# Patient Record
Sex: MALE | URBAN - METROPOLITAN AREA
[De-identification: names, ages, dates, MRNs, and addresses within clinical notes are randomized per-mention and may not be internally consistent; named-entity substitution may affect disease eponyms.]

---

## 2022-09-21 ENCOUNTER — TRANSCRIBE ORDER (OUTPATIENT)
Dept: SCHEDULING | Age: 67
End: 2022-09-21

## 2022-09-21 DIAGNOSIS — M47.819 SPONDYLOSIS WITHOUT MYELOPATHY: Primary | ICD-10-CM

## 2022-09-21 DIAGNOSIS — M51.36 DEGENERATION, INTERVERTEBRAL DISC, LUMBAR: ICD-10-CM

## 2023-05-28 ENCOUNTER — HOSPITAL ENCOUNTER (EMERGENCY)
Facility: HOSPITAL | Age: 68
Discharge: HOME | End: 2023-05-28
Attending: FAMILY MEDICINE
Payer: MEDICAID

## 2023-05-28 ENCOUNTER — APPOINTMENT (OUTPATIENT)
Facility: HOSPITAL | Age: 68
End: 2023-05-28
Payer: MEDICAID

## 2023-05-28 VITALS
DIASTOLIC BLOOD PRESSURE: 79 MMHG | SYSTOLIC BLOOD PRESSURE: 121 MMHG | RESPIRATION RATE: 22 BRPM | BODY MASS INDEX: 33.77 KG/M2 | WEIGHT: 228 LBS | OXYGEN SATURATION: 100 % | TEMPERATURE: 98.6 F | HEIGHT: 69 IN | HEART RATE: 70 BPM

## 2023-05-28 DIAGNOSIS — J45.21 MILD INTERMITTENT ASTHMA WITH EXACERBATION: Primary | ICD-10-CM

## 2023-05-28 PROCEDURE — 99283 EMERGENCY DEPT VISIT LOW MDM: CPT

## 2023-05-28 PROCEDURE — 6370000000 HC RX 637 (ALT 250 FOR IP): Performed by: FAMILY MEDICINE

## 2023-05-28 PROCEDURE — 71046 X-RAY EXAM CHEST 2 VIEWS: CPT

## 2023-05-28 RX ORDER — IPRATROPIUM BROMIDE AND ALBUTEROL SULFATE 2.5; .5 MG/3ML; MG/3ML
1 SOLUTION RESPIRATORY (INHALATION)
Status: COMPLETED | OUTPATIENT
Start: 2023-05-28 | End: 2023-05-28

## 2023-05-28 RX ORDER — ALBUTEROL SULFATE 90 UG/1
2 AEROSOL, METERED RESPIRATORY (INHALATION) EVERY 4 HOURS PRN
Qty: 18 G | Refills: 0 | Status: SHIPPED | OUTPATIENT
Start: 2023-05-28 | End: 2023-06-04

## 2023-05-28 RX ADMIN — IPRATROPIUM BROMIDE AND ALBUTEROL SULFATE 1 DOSE: 2.5; .5 SOLUTION RESPIRATORY (INHALATION) at 08:21

## 2024-12-13 ENCOUNTER — HOSPITAL ENCOUNTER (EMERGENCY)
Facility: HOSPITAL | Age: 69
Discharge: HOME | End: 2024-12-13
Payer: MEDICARE

## 2024-12-13 VITALS
RESPIRATION RATE: 19 BRPM | BODY MASS INDEX: 38.62 KG/M2 | OXYGEN SATURATION: 98 % | SYSTOLIC BLOOD PRESSURE: 171 MMHG | HEART RATE: 65 BPM | WEIGHT: 218 LBS | HEIGHT: 63 IN | DIASTOLIC BLOOD PRESSURE: 83 MMHG | TEMPERATURE: 98.5 F

## 2024-12-13 DIAGNOSIS — I10 HYPERTENSION, UNSPECIFIED TYPE: Primary | ICD-10-CM

## 2024-12-13 PROCEDURE — 99282 EMERGENCY DEPT VISIT SF MDM: CPT

## 2024-12-13 ASSESSMENT — PAIN SCALES - GENERAL: PAINLEVEL_OUTOF10: 0

## 2024-12-13 ASSESSMENT — PAIN - FUNCTIONAL ASSESSMENT: PAIN_FUNCTIONAL_ASSESSMENT: 0-10

## 2024-12-13 NOTE — DISCHARGE INSTRUCTIONS
During yourvisit today, it was found that your home blood pressure monitor was approximately 20 points on systolic and diastolic high compared to our readings in our emergency department today.  This may be causing confusion between your primary care and the monitoring service as they are seeing a much higher blood pressure than what is actually your blood pressure.  My recommendation is that you contact your primary care office and inquire about getting a new blood pressure monitor so that we can remedy the situation.

## 2024-12-13 NOTE — ED PROVIDER NOTES
[facility] EMERGENCY DEPT  EMERGENCY DEPARTMENT HISTORY AND PHYSICAL EXAM      Date: 12/13/2024  Patient Name: Santos Laughlin  MRN: [id number]  YOB: 1955  Date of evaluation: 12/13/2024  Provider: Helio Haider PA-C   Note Started: 1:26 PM EST 12/13/24    HISTORY OF PRESENT ILLNESS     Chief Complaint   Patient presents with    Hypertension       History Provided By: Patient    HPI: Santos Laughlin is a 69 y.o. male past medical history of type II DM, hypertension, anemia, chronic back pain presents to the ED for elevated blood pressure.  Patient states that he has a monitoring service that monitors his vital signs daily.  He has been called several times in the last couple days and told to go to the emergency room because he has had elevated blood pressure.  He states he recently saw his primary care 1 week ago for his blood pressure, and no blood pressure medication adjustments were made at that point.  Patient is a little bit frustrated with this current development.  He states he takes all medications as prescribed, his main complaint about his medications is that he does have some erectile dysfunction.  Denies any chest pain, shortness of breath, SARABIA, nausea, vomiting, abdominal pain.    PAST MEDICAL HISTORY   Past Medical History:  No past medical history on file.    Past Surgical History:  No past surgical history on file.    Family History:  No family history on file.    Social History:       Allergies:  Allergies   Allergen Reactions    Codeine Anxiety    Pcn [Penicillins] Anxiety       PCP: Nitish Dela Cruz DO    Current Meds:   No current facility-administered medications for this encounter.     Current Outpatient Medications   Medication Sig Dispense Refill    albuterol sulfate HFA (PROVENTIL;VENTOLIN;PROAIR) 108 (90 Base) MCG/ACT inhaler Inhale 2 puffs into the lungs every 4 hours as needed for Wheezing 18 g 0       Social Determinants of Health:   Social Determinants of Health     Tobacco Use:  proofreading.)     Helio Haider PA-C  12/13/24 9975

## 2024-12-13 NOTE — ED TRIAGE NOTES
Pt reports elevated BP per company that monitors his vitals. Pt states BP has been elevated for the past year but that insisted that he come. Pt endorses headache intermittently x 2 days

## 2025-03-04 ENCOUNTER — APPOINTMENT (OUTPATIENT)
Facility: HOSPITAL | Age: 70
End: 2025-03-04
Payer: MEDICARE

## 2025-03-04 ENCOUNTER — HOSPITAL ENCOUNTER (EMERGENCY)
Facility: HOSPITAL | Age: 70
Discharge: HOME | End: 2025-03-04
Attending: EMERGENCY MEDICINE
Payer: MEDICARE

## 2025-03-04 VITALS
DIASTOLIC BLOOD PRESSURE: 108 MMHG | BODY MASS INDEX: 30.51 KG/M2 | HEART RATE: 99 BPM | WEIGHT: 206 LBS | RESPIRATION RATE: 16 BRPM | HEIGHT: 69 IN | OXYGEN SATURATION: 100 % | SYSTOLIC BLOOD PRESSURE: 177 MMHG | TEMPERATURE: 98.3 F

## 2025-03-04 DIAGNOSIS — S20.211A CONTUSION OF RIB ON RIGHT SIDE, INITIAL ENCOUNTER: ICD-10-CM

## 2025-03-04 DIAGNOSIS — I10 UNCONTROLLED HYPERTENSION: Primary | ICD-10-CM

## 2025-03-04 LAB
EKG ATRIAL RATE: 106 BPM
EKG DIAGNOSIS: NORMAL
EKG P AXIS: 31 DEGREES
EKG P-R INTERVAL: 172 MS
EKG Q-T INTERVAL: 354 MS
EKG QRS DURATION: 80 MS
EKG QTC CALCULATION (BAZETT): 470 MS
EKG R AXIS: -41 DEGREES
EKG T AXIS: 55 DEGREES
EKG VENTRICULAR RATE: 106 BPM

## 2025-03-04 PROCEDURE — 6370000000 HC RX 637 (ALT 250 FOR IP): Performed by: EMERGENCY MEDICINE

## 2025-03-04 PROCEDURE — 99284 EMERGENCY DEPT VISIT MOD MDM: CPT

## 2025-03-04 PROCEDURE — 93005 ELECTROCARDIOGRAM TRACING: CPT | Performed by: EMERGENCY MEDICINE

## 2025-03-04 PROCEDURE — 71101 X-RAY EXAM UNILAT RIBS/CHEST: CPT

## 2025-03-04 RX ORDER — AMLODIPINE BESYLATE 5 MG/1
5 TABLET ORAL DAILY
COMMUNITY
Start: 2025-02-18 | End: 2025-03-04

## 2025-03-04 RX ORDER — INSULIN LISPRO 100 [IU]/ML
70 INJECTION, SOLUTION INTRAVENOUS; SUBCUTANEOUS
COMMUNITY
Start: 2025-02-21

## 2025-03-04 RX ORDER — NAPROXEN 500 MG/1
500 TABLET ORAL
Status: COMPLETED | OUTPATIENT
Start: 2025-03-04 | End: 2025-03-04

## 2025-03-04 RX ORDER — LIDOCAINE 4 G/G
1 PATCH TOPICAL DAILY
Qty: 20 EACH | Refills: 0 | Status: SHIPPED | OUTPATIENT
Start: 2025-03-04 | End: 2025-03-24

## 2025-03-04 RX ORDER — CLONIDINE HYDROCHLORIDE 0.1 MG/1
0.1 TABLET ORAL
Status: COMPLETED | OUTPATIENT
Start: 2025-03-04 | End: 2025-03-04

## 2025-03-04 RX ORDER — NAPROXEN SODIUM 550 MG/1
550 TABLET ORAL 2 TIMES DAILY WITH MEALS
Qty: 30 TABLET | Refills: 0 | Status: SHIPPED | OUTPATIENT
Start: 2025-03-04

## 2025-03-04 RX ORDER — OLMESARTAN MEDOXOMIL 20 MG/1
20 TABLET ORAL DAILY
COMMUNITY
Start: 2025-02-03

## 2025-03-04 RX ORDER — AMLODIPINE BESYLATE 5 MG/1
10 TABLET ORAL
Status: COMPLETED | OUTPATIENT
Start: 2025-03-04 | End: 2025-03-04

## 2025-03-04 RX ORDER — METHOCARBAMOL 500 MG/1
1000 TABLET, FILM COATED ORAL
Status: COMPLETED | OUTPATIENT
Start: 2025-03-04 | End: 2025-03-04

## 2025-03-04 RX ORDER — LIDOCAINE 4 G/G
1 PATCH TOPICAL
Status: DISCONTINUED | OUTPATIENT
Start: 2025-03-04 | End: 2025-03-04 | Stop reason: HOSPADM

## 2025-03-04 RX ORDER — INSULIN GLARGINE 100 [IU]/ML
38 INJECTION, SOLUTION SUBCUTANEOUS
COMMUNITY
Start: 2025-02-21

## 2025-03-04 RX ORDER — METHOCARBAMOL 750 MG/1
750 TABLET, FILM COATED ORAL 4 TIMES DAILY
Qty: 28 TABLET | Refills: 0 | Status: SHIPPED | OUTPATIENT
Start: 2025-03-04 | End: 2025-03-11

## 2025-03-04 RX ORDER — PREGABALIN 75 MG/1
75 CAPSULE ORAL 2 TIMES DAILY
COMMUNITY

## 2025-03-04 RX ORDER — PANCRELIPASE 24000; 76000; 120000 [USP'U]/1; [USP'U]/1; [USP'U]/1
1 CAPSULE, DELAYED RELEASE PELLETS ORAL 3 TIMES DAILY
COMMUNITY

## 2025-03-04 RX ORDER — CLONIDINE HYDROCHLORIDE 0.1 MG/1
0.2 TABLET ORAL
Status: DISCONTINUED | OUTPATIENT
Start: 2025-03-04 | End: 2025-03-04

## 2025-03-04 RX ORDER — AMLODIPINE BESYLATE 5 MG/1
10 TABLET ORAL DAILY
Qty: 30 TABLET | Refills: 0 | Status: SHIPPED | OUTPATIENT
Start: 2025-03-04

## 2025-03-04 RX ADMIN — METHOCARBAMOL 1000 MG: 500 TABLET ORAL at 15:00

## 2025-03-04 RX ADMIN — CLONIDINE HYDROCHLORIDE 0.1 MG: 0.1 TABLET ORAL at 16:47

## 2025-03-04 RX ADMIN — NAPROXEN 500 MG: 500 TABLET ORAL at 15:00

## 2025-03-04 RX ADMIN — AMLODIPINE BESYLATE 10 MG: 5 TABLET ORAL at 15:00

## 2025-03-04 ASSESSMENT — LIFESTYLE VARIABLES
HOW MANY STANDARD DRINKS CONTAINING ALCOHOL DO YOU HAVE ON A TYPICAL DAY: 1 OR 2
HOW OFTEN DO YOU HAVE A DRINK CONTAINING ALCOHOL: 2-3 TIMES A WEEK

## 2025-03-04 ASSESSMENT — PAIN - FUNCTIONAL ASSESSMENT
PAIN_FUNCTIONAL_ASSESSMENT: 0-10
PAIN_FUNCTIONAL_ASSESSMENT: 0-10

## 2025-03-04 ASSESSMENT — PAIN DESCRIPTION - ORIENTATION: ORIENTATION: RIGHT

## 2025-03-04 ASSESSMENT — PAIN DESCRIPTION - LOCATION: LOCATION: RIB CAGE

## 2025-03-04 ASSESSMENT — PAIN SCALES - GENERAL
PAINLEVEL_OUTOF10: 8
PAINLEVEL_OUTOF10: 4

## 2025-03-04 NOTE — ED PROVIDER NOTES
[facility] EMERGENCY DEPT  EMERGENCY DEPARTMENT HISTORY AND PHYSICAL EXAM      Date: 3/4/2025  Patient Name: Santos Laughlin  MRN: [id number]  Birthdate 1955  Date of evaluation: 3/4/2025  Provider: Wilmer Cerrato MD   Note Started: 2:54 PM EST 3/4/25    HISTORY OF PRESENT ILLNESS     Chief Complaint   Patient presents with    Hypertension    Rib Pain (injury)       History Provided By: Patient    HPI: Santos Laughlin is a 70 y.o. male with a history of diabetes and hypertension presenting for elevated blood pressure as well as right rib pain.  Patient states that on Friday, several days ago he had a ground-level fall into his bathtub and injured his right rib pain.  States that it hurts when he is coughing or sneezing.  Denies any increased shortness of breath or loss of consciousness.  States that it hurts every so often.  Then today he had a nurse monitoring his blood pressure at home and when checked his blood pressure as it was over 200s so sent here.  Denies any headache, chest pain.  He does state that besides pain he also did eat cherry tomatoes with salt and pepper on it yesterday.    PAST MEDICAL HISTORY   Past Medical History:  Past Medical History:   Diagnosis Date    Diabetes mellitus (HCC)     Hypertension        Past Surgical History:  History reviewed. No pertinent surgical history.    Family History:  History reviewed. No pertinent family history.    Social History:  Social History     Tobacco Use    Smoking status: Never    Smokeless tobacco: Never   Substance Use Topics    Alcohol use: Yes    Drug use: Never       Allergies:  Allergies   Allergen Reactions    Codeine Anxiety    Pcn [Penicillins] Anxiety       PCP: Nitish Dela Cruz DO    Current Meds:   Current Facility-Administered Medications   Medication Dose Route Frequency Provider Last Rate Last Admin    lidocaine 4 % external patch 1 patch  1 patch TransDERmal NOW Wilmer Cerrato MD   1 patch at 03/04/25 2051     Current Outpatient Medications

## 2025-03-04 NOTE — ED TRIAGE NOTES
Pt referred to ED for elevated BP; pt states he monitors his BP at home which then gets sent to his MD remotely    Also c/o right rib pain s/p GLF into a bathtub on Friday night

## 2025-03-04 NOTE — DISCHARGE INSTRUCTIONS
available 24 hours a day.     If a prescription has been provided, please fill it as soon as possible to prevent a delay in treatment. If you have any questions or reservations about taking the medication due to side effects or interactions with other medications, please call your primary care provider or contact us directly.  Again, THANK YOU for choosing us to care for YOU!

## 2025-03-28 ENCOUNTER — APPOINTMENT (OUTPATIENT)
Facility: HOSPITAL | Age: 70
DRG: 871 | End: 2025-03-28
Payer: MEDICARE

## 2025-03-28 ENCOUNTER — APPOINTMENT (OUTPATIENT)
Facility: HOSPITAL | Age: 70
DRG: 871 | End: 2025-03-28
Attending: STUDENT IN AN ORGANIZED HEALTH CARE EDUCATION/TRAINING PROGRAM
Payer: MEDICARE

## 2025-03-28 ENCOUNTER — HOSPITAL ENCOUNTER (INPATIENT)
Facility: HOSPITAL | Age: 70
LOS: 7 days | Discharge: NURSING, REHAB OR LONG-TERM CARE | DRG: 871 | End: 2025-04-04
Attending: STUDENT IN AN ORGANIZED HEALTH CARE EDUCATION/TRAINING PROGRAM
Payer: MEDICARE

## 2025-03-28 DIAGNOSIS — K86.1 ACUTE ON CHRONIC PANCREATITIS (HCC): ICD-10-CM

## 2025-03-28 DIAGNOSIS — K85.90 ACUTE ON CHRONIC PANCREATITIS (HCC): ICD-10-CM

## 2025-03-28 DIAGNOSIS — A41.9 SEPSIS, DUE TO UNSPECIFIED ORGANISM, UNSPECIFIED WHETHER ACUTE ORGAN DYSFUNCTION PRESENT (HCC): Primary | ICD-10-CM

## 2025-03-28 DIAGNOSIS — R16.0 LIVER MASS: ICD-10-CM

## 2025-03-28 DIAGNOSIS — R06.02 SHORTNESS OF BREATH: ICD-10-CM

## 2025-03-28 PROBLEM — R65.21 SEPTIC SHOCK (HCC): Status: ACTIVE | Noted: 2025-03-28

## 2025-03-28 PROBLEM — M54.9 CHRONIC BACK PAIN: Status: ACTIVE | Noted: 2025-03-28

## 2025-03-28 PROBLEM — D64.9 ANEMIA: Status: ACTIVE | Noted: 2025-03-28

## 2025-03-28 PROBLEM — Z79.891 CHRONIC PRESCRIPTION OPIATE USE: Status: ACTIVE | Noted: 2025-03-28

## 2025-03-28 PROBLEM — K76.9 LIVER LESION: Status: ACTIVE | Noted: 2025-03-28

## 2025-03-28 PROBLEM — R65.20 SEVERE SEPSIS: Status: ACTIVE | Noted: 2025-03-28

## 2025-03-28 PROBLEM — R18.8 ASCITES: Status: ACTIVE | Noted: 2025-03-28

## 2025-03-28 PROBLEM — G89.29 CHRONIC BACK PAIN: Status: ACTIVE | Noted: 2025-03-28

## 2025-03-28 LAB
ALBUMIN SERPL-MCNC: 1.4 G/DL (ref 3.5–5)
ALBUMIN/GLOB SERPL: 0.3 (ref 1.1–2.2)
ALP SERPL-CCNC: 354 U/L (ref 45–117)
ALT SERPL-CCNC: 10 U/L (ref 12–78)
ANION GAP SERPL CALC-SCNC: 8 MMOL/L (ref 2–12)
APPEARANCE UR: ABNORMAL
AST SERPL W P-5'-P-CCNC: 17 U/L (ref 15–37)
BACTERIA URNS QL MICRO: NEGATIVE /HPF
BASOPHILS # BLD: 0 K/UL (ref 0–0.1)
BASOPHILS NFR BLD: 0 % (ref 0–1)
BILIRUB SERPL-MCNC: 1.1 MG/DL (ref 0.2–1)
BILIRUB UR QL: NEGATIVE
BUN SERPL-MCNC: 33 MG/DL (ref 6–20)
BUN/CREAT SERPL: 17 (ref 12–20)
CA-I BLD-MCNC: 8.2 MG/DL (ref 8.5–10.1)
CHLORIDE SERPL-SCNC: 101 MMOL/L (ref 97–108)
CO2 SERPL-SCNC: 23 MMOL/L (ref 21–32)
COLLECT DATE STL: NORMAL
COLOR UR: ABNORMAL
CREAT SERPL-MCNC: 1.9 MG/DL (ref 0.7–1.3)
DIFFERENTIAL METHOD BLD: ABNORMAL
EKG ATRIAL RATE: 98 BPM
EKG DIAGNOSIS: NORMAL
EKG P AXIS: 37 DEGREES
EKG P-R INTERVAL: 158 MS
EKG Q-T INTERVAL: 374 MS
EKG QRS DURATION: 82 MS
EKG QTC CALCULATION (BAZETT): 477 MS
EKG R AXIS: -29 DEGREES
EKG T AXIS: 77 DEGREES
EKG VENTRICULAR RATE: 98 BPM
EOSINOPHIL # BLD: 0 K/UL (ref 0–0.4)
EOSINOPHIL NFR BLD: 0 % (ref 0–7)
EPITH CASTS URNS QL MICRO: ABNORMAL /LPF
ERYTHROCYTE [DISTWIDTH] IN BLOOD BY AUTOMATED COUNT: 16.6 % (ref 11.5–14.5)
ETHANOL SERPL-MCNC: <10 MG/DL (ref 0–0.08)
GLOBULIN SER CALC-MCNC: 5.2 G/DL (ref 2–4)
GLUCOSE BLD STRIP.AUTO-MCNC: 149 MG/DL (ref 65–100)
GLUCOSE BLD STRIP.AUTO-MCNC: 204 MG/DL (ref 65–100)
GLUCOSE SERPL-MCNC: 143 MG/DL (ref 65–100)
GLUCOSE UR STRIP.AUTO-MCNC: NEGATIVE MG/DL
HCT VFR BLD AUTO: 23 % (ref 36.6–50.3)
HEMOCCULT SP1 STL QL: NEGATIVE
HGB BLD-MCNC: 7.5 G/DL (ref 12.1–17)
HGB UR QL STRIP: NEGATIVE
HYALINE CASTS URNS QL MICRO: >20 /LPF (ref 0–5)
IMM GRANULOCYTES # BLD AUTO: 0 K/UL
IMM GRANULOCYTES NFR BLD AUTO: 0 %
KETONES UR QL STRIP.AUTO: NEGATIVE MG/DL
LACTATE BLD-SCNC: 1.33 MMOL/L (ref 0.4–2)
LACTATE BLD-SCNC: 2.07 MMOL/L (ref 0.4–2)
LEUKOCYTE ESTERASE UR QL STRIP.AUTO: ABNORMAL
LIPASE SERPL-CCNC: 6 U/L (ref 13–75)
LYMPHOCYTES # BLD: 0.26 K/UL (ref 0.8–3.5)
LYMPHOCYTES NFR BLD: 1 % (ref 12–49)
MCH RBC QN AUTO: 29.2 PG (ref 26–34)
MCHC RBC AUTO-ENTMCNC: 32.6 G/DL (ref 30–36.5)
MCV RBC AUTO: 89.5 FL (ref 80–99)
MONOCYTES # BLD: 1.04 K/UL (ref 0–1)
MONOCYTES NFR BLD: 4 % (ref 5–13)
MRSA DNA SPEC QL NAA+PROBE: NOT DETECTED
MUCOUS THREADS URNS QL MICRO: ABNORMAL /LPF
NEUTS BAND NFR BLD MANUAL: 7 % (ref 0–6)
NEUTS SEG # BLD: 24.7 K/UL (ref 1.8–8)
NEUTS SEG NFR BLD: 88 % (ref 32–75)
NITRITE UR QL STRIP.AUTO: NEGATIVE
NRBC # BLD: 0 K/UL (ref 0–0.01)
NRBC BLD-RTO: 0 PER 100 WBC
PERFORMED BY:: ABNORMAL
PERFORMED BY:: NORMAL
PH UR STRIP: 5 (ref 5–8)
PLATELET # BLD AUTO: 319 K/UL (ref 150–400)
PMV BLD AUTO: 10.7 FL (ref 8.9–12.9)
POTASSIUM SERPL-SCNC: 4.4 MMOL/L (ref 3.5–5.1)
PROCALCITONIN SERPL-MCNC: 176.95 NG/ML
PROT SERPL-MCNC: 6.6 G/DL (ref 6.4–8.2)
PROT UR STRIP-MCNC: 30 MG/DL
RBC # BLD AUTO: 2.57 M/UL (ref 4.1–5.7)
RBC #/AREA URNS HPF: ABNORMAL /HPF (ref 0–5)
RBC MORPH BLD: ABNORMAL
RETICS # AUTO: 0.06 M/UL (ref 0.03–0.1)
RETICS/RBC NFR AUTO: 2.5 % (ref 0.7–2.1)
SODIUM SERPL-SCNC: 132 MMOL/L (ref 136–145)
SP GR UR REFRACTOMETRY: 1.03 (ref 1–1.03)
TROPONIN I SERPL HS-MCNC: 16 NG/L (ref 0–76)
URINE CULTURE IF INDICATED: ABNORMAL
UROBILINOGEN UR QL STRIP.AUTO: 0.1 EU/DL (ref 0.1–1)
WBC # BLD AUTO: 26 K/UL (ref 4.1–11.1)
WBC URNS QL MICRO: ABNORMAL /HPF (ref 0–4)

## 2025-03-28 PROCEDURE — 81001 URINALYSIS AUTO W/SCOPE: CPT

## 2025-03-28 PROCEDURE — 80053 COMPREHEN METABOLIC PANEL: CPT

## 2025-03-28 PROCEDURE — 82962 GLUCOSE BLOOD TEST: CPT

## 2025-03-28 PROCEDURE — 6360000002 HC RX W HCPCS: Performed by: STUDENT IN AN ORGANIZED HEALTH CARE EDUCATION/TRAINING PROGRAM

## 2025-03-28 PROCEDURE — 84145 PROCALCITONIN (PCT): CPT

## 2025-03-28 PROCEDURE — P9045 ALBUMIN (HUMAN), 5%, 250 ML: HCPCS | Performed by: NURSE PRACTITIONER

## 2025-03-28 PROCEDURE — 84484 ASSAY OF TROPONIN QUANT: CPT

## 2025-03-28 PROCEDURE — 87086 URINE CULTURE/COLONY COUNT: CPT

## 2025-03-28 PROCEDURE — 2580000003 HC RX 258: Performed by: STUDENT IN AN ORGANIZED HEALTH CARE EDUCATION/TRAINING PROGRAM

## 2025-03-28 PROCEDURE — 83605 ASSAY OF LACTIC ACID: CPT

## 2025-03-28 PROCEDURE — 96374 THER/PROPH/DIAG INJ IV PUSH: CPT

## 2025-03-28 PROCEDURE — 2500000003 HC RX 250 WO HCPCS: Performed by: INTERNAL MEDICINE

## 2025-03-28 PROCEDURE — 6360000002 HC RX W HCPCS: Performed by: NURSE PRACTITIONER

## 2025-03-28 PROCEDURE — 76705 ECHO EXAM OF ABDOMEN: CPT

## 2025-03-28 PROCEDURE — 99285 EMERGENCY DEPT VISIT HI MDM: CPT

## 2025-03-28 PROCEDURE — 51798 US URINE CAPACITY MEASURE: CPT

## 2025-03-28 PROCEDURE — 82077 ASSAY SPEC XCP UR&BREATH IA: CPT

## 2025-03-28 PROCEDURE — 6360000002 HC RX W HCPCS: Performed by: INTERNAL MEDICINE

## 2025-03-28 PROCEDURE — 83540 ASSAY OF IRON: CPT

## 2025-03-28 PROCEDURE — 71045 X-RAY EXAM CHEST 1 VIEW: CPT

## 2025-03-28 PROCEDURE — 2580000003 HC RX 258: Performed by: INTERNAL MEDICINE

## 2025-03-28 PROCEDURE — 83690 ASSAY OF LIPASE: CPT

## 2025-03-28 PROCEDURE — 6360000004 HC RX CONTRAST MEDICATION: Performed by: STUDENT IN AN ORGANIZED HEALTH CARE EDUCATION/TRAINING PROGRAM

## 2025-03-28 PROCEDURE — 99223 1ST HOSP IP/OBS HIGH 75: CPT | Performed by: INTERNAL MEDICINE

## 2025-03-28 PROCEDURE — 87040 BLOOD CULTURE FOR BACTERIA: CPT

## 2025-03-28 PROCEDURE — 87641 MR-STAPH DNA AMP PROBE: CPT

## 2025-03-28 PROCEDURE — 6370000000 HC RX 637 (ALT 250 FOR IP): Performed by: INTERNAL MEDICINE

## 2025-03-28 PROCEDURE — 2000000000 HC ICU R&B

## 2025-03-28 PROCEDURE — 82272 OCCULT BLD FECES 1-3 TESTS: CPT

## 2025-03-28 PROCEDURE — 85025 COMPLETE CBC W/AUTO DIFF WBC: CPT

## 2025-03-28 PROCEDURE — 2500000003 HC RX 250 WO HCPCS

## 2025-03-28 PROCEDURE — 96365 THER/PROPH/DIAG IV INF INIT: CPT

## 2025-03-28 PROCEDURE — 82607 VITAMIN B-12: CPT

## 2025-03-28 PROCEDURE — 82746 ASSAY OF FOLIC ACID SERUM: CPT

## 2025-03-28 PROCEDURE — 2500000003 HC RX 250 WO HCPCS: Performed by: STUDENT IN AN ORGANIZED HEALTH CARE EDUCATION/TRAINING PROGRAM

## 2025-03-28 PROCEDURE — 96361 HYDRATE IV INFUSION ADD-ON: CPT

## 2025-03-28 PROCEDURE — 85045 AUTOMATED RETICULOCYTE COUNT: CPT

## 2025-03-28 PROCEDURE — 74177 CT ABD & PELVIS W/CONTRAST: CPT

## 2025-03-28 PROCEDURE — 93005 ELECTROCARDIOGRAM TRACING: CPT | Performed by: STUDENT IN AN ORGANIZED HEALTH CARE EDUCATION/TRAINING PROGRAM

## 2025-03-28 PROCEDURE — 96375 TX/PRO/DX INJ NEW DRUG ADDON: CPT

## 2025-03-28 RX ORDER — SODIUM CHLORIDE 0.9 % (FLUSH) 0.9 %
5-40 SYRINGE (ML) INJECTION EVERY 12 HOURS SCHEDULED
Status: DISCONTINUED | OUTPATIENT
Start: 2025-03-28 | End: 2025-03-28 | Stop reason: SDUPTHER

## 2025-03-28 RX ORDER — POTASSIUM CHLORIDE 7.45 MG/ML
10 INJECTION INTRAVENOUS PRN
Status: DISCONTINUED | OUTPATIENT
Start: 2025-03-28 | End: 2025-04-04 | Stop reason: HOSPADM

## 2025-03-28 RX ORDER — SODIUM CHLORIDE 9 MG/ML
INJECTION, SOLUTION INTRAVENOUS PRN
Status: DISCONTINUED | OUTPATIENT
Start: 2025-03-28 | End: 2025-04-04 | Stop reason: HOSPADM

## 2025-03-28 RX ORDER — POLYETHYLENE GLYCOL 3350 17 G/17G
17 POWDER, FOR SOLUTION ORAL DAILY PRN
Status: DISCONTINUED | OUTPATIENT
Start: 2025-03-28 | End: 2025-04-04 | Stop reason: HOSPADM

## 2025-03-28 RX ORDER — 0.9 % SODIUM CHLORIDE 0.9 %
1000 INTRAVENOUS SOLUTION INTRAVENOUS ONCE
Status: COMPLETED | OUTPATIENT
Start: 2025-03-28 | End: 2025-03-28

## 2025-03-28 RX ORDER — NOREPINEPHRINE BITARTRATE 0.06 MG/ML
INJECTION, SOLUTION INTRAVENOUS
Status: COMPLETED
Start: 2025-03-28 | End: 2025-03-28

## 2025-03-28 RX ORDER — METRONIDAZOLE 500 MG/100ML
500 INJECTION, SOLUTION INTRAVENOUS ONCE
Status: COMPLETED | OUTPATIENT
Start: 2025-03-28 | End: 2025-03-28

## 2025-03-28 RX ORDER — MAGNESIUM SULFATE IN WATER 40 MG/ML
2000 INJECTION, SOLUTION INTRAVENOUS PRN
Status: DISCONTINUED | OUTPATIENT
Start: 2025-03-28 | End: 2025-04-04 | Stop reason: HOSPADM

## 2025-03-28 RX ORDER — INSULIN LISPRO 100 [IU]/ML
0-4 INJECTION, SOLUTION INTRAVENOUS; SUBCUTANEOUS
Status: DISCONTINUED | OUTPATIENT
Start: 2025-03-28 | End: 2025-03-29

## 2025-03-28 RX ORDER — IOPAMIDOL 755 MG/ML
100 INJECTION, SOLUTION INTRAVASCULAR
Status: COMPLETED | OUTPATIENT
Start: 2025-03-28 | End: 2025-03-28

## 2025-03-28 RX ORDER — ONDANSETRON 4 MG/1
4 TABLET, ORALLY DISINTEGRATING ORAL EVERY 8 HOURS PRN
Status: DISCONTINUED | OUTPATIENT
Start: 2025-03-28 | End: 2025-04-04 | Stop reason: HOSPADM

## 2025-03-28 RX ORDER — HYDROMORPHONE HYDROCHLORIDE 1 MG/ML
0.5 INJECTION, SOLUTION INTRAMUSCULAR; INTRAVENOUS; SUBCUTANEOUS EVERY 4 HOURS PRN
Status: ACTIVE | OUTPATIENT
Start: 2025-03-28 | End: 2025-03-30

## 2025-03-28 RX ORDER — KETOROLAC TROMETHAMINE 15 MG/ML
15 INJECTION, SOLUTION INTRAMUSCULAR; INTRAVENOUS
Status: COMPLETED | OUTPATIENT
Start: 2025-03-28 | End: 2025-03-28

## 2025-03-28 RX ORDER — SODIUM CHLORIDE 0.9 % (FLUSH) 0.9 %
5-40 SYRINGE (ML) INJECTION EVERY 12 HOURS SCHEDULED
Status: DISCONTINUED | OUTPATIENT
Start: 2025-03-28 | End: 2025-04-04 | Stop reason: HOSPADM

## 2025-03-28 RX ORDER — SODIUM CHLORIDE, SODIUM LACTATE, POTASSIUM CHLORIDE, AND CALCIUM CHLORIDE .6; .31; .03; .02 G/100ML; G/100ML; G/100ML; G/100ML
500 INJECTION, SOLUTION INTRAVENOUS ONCE
Status: DISCONTINUED | OUTPATIENT
Start: 2025-03-28 | End: 2025-04-01

## 2025-03-28 RX ORDER — ACETAMINOPHEN 650 MG/1
650 SUPPOSITORY RECTAL EVERY 6 HOURS PRN
Status: DISCONTINUED | OUTPATIENT
Start: 2025-03-28 | End: 2025-03-28 | Stop reason: SDUPTHER

## 2025-03-28 RX ORDER — SODIUM CHLORIDE 9 MG/ML
INJECTION, SOLUTION INTRAVENOUS CONTINUOUS
Status: DISCONTINUED | OUTPATIENT
Start: 2025-03-28 | End: 2025-03-31

## 2025-03-28 RX ORDER — POTASSIUM CHLORIDE 1500 MG/1
40 TABLET, EXTENDED RELEASE ORAL PRN
Status: DISCONTINUED | OUTPATIENT
Start: 2025-03-28 | End: 2025-04-04 | Stop reason: HOSPADM

## 2025-03-28 RX ORDER — ACETAMINOPHEN 325 MG/1
650 TABLET ORAL EVERY 6 HOURS PRN
Status: DISCONTINUED | OUTPATIENT
Start: 2025-03-28 | End: 2025-04-04 | Stop reason: HOSPADM

## 2025-03-28 RX ORDER — MORPHINE SULFATE 2 MG/ML
2 INJECTION, SOLUTION INTRAMUSCULAR; INTRAVENOUS EVERY 4 HOURS PRN
Refills: 0 | Status: DISCONTINUED | OUTPATIENT
Start: 2025-03-28 | End: 2025-04-01

## 2025-03-28 RX ORDER — SODIUM CHLORIDE, SODIUM LACTATE, POTASSIUM CHLORIDE, CALCIUM CHLORIDE 600; 310; 30; 20 MG/100ML; MG/100ML; MG/100ML; MG/100ML
INJECTION, SOLUTION INTRAVENOUS CONTINUOUS
Status: DISCONTINUED | OUTPATIENT
Start: 2025-03-28 | End: 2025-03-28 | Stop reason: ALTCHOICE

## 2025-03-28 RX ORDER — SODIUM CHLORIDE 0.9 % (FLUSH) 0.9 %
5-40 SYRINGE (ML) INJECTION PRN
Status: DISCONTINUED | OUTPATIENT
Start: 2025-03-28 | End: 2025-03-28 | Stop reason: SDUPTHER

## 2025-03-28 RX ORDER — ALBUMIN HUMAN 50 G/1000ML
25 SOLUTION INTRAVENOUS ONCE
Status: COMPLETED | OUTPATIENT
Start: 2025-03-28 | End: 2025-03-28

## 2025-03-28 RX ORDER — ACETAMINOPHEN 325 MG/1
650 TABLET ORAL EVERY 6 HOURS PRN
Status: DISCONTINUED | OUTPATIENT
Start: 2025-03-28 | End: 2025-03-28 | Stop reason: SDUPTHER

## 2025-03-28 RX ORDER — SODIUM CHLORIDE 0.9 % (FLUSH) 0.9 %
5-40 SYRINGE (ML) INJECTION PRN
Status: DISCONTINUED | OUTPATIENT
Start: 2025-03-28 | End: 2025-04-04 | Stop reason: HOSPADM

## 2025-03-28 RX ORDER — ACETAMINOPHEN 650 MG/1
650 SUPPOSITORY RECTAL EVERY 6 HOURS PRN
Status: DISCONTINUED | OUTPATIENT
Start: 2025-03-28 | End: 2025-04-04 | Stop reason: HOSPADM

## 2025-03-28 RX ORDER — SENNOSIDES A AND B 8.6 MG/1
1 TABLET, FILM COATED ORAL DAILY PRN
Status: DISCONTINUED | OUTPATIENT
Start: 2025-03-28 | End: 2025-04-04 | Stop reason: HOSPADM

## 2025-03-28 RX ORDER — MORPHINE SULFATE 4 MG/ML
4 INJECTION, SOLUTION INTRAMUSCULAR; INTRAVENOUS
Status: COMPLETED | OUTPATIENT
Start: 2025-03-28 | End: 2025-03-28

## 2025-03-28 RX ORDER — ENOXAPARIN SODIUM 100 MG/ML
30 INJECTION SUBCUTANEOUS 2 TIMES DAILY
Status: DISCONTINUED | OUTPATIENT
Start: 2025-03-28 | End: 2025-04-04 | Stop reason: HOSPADM

## 2025-03-28 RX ORDER — SODIUM CHLORIDE, SODIUM LACTATE, POTASSIUM CHLORIDE, AND CALCIUM CHLORIDE .6; .31; .03; .02 G/100ML; G/100ML; G/100ML; G/100ML
250 INJECTION, SOLUTION INTRAVENOUS ONCE
Status: DISCONTINUED | OUTPATIENT
Start: 2025-03-28 | End: 2025-04-01

## 2025-03-28 RX ORDER — KETOROLAC TROMETHAMINE 30 MG/ML
30 INJECTION, SOLUTION INTRAMUSCULAR; INTRAVENOUS
Status: DISCONTINUED | OUTPATIENT
Start: 2025-03-28 | End: 2025-03-28

## 2025-03-28 RX ORDER — ONDANSETRON 2 MG/ML
4 INJECTION INTRAMUSCULAR; INTRAVENOUS EVERY 6 HOURS PRN
Status: DISCONTINUED | OUTPATIENT
Start: 2025-03-28 | End: 2025-04-04 | Stop reason: HOSPADM

## 2025-03-28 RX ORDER — HYDROMORPHONE HYDROCHLORIDE 1 MG/ML
1 INJECTION, SOLUTION INTRAMUSCULAR; INTRAVENOUS; SUBCUTANEOUS EVERY 4 HOURS PRN
Status: DISPENSED | OUTPATIENT
Start: 2025-03-28 | End: 2025-03-30

## 2025-03-28 RX ORDER — NOREPINEPHRINE BITARTRATE 0.06 MG/ML
1-100 INJECTION, SOLUTION INTRAVENOUS CONTINUOUS
Status: DISCONTINUED | OUTPATIENT
Start: 2025-03-28 | End: 2025-03-29

## 2025-03-28 RX ADMIN — MORPHINE SULFATE 2 MG: 2 INJECTION, SOLUTION INTRAMUSCULAR; INTRAVENOUS at 22:19

## 2025-03-28 RX ADMIN — SODIUM CHLORIDE: 0.9 INJECTION, SOLUTION INTRAVENOUS at 21:08

## 2025-03-28 RX ADMIN — SODIUM CHLORIDE, POTASSIUM CHLORIDE, SODIUM LACTATE AND CALCIUM CHLORIDE: 600; 310; 30; 20 INJECTION, SOLUTION INTRAVENOUS at 16:18

## 2025-03-28 RX ADMIN — PANCRELIPASE LIPASE, PANCRELIPASE PROTEASE, PANCRELIPASE AMYLASE 5000 UNITS: 5000; 17000; 24000 CAPSULE, DELAYED RELEASE ORAL at 20:05

## 2025-03-28 RX ADMIN — HYDROMORPHONE HYDROCHLORIDE 1 MG: 1 INJECTION, SOLUTION INTRAMUSCULAR; INTRAVENOUS; SUBCUTANEOUS at 16:18

## 2025-03-28 RX ADMIN — ACETAMINOPHEN 650 MG: 325 TABLET ORAL at 19:00

## 2025-03-28 RX ADMIN — MEROPENEM 1000 MG: 1 INJECTION INTRAVENOUS at 20:03

## 2025-03-28 RX ADMIN — NOREPINEPHRINE BITARTRATE 5 MCG/MIN: 0.06 INJECTION, SOLUTION INTRAVENOUS at 18:55

## 2025-03-28 RX ADMIN — SODIUM CHLORIDE 1000 ML: 0.9 INJECTION, SOLUTION INTRAVENOUS at 13:14

## 2025-03-28 RX ADMIN — IOPAMIDOL 100 ML: 755 INJECTION, SOLUTION INTRAVENOUS at 14:02

## 2025-03-28 RX ADMIN — Medication 5 MCG/MIN: at 18:55

## 2025-03-28 RX ADMIN — CEFEPIME 2000 MG: 2 INJECTION, POWDER, FOR SOLUTION INTRAVENOUS at 13:11

## 2025-03-28 RX ADMIN — ENOXAPARIN SODIUM 30 MG: 100 INJECTION SUBCUTANEOUS at 21:24

## 2025-03-28 RX ADMIN — ALBUMIN (HUMAN) 25 G: 12.5 INJECTION, SOLUTION INTRAVENOUS at 21:57

## 2025-03-28 RX ADMIN — HYDROMORPHONE HYDROCHLORIDE 1 MG: 1 INJECTION, SOLUTION INTRAMUSCULAR; INTRAVENOUS; SUBCUTANEOUS at 20:03

## 2025-03-28 RX ADMIN — SODIUM CHLORIDE, PRESERVATIVE FREE 10 ML: 5 INJECTION INTRAVENOUS at 20:09

## 2025-03-28 RX ADMIN — SODIUM CHLORIDE, PRESERVATIVE FREE 10 ML: 5 INJECTION INTRAVENOUS at 20:10

## 2025-03-28 RX ADMIN — INSULIN LISPRO 1 UNITS: 100 INJECTION, SOLUTION INTRAVENOUS; SUBCUTANEOUS at 21:24

## 2025-03-28 RX ADMIN — SODIUM CHLORIDE, POTASSIUM CHLORIDE, SODIUM LACTATE AND CALCIUM CHLORIDE: 600; 310; 30; 20 INJECTION, SOLUTION INTRAVENOUS at 18:22

## 2025-03-28 RX ADMIN — METRONIDAZOLE 500 MG: 500 INJECTION, SOLUTION INTRAVENOUS at 14:46

## 2025-03-28 RX ADMIN — PANCRELIPASE LIPASE, PANCRELIPASE PROTEASE, PANCRELIPASE AMYLASE 20000 UNITS: 20000; 63000; 84000 CAPSULE, DELAYED RELEASE ORAL at 20:05

## 2025-03-28 RX ADMIN — MORPHINE SULFATE 4 MG: 4 INJECTION, SOLUTION INTRAMUSCULAR; INTRAVENOUS at 13:29

## 2025-03-28 RX ADMIN — SODIUM CHLORIDE, SODIUM LACTATE, POTASSIUM CHLORIDE, AND CALCIUM CHLORIDE 500 ML: .6; .31; .03; .02 INJECTION, SOLUTION INTRAVENOUS at 17:35

## 2025-03-28 RX ADMIN — KETOROLAC TROMETHAMINE 15 MG: 15 INJECTION, SOLUTION INTRAMUSCULAR; INTRAVENOUS at 12:23

## 2025-03-28 RX ADMIN — SODIUM CHLORIDE 1000 ML: 0.9 INJECTION, SOLUTION INTRAVENOUS at 12:23

## 2025-03-28 RX ADMIN — SODIUM CHLORIDE, SODIUM LACTATE, POTASSIUM CHLORIDE, AND CALCIUM CHLORIDE 250 ML: .6; .31; .03; .02 INJECTION, SOLUTION INTRAVENOUS at 16:55

## 2025-03-28 ASSESSMENT — PAIN DESCRIPTION - LOCATION
LOCATION: ABDOMEN

## 2025-03-28 ASSESSMENT — PAIN DESCRIPTION - ORIENTATION: ORIENTATION: RIGHT;UPPER

## 2025-03-28 ASSESSMENT — PAIN SCALES - GENERAL
PAINLEVEL_OUTOF10: 4
PAINLEVEL_OUTOF10: 6
PAINLEVEL_OUTOF10: 10
PAINLEVEL_OUTOF10: 2
PAINLEVEL_OUTOF10: 8
PAINLEVEL_OUTOF10: 3
PAINLEVEL_OUTOF10: 7
PAINLEVEL_OUTOF10: 10
PAINLEVEL_OUTOF10: 7
PAINLEVEL_OUTOF10: 8
PAINLEVEL_OUTOF10: 4
PAINLEVEL_OUTOF10: 9

## 2025-03-28 ASSESSMENT — PAIN DESCRIPTION - DESCRIPTORS: DESCRIPTORS: ACHING

## 2025-03-28 ASSESSMENT — LIFESTYLE VARIABLES
HOW OFTEN DO YOU HAVE A DRINK CONTAINING ALCOHOL: NEVER
HOW MANY STANDARD DRINKS CONTAINING ALCOHOL DO YOU HAVE ON A TYPICAL DAY: PATIENT DOES NOT DRINK

## 2025-03-28 NOTE — ED NOTES
Pt requesting to eat, no order entered. MD contacted, MD states that patient can eat.   (0) independent

## 2025-03-28 NOTE — ED TRIAGE NOTES
Abdominal pain, hx of pancreatitis, pain has been going on for a couple of days. Increased nausea.    Was complaining of dizziness for EMS, relieved after receiving 500ML NS bolus.     Currently alert and oriented.

## 2025-03-28 NOTE — ED NOTES
Pt BP low, MAP less than 65. MD notified. Verbal order received to administer 250 mL bolus of LR at this time. Bolus from bag started at this time  1715: Bolus from bag completed at this time.

## 2025-03-28 NOTE — ED PROVIDER NOTES
[facility] EMERGENCY DEPT  EMERGENCY DEPARTMENT HISTORY AND PHYSICAL EXAM      Date of evaluation: 3/28/2025  Patient Name: Santos Laughlin  Birthdate 1955  MRN: [id number]  ED Provider: Rick Hernandez MD   Note Started: 1:29 PM EDT 3/28/25    HISTORY OF PRESENT ILLNESS     Chief Complaint   Patient presents with    Abdominal Pain       History Provided By: Patient,     family member    HPI: Santos Laughlin is a 70 y.o. male with past medical history as reviewed below presents for evaluation of abdominal pain.  States that pain has been present for the last week.  Endorses nausea.  No diarrhea but he states that he has been sticking to mostly liquid meals so stools have been looser than usual.  No recent falls or trauma.  No fevers at home.    PAST MEDICAL HISTORY   Past Medical History:  Past Medical History:   Diagnosis Date    Chronic back pain     Chronic pancreatitis (HCC)     Chronic prescription opiate use 03/28/2025    COPD (chronic obstructive pulmonary disease) (HCC)     Diabetes mellitus (HCC)     Ex-smoker     History of alcohol abuse     Not currently, many years ago    Hypertension     Presence of pancreatic duct stent        Past Surgical History:  History reviewed. No pertinent surgical history.    Family History:  History reviewed. No pertinent family history.    Social History:  Social History     Tobacco Use    Smoking status: Never    Smokeless tobacco: Never   Substance Use Topics    Alcohol use: Yes    Drug use: Never       Allergies:  Allergies   Allergen Reactions    Codeine Anxiety    Pcn [Penicillins] Anxiety       PCP: Nitish Dela Cruz DO    Current Meds:   Current Facility-Administered Medications   Medication Dose Route Frequency Provider Last Rate Last Admin    HYDROmorphone HCl PF (DILAUDID) injection 0.5 mg  0.5 mg IntraVENous Q4H PRN Jose Enrique Tejada MD        Or    HYDROmorphone HCl PF (DILAUDID) injection 1 mg  1 mg IntraVENous Q4H PRN Jose Enrique Tejada MD        insulin

## 2025-03-28 NOTE — CONSULTS
Consult Note            Date:3/28/2025        Patient Name:Santos Laughlin     YOB: 1955     Age:70 y.o.    Consults Infectious Disease  Chief Complaint     Chief Complaint   Patient presents with    Abdominal Pain      Sepsis of unknown source    History Obtained From   patient    History of Present Illness   This is a 70 year old male with history of diabetes, chronic pancreatitis, who presented to the ED because of abdominal pain.  He was afebrile. On exam described as having abdominal tenderness in RUQ, epigastrium and LUQ. WBC 26,000 with elevated procal. Urinalysis with moderate pyuria but no bacteria.   CXR was unremarkable.  CT Abdomen showed diffuse calcifications throughout the pancreas compatible with chronic pancreatitis with ascites and rim-enhancing collection posterior to the liver with multiple complex lesions within the left hepatic lobe, compatible with cyst or mass. Also distended gallbladder and dilated common duct. US showed hepatomegaly with intra and extrahepatic biliary dilatation, a hypoechoic left hepatic capsular mass and cystic right hepatic  mass; also mild gall bladder wall thickening. Blood and urine cultures collected.  Patient has been given dose of Cefepime and Flagyl.  ID has been consulted for this reason. GI also has been consulted.     Patient seen in the ED where he is resting comfortably.  States that he has had largely RUQ and epigastric pain for about 2 weeks along with intermittent nausea and vomiting but no diarrhea.  States that he had stents placed in his pancreas 6 ?weeks ago in [place], [place].  He affirms occasional dysuria with \"stinging\", no flank pain. No respiratory symptoms.   No travel history.     Past Medical History     Past Medical History:   Diagnosis Date    Chronic back pain     Chronic pancreatitis (HCC)     Chronic prescription opiate use 03/28/2025    COPD (chronic obstructive pulmonary disease) (HCC)     Diabetes mellitus

## 2025-03-28 NOTE — ED NOTES
MD notified of persistent low BP after 250 bolus. Verbal order to complete 500 mL LR bolus at this time, bolus from bag started at this time.

## 2025-03-29 ENCOUNTER — APPOINTMENT (OUTPATIENT)
Facility: HOSPITAL | Age: 70
DRG: 871 | End: 2025-03-29
Payer: MEDICARE

## 2025-03-29 ENCOUNTER — APPOINTMENT (OUTPATIENT)
Facility: HOSPITAL | Age: 70
DRG: 871 | End: 2025-03-29
Attending: STUDENT IN AN ORGANIZED HEALTH CARE EDUCATION/TRAINING PROGRAM
Payer: MEDICARE

## 2025-03-29 LAB
ALBUMIN SERPL-MCNC: 1.7 G/DL (ref 3.5–5)
ALBUMIN/GLOB SERPL: 0.4 (ref 1.1–2.2)
ALP SERPL-CCNC: 311 U/L (ref 45–117)
ALT SERPL-CCNC: 11 U/L (ref 12–78)
ANION GAP SERPL CALC-SCNC: 7 MMOL/L (ref 2–12)
AST SERPL W P-5'-P-CCNC: 16 U/L (ref 15–37)
BASOPHILS # BLD: 0 K/UL (ref 0–0.1)
BASOPHILS NFR BLD: 0 % (ref 0–1)
BILIRUB DIRECT SERPL-MCNC: 0.6 MG/DL (ref 0–0.2)
BILIRUB SERPL-MCNC: 1 MG/DL (ref 0.2–1)
BUN SERPL-MCNC: 35 MG/DL (ref 6–20)
BUN/CREAT SERPL: 16 (ref 12–20)
CA-I BLD-MCNC: 7.4 MG/DL (ref 8.5–10.1)
CHLORIDE SERPL-SCNC: 104 MMOL/L (ref 97–108)
CO2 SERPL-SCNC: 21 MMOL/L (ref 21–32)
CREAT SERPL-MCNC: 2.19 MG/DL (ref 0.7–1.3)
CRP SERPL-MCNC: 31.1 MG/DL (ref 0–0.3)
DIFFERENTIAL METHOD BLD: ABNORMAL
EOSINOPHIL # BLD: 0 K/UL (ref 0–0.4)
EOSINOPHIL NFR BLD: 0 % (ref 0–7)
ERYTHROCYTE [DISTWIDTH] IN BLOOD BY AUTOMATED COUNT: 17.1 % (ref 11.5–14.5)
FOLATE SERPL-MCNC: 11.7 NG/ML (ref 5–21)
GGT SERPL-CCNC: 249 U/L (ref 15–85)
GLOBULIN SER CALC-MCNC: 4.6 G/DL (ref 2–4)
GLUCOSE BLD STRIP.AUTO-MCNC: 139 MG/DL (ref 65–100)
GLUCOSE BLD STRIP.AUTO-MCNC: 151 MG/DL (ref 65–100)
GLUCOSE BLD STRIP.AUTO-MCNC: 155 MG/DL (ref 65–100)
GLUCOSE BLD STRIP.AUTO-MCNC: 179 MG/DL (ref 65–100)
GLUCOSE SERPL-MCNC: 156 MG/DL (ref 65–100)
HCT VFR BLD AUTO: 20.7 % (ref 36.6–50.3)
HCT VFR BLD AUTO: 23.2 % (ref 36.6–50.3)
HGB BLD-MCNC: 6.6 G/DL (ref 12.1–17)
HGB BLD-MCNC: 7.5 G/DL (ref 12.1–17)
IMM GRANULOCYTES # BLD AUTO: 0 K/UL
IMM GRANULOCYTES NFR BLD AUTO: 0 %
IRON SATN MFR SERPL: 20 % (ref 20–50)
IRON SERPL-MCNC: 15 UG/DL (ref 35–150)
LYMPHOCYTES # BLD: 0.94 K/UL (ref 0.8–3.5)
LYMPHOCYTES NFR BLD: 4 % (ref 12–49)
MCH RBC QN AUTO: 28.9 PG (ref 26–34)
MCHC RBC AUTO-ENTMCNC: 31.9 G/DL (ref 30–36.5)
MCV RBC AUTO: 90.8 FL (ref 80–99)
MONOCYTES # BLD: 0.7 K/UL (ref 0–1)
MONOCYTES NFR BLD: 3 % (ref 5–13)
NEUTS BAND NFR BLD MANUAL: 7 % (ref 0–6)
NEUTS SEG # BLD: 21.76 K/UL (ref 1.8–8)
NEUTS SEG NFR BLD: 86 % (ref 32–75)
NRBC # BLD: 0 K/UL (ref 0–0.01)
NRBC BLD-RTO: 0 PER 100 WBC
PERFORMED BY:: ABNORMAL
PLATELET # BLD AUTO: 322 K/UL (ref 150–400)
PMV BLD AUTO: 11.1 FL (ref 8.9–12.9)
POTASSIUM SERPL-SCNC: 4.8 MMOL/L (ref 3.5–5.1)
PROCALCITONIN SERPL-MCNC: 147.61 NG/ML
PROT SERPL-MCNC: 6.3 G/DL (ref 6.4–8.2)
RBC # BLD AUTO: 2.28 M/UL (ref 4.1–5.7)
RBC MORPH BLD: ABNORMAL
SODIUM SERPL-SCNC: 132 MMOL/L (ref 136–145)
TIBC SERPL-MCNC: 75 UG/DL (ref 250–450)
VIT B12 SERPL-MCNC: >2000 PG/ML (ref 193–986)
WBC # BLD AUTO: 23.4 K/UL (ref 4.1–11.1)

## 2025-03-29 PROCEDURE — 86900 BLOOD TYPING SEROLOGIC ABO: CPT

## 2025-03-29 PROCEDURE — 30233N1 TRANSFUSION OF NONAUTOLOGOUS RED BLOOD CELLS INTO PERIPHERAL VEIN, PERCUTANEOUS APPROACH: ICD-10-PCS

## 2025-03-29 PROCEDURE — 80048 BASIC METABOLIC PNL TOTAL CA: CPT

## 2025-03-29 PROCEDURE — 86850 RBC ANTIBODY SCREEN: CPT

## 2025-03-29 PROCEDURE — 80076 HEPATIC FUNCTION PANEL: CPT

## 2025-03-29 PROCEDURE — 82977 ASSAY OF GGT: CPT

## 2025-03-29 PROCEDURE — 86140 C-REACTIVE PROTEIN: CPT

## 2025-03-29 PROCEDURE — 2500000003 HC RX 250 WO HCPCS: Performed by: INTERNAL MEDICINE

## 2025-03-29 PROCEDURE — 84145 PROCALCITONIN (PCT): CPT

## 2025-03-29 PROCEDURE — 85014 HEMATOCRIT: CPT

## 2025-03-29 PROCEDURE — 6360000002 HC RX W HCPCS: Performed by: INTERNAL MEDICINE

## 2025-03-29 PROCEDURE — 6370000000 HC RX 637 (ALT 250 FOR IP): Performed by: INTERNAL MEDICINE

## 2025-03-29 PROCEDURE — 82962 GLUCOSE BLOOD TEST: CPT

## 2025-03-29 PROCEDURE — 85018 HEMOGLOBIN: CPT

## 2025-03-29 PROCEDURE — 85025 COMPLETE CBC W/AUTO DIFF WBC: CPT

## 2025-03-29 PROCEDURE — 51798 US URINE CAPACITY MEASURE: CPT

## 2025-03-29 PROCEDURE — 2000000000 HC ICU R&B

## 2025-03-29 PROCEDURE — 36430 TRANSFUSION BLD/BLD COMPNT: CPT

## 2025-03-29 PROCEDURE — 2580000003 HC RX 258: Performed by: INTERNAL MEDICINE

## 2025-03-29 PROCEDURE — 99233 SBSQ HOSP IP/OBS HIGH 50: CPT | Performed by: INTERNAL MEDICINE

## 2025-03-29 PROCEDURE — 6360000004 HC RX CONTRAST MEDICATION: Performed by: INTERNAL MEDICINE

## 2025-03-29 PROCEDURE — P9016 RBC LEUKOCYTES REDUCED: HCPCS

## 2025-03-29 PROCEDURE — 2500000003 HC RX 250 WO HCPCS: Performed by: NURSE PRACTITIONER

## 2025-03-29 PROCEDURE — 86301 IMMUNOASSAY TUMOR CA 19-9: CPT

## 2025-03-29 PROCEDURE — 6360000002 HC RX W HCPCS: Performed by: NURSE PRACTITIONER

## 2025-03-29 PROCEDURE — 74178 CT ABD&PLV WO CNTR FLWD CNTR: CPT

## 2025-03-29 PROCEDURE — 94761 N-INVAS EAR/PLS OXIMETRY MLT: CPT

## 2025-03-29 PROCEDURE — 86901 BLOOD TYPING SEROLOGIC RH(D): CPT

## 2025-03-29 PROCEDURE — 86923 COMPATIBILITY TEST ELECTRIC: CPT

## 2025-03-29 RX ORDER — SODIUM CHLORIDE 9 MG/ML
INJECTION, SOLUTION INTRAVENOUS PRN
Status: DISCONTINUED | OUTPATIENT
Start: 2025-03-29 | End: 2025-04-01

## 2025-03-29 RX ORDER — IOPAMIDOL 755 MG/ML
100 INJECTION, SOLUTION INTRAVASCULAR
Status: COMPLETED | OUTPATIENT
Start: 2025-03-29 | End: 2025-03-29

## 2025-03-29 RX ORDER — INSULIN LISPRO 100 [IU]/ML
0-8 INJECTION, SOLUTION INTRAVENOUS; SUBCUTANEOUS
Status: DISCONTINUED | OUTPATIENT
Start: 2025-03-29 | End: 2025-04-04 | Stop reason: HOSPADM

## 2025-03-29 RX ADMIN — SODIUM CHLORIDE, PRESERVATIVE FREE 10 ML: 5 INJECTION INTRAVENOUS at 20:40

## 2025-03-29 RX ADMIN — SODIUM CHLORIDE, PRESERVATIVE FREE 10 ML: 5 INJECTION INTRAVENOUS at 10:36

## 2025-03-29 RX ADMIN — IOPAMIDOL 100 ML: 755 INJECTION, SOLUTION INTRAVENOUS at 17:33

## 2025-03-29 RX ADMIN — PANCRELIPASE LIPASE, PANCRELIPASE PROTEASE, PANCRELIPASE AMYLASE 20000 UNITS: 20000; 63000; 84000 CAPSULE, DELAYED RELEASE ORAL at 12:14

## 2025-03-29 RX ADMIN — MEROPENEM 1000 MG: 1 INJECTION INTRAVENOUS at 06:27

## 2025-03-29 RX ADMIN — PANCRELIPASE LIPASE, PANCRELIPASE PROTEASE, PANCRELIPASE AMYLASE 5000 UNITS: 5000; 17000; 24000 CAPSULE, DELAYED RELEASE ORAL at 12:13

## 2025-03-29 RX ADMIN — MEROPENEM 1000 MG: 1 INJECTION INTRAVENOUS at 18:02

## 2025-03-29 RX ADMIN — SODIUM CHLORIDE: 0.9 INJECTION, SOLUTION INTRAVENOUS at 04:45

## 2025-03-29 RX ADMIN — SODIUM CHLORIDE: 0.9 INJECTION, SOLUTION INTRAVENOUS at 21:12

## 2025-03-29 RX ADMIN — SODIUM CHLORIDE: 0.9 INJECTION, SOLUTION INTRAVENOUS at 12:19

## 2025-03-29 RX ADMIN — HYDROMORPHONE HYDROCHLORIDE 1 MG: 1 INJECTION, SOLUTION INTRAMUSCULAR; INTRAVENOUS; SUBCUTANEOUS at 01:00

## 2025-03-29 RX ADMIN — HYDROMORPHONE HYDROCHLORIDE 1 MG: 1 INJECTION, SOLUTION INTRAMUSCULAR; INTRAVENOUS; SUBCUTANEOUS at 06:18

## 2025-03-29 RX ADMIN — PANCRELIPASE LIPASE, PANCRELIPASE PROTEASE, PANCRELIPASE AMYLASE 5000 UNITS: 5000; 17000; 24000 CAPSULE, DELAYED RELEASE ORAL at 17:53

## 2025-03-29 RX ADMIN — PANCRELIPASE LIPASE, PANCRELIPASE PROTEASE, PANCRELIPASE AMYLASE 20000 UNITS: 20000; 63000; 84000 CAPSULE, DELAYED RELEASE ORAL at 17:53

## 2025-03-29 RX ADMIN — HYDROMORPHONE HYDROCHLORIDE 1 MG: 1 INJECTION, SOLUTION INTRAMUSCULAR; INTRAVENOUS; SUBCUTANEOUS at 12:18

## 2025-03-29 RX ADMIN — SODIUM CHLORIDE, PRESERVATIVE FREE 20 MG: 5 INJECTION INTRAVENOUS at 10:34

## 2025-03-29 RX ADMIN — MORPHINE SULFATE 2 MG: 2 INJECTION, SOLUTION INTRAMUSCULAR; INTRAVENOUS at 02:35

## 2025-03-29 RX ADMIN — HYDROMORPHONE HYDROCHLORIDE 1 MG: 1 INJECTION, SOLUTION INTRAMUSCULAR; INTRAVENOUS; SUBCUTANEOUS at 17:52

## 2025-03-29 ASSESSMENT — PAIN DESCRIPTION - INTENSITY
RATING_2: 5
RATING_2: 9
RATING_2: 7
RATING_2: 3
RATING_2: 8

## 2025-03-29 ASSESSMENT — PAIN SCALES - GENERAL
PAINLEVEL_OUTOF10: 10
PAINLEVEL_OUTOF10: 5
PAINLEVEL_OUTOF10: 8
PAINLEVEL_OUTOF10: 3
PAINLEVEL_OUTOF10: 8
PAINLEVEL_OUTOF10: 5
PAINLEVEL_OUTOF10: 9
PAINLEVEL_OUTOF10: 5
PAINLEVEL_OUTOF10: 0
PAINLEVEL_OUTOF10: 2
PAINLEVEL_OUTOF10: 0
PAINLEVEL_OUTOF10: 8
PAINLEVEL_OUTOF10: 0
PAINLEVEL_OUTOF10: 8
PAINLEVEL_OUTOF10: 8

## 2025-03-29 ASSESSMENT — PAIN DESCRIPTION - DESCRIPTORS
DESCRIPTORS: ACHING
DESCRIPTORS_2: OTHER (COMMENT)
DESCRIPTORS: SHARP;PENETRATING
DESCRIPTORS: ACHING;DISCOMFORT
DESCRIPTORS_2: OTHER (COMMENT)

## 2025-03-29 ASSESSMENT — PAIN DESCRIPTION - ORIENTATION
ORIENTATION: LOWER
ORIENTATION: RIGHT;UPPER
ORIENTATION: LOWER
ORIENTATION: RIGHT;UPPER
ORIENTATION: RIGHT;UPPER
ORIENTATION: LOWER
ORIENTATION: LOWER

## 2025-03-29 ASSESSMENT — PAIN DESCRIPTION - LOCATION
LOCATION_2: ABDOMEN
LOCATION_2: ABDOMEN
LOCATION: BACK
LOCATION_2: ABDOMEN
LOCATION_2: ABDOMEN
LOCATION: ABDOMEN
LOCATION_2: ABDOMEN
LOCATION: BACK
LOCATION: ABDOMEN
LOCATION: ABDOMEN
LOCATION: BACK
LOCATION: BACK

## 2025-03-29 NOTE — CONSENT
Informed Consent for Blood Component Transfusion Note    I have discussed with the patient the rationale for blood component transfusion; its benefits in treating or preventing fatigue, organ damage, or death; and its risk which includes mild transfusion reactions, rare risk of blood borne infection, or more serious but rare reactions. I have discussed the alternatives to transfusion, including the risk and consequences of not receiving transfusion. The patient had an opportunity to ask questions and had agreed to proceed with transfusion of blood components.    Electronically signed by DANIELLE Arellano CNP on 3/29/25 at 6:36 AM EDT

## 2025-03-29 NOTE — CONSULTS
Gastroenterology Consult Note        Patient: Santos Laughlin MRN: [id number]  SSN: [id number]    YOB: 1955  Age: 70 y.o.  Sex: male      Subjective:      Santos Laughlin is a 70 y.o. male who is being seen for right abdominal pain, liver mass.  Patient was in ICU room, complaint pain, asked for pain medications, most of history from medical records, history from him,    EGD patient had alcohol chronic pancreatitis s/p recent pancreatic stent about 3 months ago, presented to the ED because of abdominal pain,  RUQ, epigastrium and LUQ.  It was documented the nausea no vomiting no hematemesis no fever, while emergency room his blood test showed WBC 26,000, Urinalysis with moderate pyuria but no bacteria.   CT Abdomen showed diffuse calcifications throughout the pancreas compatible with chronic pancreatitis with ascites and rim-enhancing collection posterior to the liver with multiple complex lesions within the left hepatic lobe, compatible with cyst or mass. Also distended gallbladder and dilated common duct.  His blood pressure was low, she was tachycardic, dehydrated, in the ED, patient given dose IVF bolus, Cefepime and Flagyl.  It was made ICU for evaluation, infectious disease consult placed, GI consultation placed for the liver mass, pancreatitis,.  Today patient seen in hospital, appeared to be comfortable without shortness of breath still have right abdominal pain.  Has no nausea vomiting no GI bleeding.  Past Medical History:   Diagnosis Date    Chronic back pain     Chronic pancreatitis (HCC)     Chronic prescription opiate use 03/28/2025    COPD (chronic obstructive pulmonary disease) (HCC)     Diabetes mellitus (HCC)     Ex-smoker     History of alcohol abuse     Not currently, many years ago    Hypertension     Presence of pancreatic duct stent      History reviewed. No pertinent surgical history.   History reviewed. No pertinent family history.  Social History     Tobacco Use

## 2025-03-29 NOTE — H&P
Name: Santos Laughlin   : 1955   MRN: [id number]   Date: 3/28/2025      REASON FOR ICU ADMISSION:  Septic Shock     PRINCIPLE ICU DIAGNOSIS   Septic shock  Multiple complex liver lesions  Fluid collection posterior to liver  Chronic pancreatitis with recent pancreatic stent  Dilated common bile duct  Ascites  MICAELA  Possible UTI  Anemia  DM    PATIENT SUMMARY   Santos Laughlin is a 70 y.o. male with PMH of Dm, chronic pancreatitis s/p recent pancreatic stent about 3 months ago, obesity, HTN, COPD. Presented to the ED because of abdominal pain.  He was afebrile. On exam described as having abdominal tenderness in RUQ, epigastrium and LUQ. WBC 26,000 with elevated procal. Urinalysis with moderate pyuria but no bacteria.   CXR was unremarkable.  CT Abdomen showed diffuse calcifications throughout the pancreas compatible with chronic pancreatitis with ascites and rim-enhancing collection posterior to the liver with multiple complex lesions within the left hepatic lobe, compatible with cyst or mass. Also distended gallbladder and dilated common duct. US showed hepatomegaly with intra and extrahepatic biliary dilatation, a hypoechoic left hepatic capsular mass and cystic right hepatic  mass; also mild gall bladder wall thickening. In the ED, patient given dose IVF bolus, Cefepime and Flagyl. Patient was hypotensive requiring norepinephrine. He is being admitted to the ICU for further management.     On exam, the patient is resting in bed in no apparent distress. He is on RA. No respiratory distress. Complains of RUQ pain and epigastric pain. SBP 80's on Norepinephrine 9mcg. Denies any CP or SOB at this time. Daughter at bedside. All questions answered.      COMPREHENSIVE ASSESSMENT & PLAN:SYSTEM BASED         NEUROLOGICAL:     No concerns at this time  No focal deficits  Continue neuro checks per unit protocol    PULMONOLOGY:     No concerns at this time  On RA  CXR clear      COPD hx  No evidence of 
produced using speech recognition software and may contain errors related to that system including errors in grammar, punctuation, and spelling, as well as words and phrases that may be inappropriate. If there are any questions or concerns, please feel free to contact the dictating provider for clarification.     Electronically signed by FELIX PINEDA MD on 3/28/2025 at 3:35 PM            I personally spent   60 minutes of critical care time total today.  This is time spent at this critically ill patient's bedside actively involved in patient care as well as the coordination of care and discussions with the patient's family.  This does not include any procedural time which has been billed separately.     Vital organ system(s) Affected: Cardiovascular (hypotension not responding to IV fluids)     Critical Intervention:   Initiate Levophed for blood pressure support, transfer care to ICU service

## 2025-03-29 NOTE — CARE COORDINATION
03/29/25 0919   Service Assessment   Patient Orientation Alert and Oriented   Cognition Alert   History Provided By Patient   Primary Caregiver Self   Support Systems Children   Patient's Healthcare Decision Maker is: Legal Next of Kin   PCP Verified by CM Yes   Last Visit to PCP Within last 3 months   Prior Functional Level Independent in ADLs/IADLs   Current Functional Level Independent in ADLs/IADLs   Can patient return to prior living arrangement Yes   Ability to make needs known: Good   Family able to assist with home care needs: Yes   Would you like for me to discuss the discharge plan with any other family members/significant others, and if so, who? Yes     Advance Care Planning     General Advance Care Planning (ACP) Conversation    Date of Conversation: 3/29/2025  Conducted with: Patient with Decision Making Capacity  Other persons present: None    Healthcare Decision Maker:   Primary Decision Maker: MCKEONBEN - Child - [phone]         Length of Voluntary ACP Conversation in minutes:  <16 minutes (Non-Billable)    Ger Desai RN         CM met with patient at bedside to complete discharge planning assessment. Patient lives [place] address on [place] alone.  Patient reports independence with ADLs/IADLs and drives self.  Only DME consists of a Cane.  No prior HH, SNF or IRF.  Patient plans to return home at time of discharge.  Current disposition is home/self.

## 2025-03-30 ENCOUNTER — APPOINTMENT (OUTPATIENT)
Facility: HOSPITAL | Age: 70
DRG: 871 | End: 2025-03-30
Attending: STUDENT IN AN ORGANIZED HEALTH CARE EDUCATION/TRAINING PROGRAM
Payer: MEDICARE

## 2025-03-30 LAB
-: NORMAL
ALBUMIN SERPL-MCNC: 1.5 G/DL (ref 3.5–5)
ALBUMIN/GLOB SERPL: 0.3 (ref 1.1–2.2)
ALP SERPL-CCNC: 322 U/L (ref 45–117)
ALT SERPL-CCNC: 11 U/L (ref 12–78)
AMMONIA PLAS-SCNC: <10 UMOL/L
ANION GAP SERPL CALC-SCNC: 7 MMOL/L (ref 2–12)
AST SERPL W P-5'-P-CCNC: 19 U/L (ref 15–37)
BACTERIA SPEC CULT: NORMAL
BASOPHILS # BLD: 0 K/UL (ref 0–0.1)
BASOPHILS NFR BLD: 0 % (ref 0–1)
BILIRUB DIRECT SERPL-MCNC: 0.5 MG/DL (ref 0–0.2)
BILIRUB SERPL-MCNC: 0.8 MG/DL (ref 0.2–1)
BUN SERPL-MCNC: 41 MG/DL (ref 6–20)
BUN/CREAT SERPL: 15 (ref 12–20)
CA-I BLD-MCNC: 7.4 MG/DL (ref 8.5–10.1)
CHLORIDE SERPL-SCNC: 107 MMOL/L (ref 97–108)
CO2 SERPL-SCNC: 19 MMOL/L (ref 21–32)
CREAT SERPL-MCNC: 2.67 MG/DL (ref 0.7–1.3)
CRP SERPL-MCNC: 27.7 MG/DL (ref 0–0.3)
DIFFERENTIAL METHOD BLD: ABNORMAL
ECHO AO ASC DIAM: 3.7 CM
ECHO AO ASCENDING AORTA INDEX: 1.66 CM/M2
ECHO AO ROOT DIAM: 3.1 CM
ECHO AO ROOT INDEX: 1.39 CM/M2
ECHO AV AREA PEAK VELOCITY: 3.3 CM2
ECHO AV AREA VTI: 3.5 CM2
ECHO AV AREA/BSA PEAK VELOCITY: 1.5 CM2/M2
ECHO AV AREA/BSA VTI: 1.6 CM2/M2
ECHO AV MEAN GRADIENT: 11 MMHG
ECHO AV MEAN VELOCITY: 1.5 M/S
ECHO AV PEAK GRADIENT: 19 MMHG
ECHO AV PEAK VELOCITY: 2.2 M/S
ECHO AV VELOCITY RATIO: 0.82
ECHO AV VTI: 33.9 CM
ECHO BSA: 2.29 M2
ECHO EST RA PRESSURE: 3 MMHG
ECHO IVC EXP: 1.7 CM
ECHO LA AREA 2C: 17.8 CM2
ECHO LA AREA 4C: 21.9 CM2
ECHO LA DIAMETER INDEX: 1.93 CM/M2
ECHO LA DIAMETER: 4.3 CM
ECHO LA MAJOR AXIS: 7.1 CM
ECHO LA MINOR AXIS: 5.4 CM
ECHO LA TO AORTIC ROOT RATIO: 1.39
ECHO LA VOL MOD A2C: 48 ML (ref 18–58)
ECHO LA VOL MOD A4C: 53 ML (ref 18–58)
ECHO LA VOLUME INDEX MOD A2C: 22 ML/M2 (ref 16–34)
ECHO LA VOLUME INDEX MOD A4C: 24 ML/M2 (ref 16–34)
ECHO LV E' LATERAL VELOCITY: 14.3 CM/S
ECHO LV E' SEPTAL VELOCITY: 5.77 CM/S
ECHO LV EDV A2C: 98 ML
ECHO LV EDV A4C: 110 ML
ECHO LV EDV INDEX A4C: 49 ML/M2
ECHO LV EDV NDEX A2C: 44 ML/M2
ECHO LV EF PHYSICIAN: 60 %
ECHO LV EJECTION FRACTION A2C: 52 %
ECHO LV EJECTION FRACTION A4C: 58 %
ECHO LV EJECTION FRACTION BIPLANE: 54 % (ref 55–100)
ECHO LV ESV A2C: 47 ML
ECHO LV ESV A4C: 47 ML
ECHO LV ESV INDEX A2C: 21 ML/M2
ECHO LV ESV INDEX A4C: 21 ML/M2
ECHO LV FRACTIONAL SHORTENING: 34 % (ref 28–44)
ECHO LV INTERNAL DIMENSION DIASTOLE INDEX: 2.11 CM/M2
ECHO LV INTERNAL DIMENSION DIASTOLIC: 4.7 CM (ref 4.2–5.9)
ECHO LV INTERNAL DIMENSION SYSTOLIC INDEX: 1.39 CM/M2
ECHO LV INTERNAL DIMENSION SYSTOLIC: 3.1 CM
ECHO LV IVSD: 1.5 CM (ref 0.6–1)
ECHO LV MASS 2D: 237.9 G (ref 88–224)
ECHO LV MASS INDEX 2D: 106.7 G/M2 (ref 49–115)
ECHO LV POSTERIOR WALL DIASTOLIC: 1.1 CM (ref 0.6–1)
ECHO LV RELATIVE WALL THICKNESS RATIO: 0.47
ECHO LVOT AREA: 4.2 CM2
ECHO LVOT AV VTI INDEX: 0.85
ECHO LVOT DIAM: 2.3 CM
ECHO LVOT MEAN GRADIENT: 7 MMHG
ECHO LVOT PEAK GRADIENT: 13 MMHG
ECHO LVOT PEAK VELOCITY: 1.8 M/S
ECHO LVOT STROKE VOLUME INDEX: 53.8 ML/M2
ECHO LVOT SV: 120 ML
ECHO LVOT VTI: 28.9 CM
ECHO MV A VELOCITY: 0.86 M/S
ECHO MV AREA VTI: 4.7 CM2
ECHO MV E DECELERATION TIME (DT): 237 MS
ECHO MV E VELOCITY: 0.69 M/S
ECHO MV E/A RATIO: 0.8
ECHO MV E/E' LATERAL: 4.83
ECHO MV E/E' RATIO (AVERAGED): 8.39
ECHO MV E/E' SEPTAL: 11.96
ECHO MV LVOT VTI INDEX: 0.88
ECHO MV MAX VELOCITY: 1.1 M/S
ECHO MV MEAN GRADIENT: 3 MMHG
ECHO MV MEAN VELOCITY: 0.8 M/S
ECHO MV PEAK GRADIENT: 5 MMHG
ECHO MV VTI: 25.5 CM
ECHO PV MAX VELOCITY: 1.1 M/S
ECHO PV MEAN GRADIENT: 2 MMHG
ECHO PV MEAN VELOCITY: 0.7 M/S
ECHO PV PEAK GRADIENT: 4 MMHG
ECHO PV VTI: 17.5 CM
ECHO RA AREA 4C: 15.5 CM2
ECHO RA END SYSTOLIC VOLUME APICAL 4 CHAMBER INDEX BSA: 15 ML/M2
ECHO RA VOLUME: 33 ML
ECHO RIGHT VENTRICULAR SYSTOLIC PRESSURE (RVSP): 36 MMHG
ECHO RV BASAL DIMENSION: 3.6 CM
ECHO RV FREE WALL PEAK S': 18 CM/S
ECHO RV TAPSE: 1.9 CM (ref 1.7–?)
ECHO TV REGURGITANT MAX VELOCITY: 2.87 M/S
ECHO TV REGURGITANT PEAK GRADIENT: 33 MMHG
EOSINOPHIL # BLD: 0 K/UL (ref 0–0.4)
EOSINOPHIL NFR BLD: 0 % (ref 0–7)
ERYTHROCYTE [DISTWIDTH] IN BLOOD BY AUTOMATED COUNT: 16.8 % (ref 11.5–14.5)
ERYTHROCYTE [DISTWIDTH] IN BLOOD BY AUTOMATED COUNT: 17.2 % (ref 11.5–14.5)
GLOBULIN SER CALC-MCNC: 4.8 G/DL (ref 2–4)
GLUCOSE BLD STRIP.AUTO-MCNC: 103 MG/DL (ref 65–100)
GLUCOSE BLD STRIP.AUTO-MCNC: 125 MG/DL (ref 65–100)
GLUCOSE BLD STRIP.AUTO-MCNC: 125 MG/DL (ref 65–100)
GLUCOSE BLD STRIP.AUTO-MCNC: 129 MG/DL (ref 65–100)
GLUCOSE SERPL-MCNC: 128 MG/DL (ref 65–100)
HAV IGM SER QL: NONREACTIVE
HBV CORE IGM SER QL: NONREACTIVE
HBV SURFACE AG SER QL: <0.1 INDEX
HBV SURFACE AG SER QL: NEGATIVE
HCT VFR BLD AUTO: 21 % (ref 36.6–50.3)
HCT VFR BLD AUTO: 22.8 % (ref 36.6–50.3)
HCV AB SER IA-ACNC: 0.14 INDEX
HCV AB SERPL QL IA: NONREACTIVE
HGB BLD-MCNC: 7 G/DL (ref 12.1–17)
HGB BLD-MCNC: 7.2 G/DL (ref 12.1–17)
IMM GRANULOCYTES # BLD AUTO: 0 K/UL
IMM GRANULOCYTES NFR BLD AUTO: 0 %
LIPASE SERPL-CCNC: <10 U/L (ref 13–75)
LYMPHOCYTES # BLD: 0.68 K/UL (ref 0.8–3.5)
LYMPHOCYTES NFR BLD: 3 % (ref 12–49)
Lab: NORMAL
MCH RBC QN AUTO: 28.8 PG (ref 26–34)
MCH RBC QN AUTO: 30.6 PG (ref 26–34)
MCHC RBC AUTO-ENTMCNC: 31.6 G/DL (ref 30–36.5)
MCHC RBC AUTO-ENTMCNC: 33.3 G/DL (ref 30–36.5)
MCV RBC AUTO: 91.2 FL (ref 80–99)
MCV RBC AUTO: 91.7 FL (ref 80–99)
MONOCYTES # BLD: 0.68 K/UL (ref 0–1)
MONOCYTES NFR BLD: 3 % (ref 5–13)
NEUTS BAND NFR BLD MANUAL: 2 % (ref 0–6)
NEUTS SEG # BLD: 21.14 K/UL (ref 1.8–8)
NEUTS SEG NFR BLD: 92 % (ref 32–75)
NRBC # BLD: 0 K/UL (ref 0–0.01)
NRBC # BLD: 0 K/UL (ref 0–0.01)
NRBC BLD-RTO: 0 PER 100 WBC
NRBC BLD-RTO: 0 PER 100 WBC
PERFORMED BY:: ABNORMAL
PLATELET # BLD AUTO: 299 K/UL (ref 150–400)
PLATELET # BLD AUTO: 313 K/UL (ref 150–400)
PMV BLD AUTO: 10.6 FL (ref 8.9–12.9)
PMV BLD AUTO: 11 FL (ref 8.9–12.9)
POTASSIUM SERPL-SCNC: 5.1 MMOL/L (ref 3.5–5.1)
PROCALCITONIN SERPL-MCNC: 82.14 NG/ML
PROT SERPL-MCNC: 6.3 G/DL (ref 6.4–8.2)
RBC # BLD AUTO: 2.29 M/UL (ref 4.1–5.7)
RBC # BLD AUTO: 2.5 M/UL (ref 4.1–5.7)
RBC MORPH BLD: ABNORMAL
SODIUM SERPL-SCNC: 133 MMOL/L (ref 136–145)
WBC # BLD AUTO: 18.7 K/UL (ref 4.1–11.1)
WBC # BLD AUTO: 22.5 K/UL (ref 4.1–11.1)

## 2025-03-30 PROCEDURE — 6360000002 HC RX W HCPCS: Performed by: STUDENT IN AN ORGANIZED HEALTH CARE EDUCATION/TRAINING PROGRAM

## 2025-03-30 PROCEDURE — 2580000003 HC RX 258: Performed by: STUDENT IN AN ORGANIZED HEALTH CARE EDUCATION/TRAINING PROGRAM

## 2025-03-30 PROCEDURE — 82105 ALPHA-FETOPROTEIN SERUM: CPT

## 2025-03-30 PROCEDURE — 2580000003 HC RX 258: Performed by: INTERNAL MEDICINE

## 2025-03-30 PROCEDURE — 36415 COLL VENOUS BLD VENIPUNCTURE: CPT

## 2025-03-30 PROCEDURE — 6360000002 HC RX W HCPCS: Performed by: INTERNAL MEDICINE

## 2025-03-30 PROCEDURE — 83690 ASSAY OF LIPASE: CPT

## 2025-03-30 PROCEDURE — 85025 COMPLETE CBC W/AUTO DIFF WBC: CPT

## 2025-03-30 PROCEDURE — 94761 N-INVAS EAR/PLS OXIMETRY MLT: CPT

## 2025-03-30 PROCEDURE — 80076 HEPATIC FUNCTION PANEL: CPT

## 2025-03-30 PROCEDURE — 86140 C-REACTIVE PROTEIN: CPT

## 2025-03-30 PROCEDURE — 82962 GLUCOSE BLOOD TEST: CPT

## 2025-03-30 PROCEDURE — 80048 BASIC METABOLIC PNL TOTAL CA: CPT

## 2025-03-30 PROCEDURE — 84145 PROCALCITONIN (PCT): CPT

## 2025-03-30 PROCEDURE — 2000000000 HC ICU R&B

## 2025-03-30 PROCEDURE — P9045 ALBUMIN (HUMAN), 5%, 250 ML: HCPCS | Performed by: STUDENT IN AN ORGANIZED HEALTH CARE EDUCATION/TRAINING PROGRAM

## 2025-03-30 PROCEDURE — 85027 COMPLETE CBC AUTOMATED: CPT

## 2025-03-30 PROCEDURE — 80074 ACUTE HEPATITIS PANEL: CPT

## 2025-03-30 PROCEDURE — 3E033XZ INTRODUCTION OF VASOPRESSOR INTO PERIPHERAL VEIN, PERCUTANEOUS APPROACH: ICD-10-PCS

## 2025-03-30 PROCEDURE — 6370000000 HC RX 637 (ALT 250 FOR IP): Performed by: STUDENT IN AN ORGANIZED HEALTH CARE EDUCATION/TRAINING PROGRAM

## 2025-03-30 PROCEDURE — 82140 ASSAY OF AMMONIA: CPT

## 2025-03-30 PROCEDURE — 2500000003 HC RX 250 WO HCPCS: Performed by: STUDENT IN AN ORGANIZED HEALTH CARE EDUCATION/TRAINING PROGRAM

## 2025-03-30 PROCEDURE — 51798 US URINE CAPACITY MEASURE: CPT

## 2025-03-30 PROCEDURE — 93306 TTE W/DOPPLER COMPLETE: CPT

## 2025-03-30 PROCEDURE — 99233 SBSQ HOSP IP/OBS HIGH 50: CPT | Performed by: INTERNAL MEDICINE

## 2025-03-30 PROCEDURE — 2500000003 HC RX 250 WO HCPCS: Performed by: NURSE PRACTITIONER

## 2025-03-30 PROCEDURE — 6370000000 HC RX 637 (ALT 250 FOR IP): Performed by: INTERNAL MEDICINE

## 2025-03-30 RX ORDER — ALBUMIN HUMAN 50 G/1000ML
25 SOLUTION INTRAVENOUS ONCE
Status: COMPLETED | OUTPATIENT
Start: 2025-03-30 | End: 2025-03-30

## 2025-03-30 RX ORDER — QUETIAPINE FUMARATE 25 MG/1
25 TABLET, FILM COATED ORAL 2 TIMES DAILY
Status: DISCONTINUED | OUTPATIENT
Start: 2025-03-30 | End: 2025-04-04 | Stop reason: HOSPADM

## 2025-03-30 RX ORDER — AMLODIPINE BESYLATE 5 MG/1
10 TABLET ORAL DAILY
Status: DISCONTINUED | OUTPATIENT
Start: 2025-03-30 | End: 2025-04-01

## 2025-03-30 RX ORDER — DEXMEDETOMIDINE HYDROCHLORIDE 4 UG/ML
.1-1.5 INJECTION, SOLUTION INTRAVENOUS CONTINUOUS
Status: DISCONTINUED | OUTPATIENT
Start: 2025-03-30 | End: 2025-03-30

## 2025-03-30 RX ORDER — NOREPINEPHRINE BITARTRATE 0.06 MG/ML
1-30 INJECTION, SOLUTION INTRAVENOUS CONTINUOUS
Status: DISCONTINUED | OUTPATIENT
Start: 2025-03-30 | End: 2025-03-31

## 2025-03-30 RX ORDER — LOSARTAN POTASSIUM 25 MG/1
12.5 TABLET ORAL DAILY
Status: DISCONTINUED | OUTPATIENT
Start: 2025-03-30 | End: 2025-04-01

## 2025-03-30 RX ADMIN — SODIUM CHLORIDE: 0.9 INJECTION, SOLUTION INTRAVENOUS at 04:33

## 2025-03-30 RX ADMIN — HYDROMORPHONE HYDROCHLORIDE 1 MG: 1 INJECTION, SOLUTION INTRAMUSCULAR; INTRAVENOUS; SUBCUTANEOUS at 01:26

## 2025-03-30 RX ADMIN — ALBUMIN (HUMAN) 25 G: 12.5 INJECTION, SOLUTION INTRAVENOUS at 13:27

## 2025-03-30 RX ADMIN — PANCRELIPASE LIPASE, PANCRELIPASE PROTEASE, PANCRELIPASE AMYLASE 20000 UNITS: 20000; 63000; 84000 CAPSULE, DELAYED RELEASE ORAL at 18:40

## 2025-03-30 RX ADMIN — SODIUM CHLORIDE: 0.9 INJECTION, SOLUTION INTRAVENOUS at 12:48

## 2025-03-30 RX ADMIN — DEXMEDETOMIDINE HYDROCHLORIDE 0.2 MCG/KG/HR: 400 INJECTION, SOLUTION INTRAVENOUS at 10:10

## 2025-03-30 RX ADMIN — SODIUM CHLORIDE, PRESERVATIVE FREE 5 ML: 5 INJECTION INTRAVENOUS at 21:30

## 2025-03-30 RX ADMIN — HYDROMORPHONE HYDROCHLORIDE 1 MG: 1 INJECTION, SOLUTION INTRAMUSCULAR; INTRAVENOUS; SUBCUTANEOUS at 06:08

## 2025-03-30 RX ADMIN — MEROPENEM 1000 MG: 1 INJECTION INTRAVENOUS at 18:42

## 2025-03-30 RX ADMIN — QUETIAPINE FUMARATE 25 MG: 25 TABLET ORAL at 20:50

## 2025-03-30 RX ADMIN — PANCRELIPASE LIPASE, PANCRELIPASE PROTEASE, PANCRELIPASE AMYLASE 5000 UNITS: 5000; 17000; 24000 CAPSULE, DELAYED RELEASE ORAL at 09:11

## 2025-03-30 RX ADMIN — MEROPENEM 1000 MG: 1 INJECTION INTRAVENOUS at 06:28

## 2025-03-30 RX ADMIN — PANCRELIPASE LIPASE, PANCRELIPASE PROTEASE, PANCRELIPASE AMYLASE 5000 UNITS: 5000; 17000; 24000 CAPSULE, DELAYED RELEASE ORAL at 12:52

## 2025-03-30 RX ADMIN — SODIUM CHLORIDE, PRESERVATIVE FREE 20 MG: 5 INJECTION INTRAVENOUS at 09:11

## 2025-03-30 RX ADMIN — Medication 5 MCG/MIN: at 18:43

## 2025-03-30 RX ADMIN — PANCRELIPASE LIPASE, PANCRELIPASE PROTEASE, PANCRELIPASE AMYLASE 5000 UNITS: 5000; 17000; 24000 CAPSULE, DELAYED RELEASE ORAL at 18:40

## 2025-03-30 RX ADMIN — PANCRELIPASE LIPASE, PANCRELIPASE PROTEASE, PANCRELIPASE AMYLASE 20000 UNITS: 20000; 63000; 84000 CAPSULE, DELAYED RELEASE ORAL at 12:52

## 2025-03-30 RX ADMIN — PANCRELIPASE LIPASE, PANCRELIPASE PROTEASE, PANCRELIPASE AMYLASE 20000 UNITS: 20000; 63000; 84000 CAPSULE, DELAYED RELEASE ORAL at 09:11

## 2025-03-30 ASSESSMENT — PAIN SCALES - GENERAL
PAINLEVEL_OUTOF10: 8
PAINLEVEL_OUTOF10: 0
PAINLEVEL_OUTOF10: 4

## 2025-03-30 ASSESSMENT — PAIN DESCRIPTION - ORIENTATION
ORIENTATION: RIGHT;UPPER
ORIENTATION: RIGHT;UPPER

## 2025-03-30 ASSESSMENT — PAIN DESCRIPTION - DESCRIPTORS
DESCRIPTORS: ACHING
DESCRIPTORS: ACHING;TENDER

## 2025-03-30 ASSESSMENT — PAIN DESCRIPTION - LOCATION
LOCATION: ABDOMEN
LOCATION: ABDOMEN

## 2025-03-31 PROBLEM — E44.0 MODERATE MALNUTRITION: Status: ACTIVE | Noted: 2025-03-31

## 2025-03-31 LAB
AFP-TM SERPL-MCNC: <1.8 NG/ML (ref 0–8.4)
ALBUMIN SERPL-MCNC: 1.5 G/DL (ref 3.5–5)
ALBUMIN/GLOB SERPL: 0.3 (ref 1.1–2.2)
ALP SERPL-CCNC: 342 U/L (ref 45–117)
ALT SERPL-CCNC: 10 U/L (ref 12–78)
ANION GAP SERPL CALC-SCNC: 9 MMOL/L (ref 2–12)
AST SERPL W P-5'-P-CCNC: 17 U/L (ref 15–37)
BASOPHILS # BLD: 0 K/UL (ref 0–0.1)
BASOPHILS NFR BLD: 0 % (ref 0–1)
BILIRUB DIRECT SERPL-MCNC: 0.5 MG/DL (ref 0–0.2)
BILIRUB SERPL-MCNC: 0.8 MG/DL (ref 0.2–1)
BUN SERPL-MCNC: 44 MG/DL (ref 6–20)
BUN/CREAT SERPL: 17 (ref 12–20)
CA-I BLD-MCNC: 7.4 MG/DL (ref 8.5–10.1)
CANCER AG19-9 SERPL-ACNC: 267 U/ML (ref 0–35)
CHLORIDE SERPL-SCNC: 108 MMOL/L (ref 97–108)
CO2 SERPL-SCNC: 16 MMOL/L (ref 21–32)
CREAT SERPL-MCNC: 2.64 MG/DL (ref 0.7–1.3)
CRP SERPL-MCNC: 25 MG/DL (ref 0–0.3)
DIFFERENTIAL METHOD BLD: ABNORMAL
EOSINOPHIL # BLD: 0 K/UL (ref 0–0.4)
EOSINOPHIL NFR BLD: 0 % (ref 0–7)
ERYTHROCYTE [DISTWIDTH] IN BLOOD BY AUTOMATED COUNT: 17.2 % (ref 11.5–14.5)
GLOBULIN SER CALC-MCNC: 5 G/DL (ref 2–4)
GLUCOSE BLD STRIP.AUTO-MCNC: 163 MG/DL (ref 65–100)
GLUCOSE BLD STRIP.AUTO-MCNC: 213 MG/DL (ref 65–100)
GLUCOSE BLD STRIP.AUTO-MCNC: 263 MG/DL (ref 65–100)
GLUCOSE SERPL-MCNC: 160 MG/DL (ref 65–100)
HCT VFR BLD AUTO: 22.3 % (ref 36.6–50.3)
HGB BLD-MCNC: 7 G/DL (ref 12.1–17)
IMM GRANULOCYTES # BLD AUTO: 0 K/UL
IMM GRANULOCYTES NFR BLD AUTO: 0 %
LIPASE SERPL-CCNC: 8 U/L (ref 13–75)
LYMPHOCYTES # BLD: 1.14 K/UL (ref 0.8–3.5)
LYMPHOCYTES NFR BLD: 7 % (ref 12–49)
MCH RBC QN AUTO: 28.6 PG (ref 26–34)
MCHC RBC AUTO-ENTMCNC: 31.4 G/DL (ref 30–36.5)
MCV RBC AUTO: 91 FL (ref 80–99)
MONOCYTES # BLD: 0.82 K/UL (ref 0–1)
MONOCYTES NFR BLD: 5 % (ref 5–13)
NEUTS BAND NFR BLD MANUAL: 1 % (ref 0–6)
NEUTS SEG # BLD: 14.34 K/UL (ref 1.8–8)
NEUTS SEG NFR BLD: 87 % (ref 32–75)
NRBC # BLD: 0 K/UL (ref 0–0.01)
NRBC BLD-RTO: 0 PER 100 WBC
PERFORMED BY:: ABNORMAL
PLATELET # BLD AUTO: 302 K/UL (ref 150–400)
PMV BLD AUTO: 10.9 FL (ref 8.9–12.9)
POTASSIUM SERPL-SCNC: 4.9 MMOL/L (ref 3.5–5.1)
PROCALCITONIN SERPL-MCNC: 45.38 NG/ML
PROT SERPL-MCNC: 6.5 G/DL (ref 6.4–8.2)
RBC # BLD AUTO: 2.45 M/UL (ref 4.1–5.7)
RBC MORPH BLD: ABNORMAL
SODIUM SERPL-SCNC: 133 MMOL/L (ref 136–145)
WBC # BLD AUTO: 16.3 K/UL (ref 4.1–11.1)

## 2025-03-31 PROCEDURE — 2000000000 HC ICU R&B

## 2025-03-31 PROCEDURE — 6370000000 HC RX 637 (ALT 250 FOR IP): Performed by: STUDENT IN AN ORGANIZED HEALTH CARE EDUCATION/TRAINING PROGRAM

## 2025-03-31 PROCEDURE — 36415 COLL VENOUS BLD VENIPUNCTURE: CPT

## 2025-03-31 PROCEDURE — 6360000002 HC RX W HCPCS: Performed by: NURSE PRACTITIONER

## 2025-03-31 PROCEDURE — 2500000003 HC RX 250 WO HCPCS: Performed by: STUDENT IN AN ORGANIZED HEALTH CARE EDUCATION/TRAINING PROGRAM

## 2025-03-31 PROCEDURE — 2580000003 HC RX 258: Performed by: STUDENT IN AN ORGANIZED HEALTH CARE EDUCATION/TRAINING PROGRAM

## 2025-03-31 PROCEDURE — 85025 COMPLETE CBC W/AUTO DIFF WBC: CPT

## 2025-03-31 PROCEDURE — 99233 SBSQ HOSP IP/OBS HIGH 50: CPT | Performed by: INTERNAL MEDICINE

## 2025-03-31 PROCEDURE — 80048 BASIC METABOLIC PNL TOTAL CA: CPT

## 2025-03-31 PROCEDURE — 80076 HEPATIC FUNCTION PANEL: CPT

## 2025-03-31 PROCEDURE — 82962 GLUCOSE BLOOD TEST: CPT

## 2025-03-31 PROCEDURE — 6360000002 HC RX W HCPCS: Performed by: STUDENT IN AN ORGANIZED HEALTH CARE EDUCATION/TRAINING PROGRAM

## 2025-03-31 PROCEDURE — 86140 C-REACTIVE PROTEIN: CPT

## 2025-03-31 PROCEDURE — 84145 PROCALCITONIN (PCT): CPT

## 2025-03-31 PROCEDURE — 83690 ASSAY OF LIPASE: CPT

## 2025-03-31 PROCEDURE — 94761 N-INVAS EAR/PLS OXIMETRY MLT: CPT

## 2025-03-31 PROCEDURE — 2500000003 HC RX 250 WO HCPCS: Performed by: NURSE PRACTITIONER

## 2025-03-31 RX ADMIN — MEROPENEM 1000 MG: 1 INJECTION INTRAVENOUS at 18:19

## 2025-03-31 RX ADMIN — QUETIAPINE FUMARATE 25 MG: 25 TABLET ORAL at 21:04

## 2025-03-31 RX ADMIN — SODIUM CHLORIDE, PRESERVATIVE FREE 10 ML: 5 INJECTION INTRAVENOUS at 21:11

## 2025-03-31 RX ADMIN — PANCRELIPASE LIPASE, PANCRELIPASE PROTEASE, PANCRELIPASE AMYLASE 20000 UNITS: 20000; 63000; 84000 CAPSULE, DELAYED RELEASE ORAL at 17:13

## 2025-03-31 RX ADMIN — PANCRELIPASE LIPASE, PANCRELIPASE PROTEASE, PANCRELIPASE AMYLASE 5000 UNITS: 5000; 17000; 24000 CAPSULE, DELAYED RELEASE ORAL at 09:21

## 2025-03-31 RX ADMIN — MEROPENEM 1000 MG: 1 INJECTION INTRAVENOUS at 06:51

## 2025-03-31 RX ADMIN — PANCRELIPASE LIPASE, PANCRELIPASE PROTEASE, PANCRELIPASE AMYLASE 5000 UNITS: 5000; 17000; 24000 CAPSULE, DELAYED RELEASE ORAL at 13:00

## 2025-03-31 RX ADMIN — PANCRELIPASE LIPASE, PANCRELIPASE PROTEASE, PANCRELIPASE AMYLASE 5000 UNITS: 5000; 17000; 24000 CAPSULE, DELAYED RELEASE ORAL at 17:13

## 2025-03-31 RX ADMIN — INSULIN LISPRO 4 UNITS: 100 INJECTION, SOLUTION INTRAVENOUS; SUBCUTANEOUS at 13:03

## 2025-03-31 RX ADMIN — PANCRELIPASE LIPASE, PANCRELIPASE PROTEASE, PANCRELIPASE AMYLASE 20000 UNITS: 20000; 63000; 84000 CAPSULE, DELAYED RELEASE ORAL at 09:21

## 2025-03-31 RX ADMIN — QUETIAPINE FUMARATE 25 MG: 25 TABLET ORAL at 09:21

## 2025-03-31 RX ADMIN — INSULIN LISPRO 2 UNITS: 100 INJECTION, SOLUTION INTRAVENOUS; SUBCUTANEOUS at 21:11

## 2025-03-31 RX ADMIN — MORPHINE SULFATE 2 MG: 2 INJECTION, SOLUTION INTRAMUSCULAR; INTRAVENOUS at 21:05

## 2025-03-31 RX ADMIN — PANCRELIPASE LIPASE, PANCRELIPASE PROTEASE, PANCRELIPASE AMYLASE 20000 UNITS: 20000; 63000; 84000 CAPSULE, DELAYED RELEASE ORAL at 13:03

## 2025-03-31 RX ADMIN — SODIUM CHLORIDE, PRESERVATIVE FREE 20 MG: 5 INJECTION INTRAVENOUS at 09:21

## 2025-03-31 ASSESSMENT — PAIN SCALES - GENERAL
PAINLEVEL_OUTOF10: 9
PAINLEVEL_OUTOF10: 3
PAINLEVEL_OUTOF10: 2
PAINLEVEL_OUTOF10: 0

## 2025-03-31 ASSESSMENT — PAIN DESCRIPTION - LOCATION
LOCATION: BACK;ABDOMEN
LOCATION: BACK;ABDOMEN;NECK

## 2025-03-31 NOTE — CARE COORDINATION
CM reviewed Pt medicals, not certain if Pt lives with family or lives alone. Pt is IND with ADL.    Will need PT/OT eval/recommendations.     Per IDR    Pt admitted for liver mass, severe sepsis, acute on chronic pancreatitis.     Regular diet, eating with no issues     Pt lives alone but has a daughter who lives next door to him to assist taking care of Pt.

## 2025-04-01 ENCOUNTER — APPOINTMENT (OUTPATIENT)
Facility: HOSPITAL | Age: 70
DRG: 871 | End: 2025-04-01
Payer: MEDICARE

## 2025-04-01 LAB
ANION GAP SERPL CALC-SCNC: 10 MMOL/L (ref 2–12)
BACTERIA SPEC CULT: NORMAL
BASOPHILS # BLD: 0.24 K/UL (ref 0–0.1)
BASOPHILS NFR BLD: 2 % (ref 0–1)
BUN SERPL-MCNC: 43 MG/DL (ref 6–20)
BUN/CREAT SERPL: 27 (ref 12–20)
CA-I BLD-MCNC: 8 MG/DL (ref 8.5–10.1)
CHLORIDE SERPL-SCNC: 108 MMOL/L (ref 97–108)
CO2 SERPL-SCNC: 18 MMOL/L (ref 21–32)
CREAT SERPL-MCNC: 1.6 MG/DL (ref 0.7–1.3)
CRP SERPL-MCNC: 17.6 MG/DL (ref 0–0.3)
DIFFERENTIAL METHOD BLD: ABNORMAL
EOSINOPHIL # BLD: 0 K/UL (ref 0–0.4)
EOSINOPHIL NFR BLD: 0 % (ref 0–7)
ERYTHROCYTE [DISTWIDTH] IN BLOOD BY AUTOMATED COUNT: 17.7 % (ref 11.5–14.5)
GLUCOSE BLD STRIP.AUTO-MCNC: 116 MG/DL (ref 65–100)
GLUCOSE BLD STRIP.AUTO-MCNC: 117 MG/DL (ref 65–100)
GLUCOSE BLD STRIP.AUTO-MCNC: 122 MG/DL (ref 65–100)
GLUCOSE BLD STRIP.AUTO-MCNC: 202 MG/DL (ref 65–100)
GLUCOSE BLD STRIP.AUTO-MCNC: 232 MG/DL (ref 65–100)
GLUCOSE SERPL-MCNC: 187 MG/DL (ref 65–100)
HCT VFR BLD AUTO: 21.3 % (ref 36.6–50.3)
HCT VFR BLD AUTO: 25.3 % (ref 36.6–50.3)
HGB BLD-MCNC: 6.8 G/DL (ref 12.1–17)
HGB BLD-MCNC: 8.1 G/DL (ref 12.1–17)
IMM GRANULOCYTES # BLD AUTO: 0 K/UL
IMM GRANULOCYTES NFR BLD AUTO: 0 %
LIPASE SERPL-CCNC: 6 U/L (ref 13–75)
LYMPHOCYTES # BLD: 0.35 K/UL (ref 0.8–3.5)
LYMPHOCYTES NFR BLD: 3 % (ref 12–49)
Lab: NORMAL
MAGNESIUM SERPL-MCNC: 1.4 MG/DL (ref 1.6–2.4)
MCH RBC QN AUTO: 28.9 PG (ref 26–34)
MCHC RBC AUTO-ENTMCNC: 31.9 G/DL (ref 30–36.5)
MCV RBC AUTO: 90.6 FL (ref 80–99)
MONOCYTES # BLD: 0.35 K/UL (ref 0–1)
MONOCYTES NFR BLD: 3 % (ref 5–13)
MYELOCYTES NFR BLD MANUAL: 1 %
NEUTS BAND NFR BLD MANUAL: 1 % (ref 0–6)
NEUTS SEG # BLD: 10.74 K/UL (ref 1.8–8)
NEUTS SEG NFR BLD: 90 % (ref 32–75)
NRBC # BLD: 0 K/UL (ref 0–0.01)
NRBC BLD-RTO: 0 PER 100 WBC
PERFORMED BY:: ABNORMAL
PHOSPHATE SERPL-MCNC: 4.9 MG/DL (ref 2.6–4.7)
PLATELET # BLD AUTO: 276 K/UL (ref 150–400)
PMV BLD AUTO: 10.7 FL (ref 8.9–12.9)
POTASSIUM SERPL-SCNC: 4.4 MMOL/L (ref 3.5–5.1)
PROCALCITONIN SERPL-MCNC: 22.58 NG/ML
RBC # BLD AUTO: 2.35 M/UL (ref 4.1–5.7)
RBC MORPH BLD: ABNORMAL
SODIUM SERPL-SCNC: 136 MMOL/L (ref 136–145)
WBC # BLD AUTO: 11.8 K/UL (ref 4.1–11.1)
WBC MORPH BLD: ABNORMAL

## 2025-04-01 PROCEDURE — 2500000003 HC RX 250 WO HCPCS: Performed by: STUDENT IN AN ORGANIZED HEALTH CARE EDUCATION/TRAINING PROGRAM

## 2025-04-01 PROCEDURE — 6370000000 HC RX 637 (ALT 250 FOR IP): Performed by: STUDENT IN AN ORGANIZED HEALTH CARE EDUCATION/TRAINING PROGRAM

## 2025-04-01 PROCEDURE — 85018 HEMOGLOBIN: CPT

## 2025-04-01 PROCEDURE — 82962 GLUCOSE BLOOD TEST: CPT

## 2025-04-01 PROCEDURE — 6360000002 HC RX W HCPCS: Performed by: STUDENT IN AN ORGANIZED HEALTH CARE EDUCATION/TRAINING PROGRAM

## 2025-04-01 PROCEDURE — 87449 NOS EACH ORGANISM AG IA: CPT

## 2025-04-01 PROCEDURE — 94640 AIRWAY INHALATION TREATMENT: CPT

## 2025-04-01 PROCEDURE — 2500000003 HC RX 250 WO HCPCS: Performed by: NURSE PRACTITIONER

## 2025-04-01 PROCEDURE — 86738 MYCOPLASMA ANTIBODY: CPT

## 2025-04-01 PROCEDURE — 85014 HEMATOCRIT: CPT

## 2025-04-01 PROCEDURE — 2700000000 HC OXYGEN THERAPY PER DAY

## 2025-04-01 PROCEDURE — 99232 SBSQ HOSP IP/OBS MODERATE 35: CPT | Performed by: INTERNAL MEDICINE

## 2025-04-01 PROCEDURE — 6360000002 HC RX W HCPCS: Performed by: INTERNAL MEDICINE

## 2025-04-01 PROCEDURE — 71045 X-RAY EXAM CHEST 1 VIEW: CPT

## 2025-04-01 PROCEDURE — P9016 RBC LEUKOCYTES REDUCED: HCPCS

## 2025-04-01 PROCEDURE — 36415 COLL VENOUS BLD VENIPUNCTURE: CPT

## 2025-04-01 PROCEDURE — 85025 COMPLETE CBC W/AUTO DIFF WBC: CPT

## 2025-04-01 PROCEDURE — 80048 BASIC METABOLIC PNL TOTAL CA: CPT

## 2025-04-01 PROCEDURE — 1100000000 HC RM PRIVATE

## 2025-04-01 PROCEDURE — 83690 ASSAY OF LIPASE: CPT

## 2025-04-01 PROCEDURE — 6370000000 HC RX 637 (ALT 250 FOR IP): Performed by: INTERNAL MEDICINE

## 2025-04-01 PROCEDURE — 94761 N-INVAS EAR/PLS OXIMETRY MLT: CPT

## 2025-04-01 PROCEDURE — 83735 ASSAY OF MAGNESIUM: CPT

## 2025-04-01 PROCEDURE — 36430 TRANSFUSION BLD/BLD COMPNT: CPT

## 2025-04-01 PROCEDURE — 84100 ASSAY OF PHOSPHORUS: CPT

## 2025-04-01 PROCEDURE — 86140 C-REACTIVE PROTEIN: CPT

## 2025-04-01 PROCEDURE — 83036 HEMOGLOBIN GLYCOSYLATED A1C: CPT

## 2025-04-01 PROCEDURE — 84145 PROCALCITONIN (PCT): CPT

## 2025-04-01 RX ORDER — MAGNESIUM SULFATE IN WATER 40 MG/ML
2000 INJECTION, SOLUTION INTRAVENOUS
Status: DISPENSED | OUTPATIENT
Start: 2025-04-01 | End: 2025-04-01

## 2025-04-01 RX ORDER — METOPROLOL TARTRATE 25 MG/1
12.5 TABLET, FILM COATED ORAL 2 TIMES DAILY
COMMUNITY

## 2025-04-01 RX ORDER — OXYCODONE HYDROCHLORIDE 5 MG/1
5 TABLET ORAL EVERY 4 HOURS PRN
Refills: 0 | Status: DISCONTINUED | OUTPATIENT
Start: 2025-04-01 | End: 2025-04-04 | Stop reason: HOSPADM

## 2025-04-01 RX ORDER — FUROSEMIDE 40 MG/1
20 TABLET ORAL DAILY
Status: DISCONTINUED | OUTPATIENT
Start: 2025-04-02 | End: 2025-04-04 | Stop reason: HOSPADM

## 2025-04-01 RX ORDER — DEXTROSE MONOHYDRATE 100 MG/ML
INJECTION, SOLUTION INTRAVENOUS CONTINUOUS PRN
Status: DISCONTINUED | OUTPATIENT
Start: 2025-04-01 | End: 2025-04-04 | Stop reason: HOSPADM

## 2025-04-01 RX ORDER — SPIRONOLACTONE 25 MG/1
25 TABLET ORAL DAILY
Status: DISCONTINUED | OUTPATIENT
Start: 2025-04-02 | End: 2025-04-04 | Stop reason: HOSPADM

## 2025-04-01 RX ORDER — OMEPRAZOLE 20 MG/1
20 CAPSULE, DELAYED RELEASE ORAL DAILY
COMMUNITY

## 2025-04-01 RX ORDER — GLUCAGON 1 MG/ML
1 KIT INJECTION PRN
Status: DISCONTINUED | OUTPATIENT
Start: 2025-04-01 | End: 2025-04-04 | Stop reason: HOSPADM

## 2025-04-01 RX ORDER — CARVEDILOL 3.12 MG/1
3.12 TABLET ORAL 2 TIMES DAILY WITH MEALS
Status: DISCONTINUED | OUTPATIENT
Start: 2025-04-01 | End: 2025-04-04 | Stop reason: HOSPADM

## 2025-04-01 RX ORDER — IPRATROPIUM BROMIDE AND ALBUTEROL SULFATE 2.5; .5 MG/3ML; MG/3ML
1 SOLUTION RESPIRATORY (INHALATION) EVERY 4 HOURS PRN
Status: DISCONTINUED | OUTPATIENT
Start: 2025-04-01 | End: 2025-04-02

## 2025-04-01 RX ORDER — CALCIUM GLUCONATE 20 MG/ML
2000 INJECTION, SOLUTION INTRAVENOUS ONCE
Status: COMPLETED | OUTPATIENT
Start: 2025-04-01 | End: 2025-04-01

## 2025-04-01 RX ORDER — TAMSULOSIN HYDROCHLORIDE 0.4 MG/1
0.4 CAPSULE ORAL DAILY
COMMUNITY

## 2025-04-01 RX ORDER — HYDROXYZINE HYDROCHLORIDE 25 MG/ML
25 INJECTION, SOLUTION INTRAMUSCULAR EVERY 6 HOURS PRN
Status: DISCONTINUED | OUTPATIENT
Start: 2025-04-01 | End: 2025-04-04 | Stop reason: HOSPADM

## 2025-04-01 RX ORDER — HYDROMORPHONE HYDROCHLORIDE 1 MG/ML
0.5 INJECTION, SOLUTION INTRAMUSCULAR; INTRAVENOUS; SUBCUTANEOUS EVERY 4 HOURS PRN
Refills: 0 | Status: DISCONTINUED | OUTPATIENT
Start: 2025-04-01 | End: 2025-04-04 | Stop reason: HOSPADM

## 2025-04-01 RX ORDER — OXYCODONE HYDROCHLORIDE 5 MG/1
10 TABLET ORAL EVERY 6 HOURS PRN
COMMUNITY

## 2025-04-01 RX ORDER — OXYCODONE HYDROCHLORIDE 5 MG/1
10 TABLET ORAL EVERY 4 HOURS PRN
Refills: 0 | Status: DISCONTINUED | OUTPATIENT
Start: 2025-04-01 | End: 2025-04-04 | Stop reason: HOSPADM

## 2025-04-01 RX ORDER — SODIUM CHLORIDE 9 MG/ML
INJECTION, SOLUTION INTRAVENOUS PRN
Status: DISCONTINUED | OUTPATIENT
Start: 2025-04-01 | End: 2025-04-04 | Stop reason: HOSPADM

## 2025-04-01 RX ORDER — INSULIN GLARGINE 100 [IU]/ML
5 INJECTION, SOLUTION SUBCUTANEOUS 2 TIMES DAILY
Status: DISCONTINUED | OUTPATIENT
Start: 2025-04-01 | End: 2025-04-04 | Stop reason: HOSPADM

## 2025-04-01 RX ADMIN — INSULIN GLARGINE 5 UNITS: 100 INJECTION, SOLUTION SUBCUTANEOUS at 13:33

## 2025-04-01 RX ADMIN — QUETIAPINE FUMARATE 25 MG: 25 TABLET ORAL at 09:23

## 2025-04-01 RX ADMIN — SODIUM CHLORIDE, PRESERVATIVE FREE 1000 MG: 5 INJECTION INTRAVENOUS at 22:30

## 2025-04-01 RX ADMIN — OXYCODONE HYDROCHLORIDE 5 MG: 5 TABLET ORAL at 17:32

## 2025-04-01 RX ADMIN — PANCRELIPASE LIPASE, PANCRELIPASE PROTEASE, PANCRELIPASE AMYLASE 20000 UNITS: 20000; 63000; 84000 CAPSULE, DELAYED RELEASE ORAL at 09:42

## 2025-04-01 RX ADMIN — SODIUM CHLORIDE, PRESERVATIVE FREE 1000 MG: 5 INJECTION INTRAVENOUS at 16:01

## 2025-04-01 RX ADMIN — CARVEDILOL 3.12 MG: 3.12 TABLET, FILM COATED ORAL at 17:20

## 2025-04-01 RX ADMIN — PREGABALIN 75 MG: 50 CAPSULE ORAL at 20:30

## 2025-04-01 RX ADMIN — Medication 5 MG: at 01:08

## 2025-04-01 RX ADMIN — PANCRELIPASE LIPASE, PANCRELIPASE PROTEASE, PANCRELIPASE AMYLASE 5000 UNITS: 5000; 17000; 24000 CAPSULE, DELAYED RELEASE ORAL at 17:20

## 2025-04-01 RX ADMIN — DICLOFENAC SODIUM 4 G: 10 GEL TOPICAL at 22:30

## 2025-04-01 RX ADMIN — Medication 5 MG: at 20:04

## 2025-04-01 RX ADMIN — MAGNESIUM SULFATE HEPTAHYDRATE 2000 MG: 40 INJECTION, SOLUTION INTRAVENOUS at 13:39

## 2025-04-01 RX ADMIN — SODIUM CHLORIDE, PRESERVATIVE FREE 10 ML: 5 INJECTION INTRAVENOUS at 20:05

## 2025-04-01 RX ADMIN — SODIUM CHLORIDE, PRESERVATIVE FREE 20 MG: 5 INJECTION INTRAVENOUS at 09:24

## 2025-04-01 RX ADMIN — IPRATROPIUM BROMIDE AND ALBUTEROL SULFATE 1 DOSE: 2.5; .5 SOLUTION RESPIRATORY (INHALATION) at 19:45

## 2025-04-01 RX ADMIN — INSULIN GLARGINE 5 UNITS: 100 INJECTION, SOLUTION SUBCUTANEOUS at 20:30

## 2025-04-01 RX ADMIN — MAGNESIUM SULFATE HEPTAHYDRATE 2000 MG: 40 INJECTION, SOLUTION INTRAVENOUS at 04:36

## 2025-04-01 RX ADMIN — HYDROXYZINE HYDROCHLORIDE 25 MG: 25 INJECTION, SOLUTION INTRAMUSCULAR at 17:20

## 2025-04-01 RX ADMIN — QUETIAPINE FUMARATE 25 MG: 25 TABLET ORAL at 20:05

## 2025-04-01 RX ADMIN — CALCIUM GLUCONATE 2000 MG: 20 INJECTION, SOLUTION INTRAVENOUS at 09:39

## 2025-04-01 RX ADMIN — INSULIN LISPRO 2 UNITS: 100 INJECTION, SOLUTION INTRAVENOUS; SUBCUTANEOUS at 09:23

## 2025-04-01 RX ADMIN — INSULIN LISPRO 2 UNITS: 100 INJECTION, SOLUTION INTRAVENOUS; SUBCUTANEOUS at 13:32

## 2025-04-01 RX ADMIN — SODIUM CHLORIDE, PRESERVATIVE FREE 10 ML: 5 INJECTION INTRAVENOUS at 09:45

## 2025-04-01 RX ADMIN — PANCRELIPASE LIPASE, PANCRELIPASE PROTEASE, PANCRELIPASE AMYLASE 5000 UNITS: 5000; 17000; 24000 CAPSULE, DELAYED RELEASE ORAL at 09:43

## 2025-04-01 RX ADMIN — MAGNESIUM SULFATE HEPTAHYDRATE 2000 MG: 40 INJECTION, SOLUTION INTRAVENOUS at 16:04

## 2025-04-01 RX ADMIN — CARVEDILOL 3.12 MG: 3.12 TABLET, FILM COATED ORAL at 09:23

## 2025-04-01 RX ADMIN — PANCRELIPASE LIPASE, PANCRELIPASE PROTEASE, PANCRELIPASE AMYLASE 20000 UNITS: 20000; 63000; 84000 CAPSULE, DELAYED RELEASE ORAL at 17:20

## 2025-04-01 RX ADMIN — PANCRELIPASE LIPASE, PANCRELIPASE PROTEASE, PANCRELIPASE AMYLASE 5000 UNITS: 5000; 17000; 24000 CAPSULE, DELAYED RELEASE ORAL at 13:32

## 2025-04-01 RX ADMIN — PANCRELIPASE LIPASE, PANCRELIPASE PROTEASE, PANCRELIPASE AMYLASE 20000 UNITS: 20000; 63000; 84000 CAPSULE, DELAYED RELEASE ORAL at 13:32

## 2025-04-01 ASSESSMENT — PAIN SCALES - GENERAL
PAINLEVEL_OUTOF10: 0
PAINLEVEL_OUTOF10: 7
PAINLEVEL_OUTOF10: 4
PAINLEVEL_OUTOF10: 8

## 2025-04-01 ASSESSMENT — PAIN SCALES - WONG BAKER: WONGBAKER_NUMERICALRESPONSE: NO HURT

## 2025-04-01 ASSESSMENT — PAIN DESCRIPTION - LOCATION
LOCATION: BACK;RIB CAGE
LOCATION: RIB CAGE

## 2025-04-01 ASSESSMENT — PAIN DESCRIPTION - ORIENTATION: ORIENTATION: RIGHT;LOWER

## 2025-04-01 ASSESSMENT — PAIN DESCRIPTION - DESCRIPTORS: DESCRIPTORS: ACHING

## 2025-04-01 NOTE — CARE COORDINATION
CM reviewed Pt medicals, Pt lives alone but has a daughter who lives next door to him who assist Pt with whatever he might need.    Pt was IND with ADL.     Will need PT/OT eval/recommendations.     Per IDR    Off sedation, super confused, restless last night.   Sundowning??    Pt will transfer to the floor.

## 2025-04-02 ENCOUNTER — ANESTHESIA EVENT (OUTPATIENT)
Facility: HOSPITAL | Age: 70
DRG: 871 | End: 2025-04-02
Payer: MEDICARE

## 2025-04-02 ENCOUNTER — ANESTHESIA (OUTPATIENT)
Facility: HOSPITAL | Age: 70
DRG: 871 | End: 2025-04-02
Payer: MEDICARE

## 2025-04-02 LAB
ABO + RH BLD: NORMAL
ANION GAP SERPL CALC-SCNC: 4 MMOL/L (ref 2–12)
BASOPHILS # BLD: 0.1 K/UL (ref 0–0.1)
BASOPHILS NFR BLD: 1 % (ref 0–1)
BLD PROD TYP BPU: NORMAL
BLD PROD TYP BPU: NORMAL
BLOOD BANK BLOOD PRODUCT EXPIRATION DATE: NORMAL
BLOOD BANK BLOOD PRODUCT EXPIRATION DATE: NORMAL
BLOOD BANK DISPENSE STATUS: NORMAL
BLOOD BANK DISPENSE STATUS: NORMAL
BLOOD BANK ISBT PRODUCT BLOOD TYPE: 6200
BLOOD BANK ISBT PRODUCT BLOOD TYPE: 6200
BLOOD BANK PRODUCT CODE: NORMAL
BLOOD BANK PRODUCT CODE: NORMAL
BLOOD BANK UNIT TYPE AND RH: NORMAL
BLOOD BANK UNIT TYPE AND RH: NORMAL
BLOOD GROUP ANTIBODIES SERPL: NEGATIVE
BPU ID: NORMAL
BPU ID: NORMAL
BUN SERPL-MCNC: 30 MG/DL (ref 6–20)
BUN/CREAT SERPL: 29 (ref 12–20)
CA-I BLD-MCNC: 8.1 MG/DL (ref 8.5–10.1)
CHLORIDE SERPL-SCNC: 110 MMOL/L (ref 97–108)
CO2 SERPL-SCNC: 23 MMOL/L (ref 21–32)
CREAT SERPL-MCNC: 1.02 MG/DL (ref 0.7–1.3)
CROSSMATCH RESULT: NORMAL
CROSSMATCH RESULT: NORMAL
CRP SERPL-MCNC: 14.3 MG/DL (ref 0–0.3)
DIFFERENTIAL METHOD BLD: ABNORMAL
EOSINOPHIL # BLD: 0.1 K/UL (ref 0–0.4)
EOSINOPHIL NFR BLD: 1 % (ref 0–7)
ERYTHROCYTE [DISTWIDTH] IN BLOOD BY AUTOMATED COUNT: 17.6 % (ref 11.5–14.5)
EST. AVERAGE GLUCOSE BLD GHB EST-MCNC: 160 MG/DL
GLUCOSE BLD STRIP.AUTO-MCNC: 124 MG/DL (ref 65–100)
GLUCOSE BLD STRIP.AUTO-MCNC: 138 MG/DL (ref 65–100)
GLUCOSE BLD STRIP.AUTO-MCNC: 143 MG/DL (ref 65–100)
GLUCOSE BLD STRIP.AUTO-MCNC: 90 MG/DL (ref 65–100)
GLUCOSE SERPL-MCNC: 107 MG/DL (ref 65–100)
HBA1C MFR BLD: 7.2 % (ref 4–5.6)
HCT VFR BLD AUTO: 24.6 % (ref 36.6–50.3)
HGB BLD-MCNC: 8 G/DL (ref 12.1–17)
IMM GRANULOCYTES # BLD AUTO: 0 K/UL
IMM GRANULOCYTES NFR BLD AUTO: 0 %
LYMPHOCYTES # BLD: 0.61 K/UL (ref 0.8–3.5)
LYMPHOCYTES NFR BLD: 6 % (ref 12–49)
M PNEUMO IGM SER IA-ACNC: NONREACTIVE
MAGNESIUM SERPL-MCNC: 2.3 MG/DL (ref 1.6–2.4)
MCH RBC QN AUTO: 29.9 PG (ref 26–34)
MCHC RBC AUTO-ENTMCNC: 32.5 G/DL (ref 30–36.5)
MCV RBC AUTO: 91.8 FL (ref 80–99)
MONOCYTES # BLD: 0.31 K/UL (ref 0–1)
MONOCYTES NFR BLD: 3 % (ref 5–13)
MYELOCYTES NFR BLD MANUAL: 1 %
NEUTS SEG # BLD: 8.98 K/UL (ref 1.8–8)
NEUTS SEG NFR BLD: 88 % (ref 32–75)
NRBC # BLD: 0 K/UL (ref 0–0.01)
NRBC BLD-RTO: 0 PER 100 WBC
PERFORMED BY:: ABNORMAL
PERFORMED BY:: NORMAL
PHOSPHATE SERPL-MCNC: 4.3 MG/DL (ref 2.6–4.7)
PLATELET # BLD AUTO: 276 K/UL (ref 150–400)
PMV BLD AUTO: 10.7 FL (ref 8.9–12.9)
POTASSIUM SERPL-SCNC: 4.3 MMOL/L (ref 3.5–5.1)
PROCALCITONIN SERPL-MCNC: 11.25 NG/ML
RBC # BLD AUTO: 2.68 M/UL (ref 4.1–5.7)
RBC MORPH BLD: ABNORMAL
RBC MORPH BLD: ABNORMAL
SODIUM SERPL-SCNC: 137 MMOL/L (ref 136–145)
SPECIMEN EXP DATE BLD: NORMAL
TRANSFUSION STATUS PATIENT QL: NORMAL
TRANSFUSION STATUS PATIENT QL: NORMAL
UNIT DIVISION: 0
UNIT DIVISION: 0
UNIT ISSUE DATE/TIME: NORMAL
UNIT ISSUE DATE/TIME: NORMAL
WBC # BLD AUTO: 10.2 K/UL (ref 4.1–11.1)

## 2025-04-02 PROCEDURE — 6360000002 HC RX W HCPCS: Performed by: STUDENT IN AN ORGANIZED HEALTH CARE EDUCATION/TRAINING PROGRAM

## 2025-04-02 PROCEDURE — 7100000010 HC PHASE II RECOVERY - FIRST 15 MIN: Performed by: INTERNAL MEDICINE

## 2025-04-02 PROCEDURE — 99232 SBSQ HOSP IP/OBS MODERATE 35: CPT | Performed by: INTERNAL MEDICINE

## 2025-04-02 PROCEDURE — 82962 GLUCOSE BLOOD TEST: CPT

## 2025-04-02 PROCEDURE — 2500000003 HC RX 250 WO HCPCS: Performed by: STUDENT IN AN ORGANIZED HEALTH CARE EDUCATION/TRAINING PROGRAM

## 2025-04-02 PROCEDURE — 1100000000 HC RM PRIVATE

## 2025-04-02 PROCEDURE — 6360000002 HC RX W HCPCS

## 2025-04-02 PROCEDURE — 36415 COLL VENOUS BLD VENIPUNCTURE: CPT

## 2025-04-02 PROCEDURE — 2709999900 HC NON-CHARGEABLE SUPPLY: Performed by: INTERNAL MEDICINE

## 2025-04-02 PROCEDURE — 7100000011 HC PHASE II RECOVERY - ADDTL 15 MIN: Performed by: INTERNAL MEDICINE

## 2025-04-02 PROCEDURE — 84100 ASSAY OF PHOSPHORUS: CPT

## 2025-04-02 PROCEDURE — 80048 BASIC METABOLIC PNL TOTAL CA: CPT

## 2025-04-02 PROCEDURE — 6360000002 HC RX W HCPCS: Performed by: NURSE PRACTITIONER

## 2025-04-02 PROCEDURE — 0F7D8DZ DILATION OF PANCREATIC DUCT WITH INTRALUMINAL DEVICE, VIA NATURAL OR ARTIFICIAL OPENING ENDOSCOPIC: ICD-10-PCS | Performed by: INTERNAL MEDICINE

## 2025-04-02 PROCEDURE — 3700000000 HC ANESTHESIA ATTENDED CARE: Performed by: INTERNAL MEDICINE

## 2025-04-02 PROCEDURE — 6370000000 HC RX 637 (ALT 250 FOR IP)

## 2025-04-02 PROCEDURE — 2500000003 HC RX 250 WO HCPCS: Performed by: NURSE PRACTITIONER

## 2025-04-02 PROCEDURE — 84145 PROCALCITONIN (PCT): CPT

## 2025-04-02 PROCEDURE — 94761 N-INVAS EAR/PLS OXIMETRY MLT: CPT

## 2025-04-02 PROCEDURE — 6360000002 HC RX W HCPCS: Performed by: NURSE ANESTHETIST, CERTIFIED REGISTERED

## 2025-04-02 PROCEDURE — 0F9G8ZZ DRAINAGE OF PANCREAS, VIA NATURAL OR ARTIFICIAL OPENING ENDOSCOPIC: ICD-10-PCS | Performed by: INTERNAL MEDICINE

## 2025-04-02 PROCEDURE — 94640 AIRWAY INHALATION TREATMENT: CPT

## 2025-04-02 PROCEDURE — C1874 STENT, COATED/COV W/DEL SYS: HCPCS | Performed by: INTERNAL MEDICINE

## 2025-04-02 PROCEDURE — 6370000000 HC RX 637 (ALT 250 FOR IP): Performed by: STUDENT IN AN ORGANIZED HEALTH CARE EDUCATION/TRAINING PROGRAM

## 2025-04-02 PROCEDURE — 3600007502: Performed by: INTERNAL MEDICINE

## 2025-04-02 PROCEDURE — 0DB68ZX EXCISION OF STOMACH, VIA NATURAL OR ARTIFICIAL OPENING ENDOSCOPIC, DIAGNOSTIC: ICD-10-PCS | Performed by: INTERNAL MEDICINE

## 2025-04-02 PROCEDURE — 85025 COMPLETE CBC W/AUTO DIFF WBC: CPT

## 2025-04-02 PROCEDURE — 6370000000 HC RX 637 (ALT 250 FOR IP): Performed by: INTERNAL MEDICINE

## 2025-04-02 PROCEDURE — 6360000002 HC RX W HCPCS: Performed by: INTERNAL MEDICINE

## 2025-04-02 PROCEDURE — 3700000001 HC ADD 15 MINUTES (ANESTHESIA): Performed by: INTERNAL MEDICINE

## 2025-04-02 PROCEDURE — 3600007512: Performed by: INTERNAL MEDICINE

## 2025-04-02 PROCEDURE — 2700000000 HC OXYGEN THERAPY PER DAY

## 2025-04-02 PROCEDURE — 2500000003 HC RX 250 WO HCPCS: Performed by: NURSE ANESTHETIST, CERTIFIED REGISTERED

## 2025-04-02 PROCEDURE — 97161 PT EVAL LOW COMPLEX 20 MIN: CPT

## 2025-04-02 PROCEDURE — 83735 ASSAY OF MAGNESIUM: CPT

## 2025-04-02 PROCEDURE — 97530 THERAPEUTIC ACTIVITIES: CPT

## 2025-04-02 PROCEDURE — 86140 C-REACTIVE PROTEIN: CPT

## 2025-04-02 DEVICE — STENT AND ELECTROCAUTERY - ENHANCED DELIVERY SYSTEM
Type: IMPLANTABLE DEVICE | Status: FUNCTIONAL
Brand: AXIOS™

## 2025-04-02 RX ORDER — PROPOFOL 10 MG/ML
INJECTION, EMULSION INTRAVENOUS
Status: COMPLETED
Start: 2025-04-02 | End: 2025-04-02

## 2025-04-02 RX ORDER — IPRATROPIUM BROMIDE AND ALBUTEROL SULFATE 2.5; .5 MG/3ML; MG/3ML
1 SOLUTION RESPIRATORY (INHALATION)
Status: DISCONTINUED | OUTPATIENT
Start: 2025-04-02 | End: 2025-04-03

## 2025-04-02 RX ORDER — LIDOCAINE HYDROCHLORIDE 20 MG/ML
INJECTION, SOLUTION EPIDURAL; INFILTRATION; INTRACAUDAL; PERINEURAL
Status: DISCONTINUED | OUTPATIENT
Start: 2025-04-02 | End: 2025-04-02 | Stop reason: SDUPTHER

## 2025-04-02 RX ORDER — LIDOCAINE HYDROCHLORIDE 20 MG/ML
INJECTION, SOLUTION EPIDURAL; INFILTRATION; INTRACAUDAL; PERINEURAL
Status: COMPLETED
Start: 2025-04-02 | End: 2025-04-02

## 2025-04-02 RX ORDER — BUDESONIDE 0.5 MG/2ML
0.5 INHALANT ORAL
Status: DISCONTINUED | OUTPATIENT
Start: 2025-04-02 | End: 2025-04-04

## 2025-04-02 RX ORDER — EPHEDRINE SULFATE 50 MG/ML
INJECTION INTRAVENOUS
Status: COMPLETED
Start: 2025-04-02 | End: 2025-04-02

## 2025-04-02 RX ORDER — EPHEDRINE SULFATE 50 MG/ML
INJECTION INTRAVENOUS
Status: DISCONTINUED | OUTPATIENT
Start: 2025-04-02 | End: 2025-04-02 | Stop reason: SDUPTHER

## 2025-04-02 RX ADMIN — INSULIN GLARGINE 5 UNITS: 100 INJECTION, SOLUTION SUBCUTANEOUS at 21:03

## 2025-04-02 RX ADMIN — FUROSEMIDE 20 MG: 40 TABLET ORAL at 08:24

## 2025-04-02 RX ADMIN — SODIUM CHLORIDE, PRESERVATIVE FREE 1000 MG: 5 INJECTION INTRAVENOUS at 05:52

## 2025-04-02 RX ADMIN — EPHEDRINE SULFATE 20 MG: 50 INJECTION INTRAVENOUS at 12:44

## 2025-04-02 RX ADMIN — HYDROMORPHONE HYDROCHLORIDE 0.5 MG: 1 INJECTION, SOLUTION INTRAMUSCULAR; INTRAVENOUS; SUBCUTANEOUS at 21:03

## 2025-04-02 RX ADMIN — PROPOFOL 50 MG: 10 INJECTION, EMULSION INTRAVENOUS at 12:58

## 2025-04-02 RX ADMIN — BUDESONIDE INHALATION 500 MCG: 0.5 SUSPENSION RESPIRATORY (INHALATION) at 19:17

## 2025-04-02 RX ADMIN — SODIUM CHLORIDE, PRESERVATIVE FREE 10 ML: 5 INJECTION INTRAVENOUS at 21:06

## 2025-04-02 RX ADMIN — QUETIAPINE FUMARATE 25 MG: 25 TABLET ORAL at 21:02

## 2025-04-02 RX ADMIN — OXYCODONE HYDROCHLORIDE 10 MG: 5 TABLET ORAL at 08:24

## 2025-04-02 RX ADMIN — PREGABALIN 75 MG: 50 CAPSULE ORAL at 08:22

## 2025-04-02 RX ADMIN — PROPOFOL 50 MG: 10 INJECTION, EMULSION INTRAVENOUS at 12:28

## 2025-04-02 RX ADMIN — PREGABALIN 75 MG: 50 CAPSULE ORAL at 21:02

## 2025-04-02 RX ADMIN — Medication 5 MG: at 21:02

## 2025-04-02 RX ADMIN — IPRATROPIUM BROMIDE AND ALBUTEROL SULFATE 1 DOSE: 2.5; .5 SOLUTION RESPIRATORY (INHALATION) at 11:10

## 2025-04-02 RX ADMIN — DICLOFENAC SODIUM 4 G: 10 GEL TOPICAL at 23:04

## 2025-04-02 RX ADMIN — IPRATROPIUM BROMIDE AND ALBUTEROL SULFATE 1 DOSE: 2.5; .5 SOLUTION RESPIRATORY (INHALATION) at 19:17

## 2025-04-02 RX ADMIN — PANCRELIPASE LIPASE, PANCRELIPASE PROTEASE, PANCRELIPASE AMYLASE 5000 UNITS: 5000; 17000; 24000 CAPSULE, DELAYED RELEASE ORAL at 17:24

## 2025-04-02 RX ADMIN — CARVEDILOL 3.12 MG: 3.12 TABLET, FILM COATED ORAL at 08:23

## 2025-04-02 RX ADMIN — PROPOFOL 50 MG: 10 INJECTION, EMULSION INTRAVENOUS at 12:31

## 2025-04-02 RX ADMIN — SODIUM CHLORIDE, PRESERVATIVE FREE 20 MG: 5 INJECTION INTRAVENOUS at 08:26

## 2025-04-02 RX ADMIN — SODIUM CHLORIDE, PRESERVATIVE FREE 10 ML: 5 INJECTION INTRAVENOUS at 08:26

## 2025-04-02 RX ADMIN — DICLOFENAC SODIUM 4 G: 10 GEL TOPICAL at 08:42

## 2025-04-02 RX ADMIN — SODIUM CHLORIDE, PRESERVATIVE FREE 1000 MG: 5 INJECTION INTRAVENOUS at 14:48

## 2025-04-02 RX ADMIN — PANCRELIPASE LIPASE, PANCRELIPASE PROTEASE, PANCRELIPASE AMYLASE 20000 UNITS: 20000; 63000; 84000 CAPSULE, DELAYED RELEASE ORAL at 17:24

## 2025-04-02 RX ADMIN — CARVEDILOL 3.12 MG: 3.12 TABLET, FILM COATED ORAL at 17:24

## 2025-04-02 RX ADMIN — PROPOFOL 50 MG: 10 INJECTION, EMULSION INTRAVENOUS at 12:46

## 2025-04-02 RX ADMIN — IPRATROPIUM BROMIDE AND ALBUTEROL SULFATE 1 DOSE: 2.5; .5 SOLUTION RESPIRATORY (INHALATION) at 23:14

## 2025-04-02 RX ADMIN — SPIRONOLACTONE 25 MG: 25 TABLET ORAL at 08:23

## 2025-04-02 RX ADMIN — LIDOCAINE HYDROCHLORIDE 100 MG: 20 SOLUTION INTRAVENOUS at 12:28

## 2025-04-02 RX ADMIN — EPHEDRINE SULFATE 20 MG: 50 INJECTION INTRAVENOUS at 12:40

## 2025-04-02 RX ADMIN — SODIUM CHLORIDE, PRESERVATIVE FREE 1000 MG: 5 INJECTION INTRAVENOUS at 22:17

## 2025-04-02 RX ADMIN — BUDESONIDE INHALATION 500 MCG: 0.5 SUSPENSION RESPIRATORY (INHALATION) at 11:10

## 2025-04-02 RX ADMIN — OXYCODONE HYDROCHLORIDE 10 MG: 5 TABLET ORAL at 01:38

## 2025-04-02 RX ADMIN — QUETIAPINE FUMARATE 25 MG: 25 TABLET ORAL at 08:24

## 2025-04-02 RX ADMIN — IPRATROPIUM BROMIDE AND ALBUTEROL SULFATE 1 DOSE: 2.5; .5 SOLUTION RESPIRATORY (INHALATION) at 17:25

## 2025-04-02 RX ADMIN — EPHEDRINE SULFATE 10 MG: 50 INJECTION INTRAVENOUS at 12:51

## 2025-04-02 ASSESSMENT — PAIN DESCRIPTION - LOCATION
LOCATION: GENERALIZED
LOCATION: GENERALIZED
LOCATION: BACK
LOCATION: RIB CAGE
LOCATION: BACK

## 2025-04-02 ASSESSMENT — PAIN DESCRIPTION - DESCRIPTORS
DESCRIPTORS: ACHING
DESCRIPTORS: ACHING
DESCRIPTORS: SHARP;STABBING
DESCRIPTORS: SHARP;STABBING
DESCRIPTORS: SHARP;SORE

## 2025-04-02 ASSESSMENT — PAIN SCALES - GENERAL
PAINLEVEL_OUTOF10: 7
PAINLEVEL_OUTOF10: 0
PAINLEVEL_OUTOF10: 7
PAINLEVEL_OUTOF10: 7
PAINLEVEL_OUTOF10: 0
PAINLEVEL_OUTOF10: 2
PAINLEVEL_OUTOF10: 0
PAINLEVEL_OUTOF10: 2
PAINLEVEL_OUTOF10: 0
PAINLEVEL_OUTOF10: 5
PAINLEVEL_OUTOF10: 0
PAINLEVEL_OUTOF10: 10
PAINLEVEL_OUTOF10: 0
PAINLEVEL_OUTOF10: 6

## 2025-04-02 ASSESSMENT — PAIN DESCRIPTION - ORIENTATION
ORIENTATION: RIGHT
ORIENTATION: RIGHT;LEFT
ORIENTATION: POSTERIOR
ORIENTATION: RIGHT;LEFT
ORIENTATION: RIGHT;LEFT

## 2025-04-02 ASSESSMENT — PAIN SCALES - WONG BAKER
WONGBAKER_NUMERICALRESPONSE: NO HURT
WONGBAKER_NUMERICALRESPONSE: NO HURT

## 2025-04-02 ASSESSMENT — PAIN - FUNCTIONAL ASSESSMENT: PAIN_FUNCTIONAL_ASSESSMENT: PREVENTS OR INTERFERES SOME ACTIVE ACTIVITIES AND ADLS

## 2025-04-02 NOTE — ANESTHESIA PRE PROCEDURE
Value Date/Time    HEPCAB 0.14 03/30/2025 02:00 AM       COVID-19 Screening (If Applicable): No results found for: \"COVID19\"        Anesthesia Evaluation  Patient summary reviewed and Nursing notes reviewed   no history of anesthetic complications:   Airway: Mallampati: II  TM distance: >3 FB   Neck ROM: full  Mouth opening: > = 3 FB   Dental: normal exam         Pulmonary:normal exam  breath sounds clear to auscultation  (+)  COPD:                                     Cardiovascular:    (+) hypertension:        Rhythm: regular  Rate: normal                 ROS comment: Echo:  ·  Left Ventricle: Normal left ventricular systolic function with a visually estimated EF of 55 - 60%. Left ventricle size is normal. Increased wall thickness. EF BP is 54%. Normal wall motion. Diastolic dysfunction present with normal LV EF.  ·  Tricuspid Valve: Mild regurgitation. The estimated RVSP is 36 mmHg.  ·  Image quality is adequate.     Neuro/Psych:   Negative Neuro/Psych ROS              GI/Hepatic/Renal:   (+) liver disease:          Endo/Other:    (+) Diabetes.                 Abdominal:              PE comment: Deferred.   Vascular: negative vascular ROS.         Other Findings:             Anesthesia Plan      MAC and TIVA     ASA 4     (Standard ASA monitors: continuous EKG, BP, HR, pulse oximeter, and capnography.)  Induction: intravenous.    MIPS: Prophylactic antiemetics administered.  Anesthetic plan and risks discussed with patient (and family, if present.).                        Rick Gonzalez MD   4/2/2025

## 2025-04-02 NOTE — CARE COORDINATION
0735: Chart reviewed.    Per notes; patient on IV ABX and receiving pain management followed via dietitian, GI and ID.    Patient NPO for EGD/EUS today.    PT/OT evals pending.    Profile built in [facility].    Prior to admission, patient lived alone with his daughter next door.    CM will continue to follow patient and recs of medical team.    1155: Verbal PT recs: SNF.    CM attempted to meet with patient: off unit.    1500: CM met with patient and Trish at bedside and daughterJyoti on phone to discuss discharge plan.     Choice letter received for [facility], placed on chart and referral sent.

## 2025-04-02 NOTE — ANESTHESIA POSTPROCEDURE EVALUATION
Department of Anesthesiology  Postprocedure Note    Patient: Santos Laughlin  MRN: [id number]  YOB: 1955  Date of evaluation: 4/2/2025    Procedure Summary       Date: 04/02/25 Room / Location: [facility] 04 / [facility] ENDOSCOPY    Anesthesia Start: 1221 Anesthesia Stop: 1302    Procedures:       ESOPHAGOGASTRODUODENOSCOPY (Upper GI Region)      ENDOSCOPIC ULTRASOUND (Upper GI Region) Diagnosis:       Pancreatic pseudocyst      (Pancreatic pseudocyst [K86.3])    Surgeons: Sravan Starkey MD Responsible Provider: Rick Gonzalez MD    Anesthesia Type: MAC ASA Status: 4            Anesthesia Type: MAC    Bhaskar Phase I:      Bhaskar Phase II:      Anesthesia Post Evaluation    Patient location during evaluation: bedside (Endoscopy Unit)  Patient participation: complete - patient participated  Level of consciousness: sleepy but conscious  Pain score: 0  Airway patency: patent  Nausea & Vomiting: no nausea and no vomiting  Cardiovascular status: hemodynamically stable  Respiratory status: acceptable  Hydration status: stable  Comments: This patient remained on the stretcher.  The patient was handed off to the endoscopy nursing team.  All questions regarding pre-, intra-, and postoperative care were answered.  Multimodal analgesia pain management approach    No notable events documented.

## 2025-04-02 NOTE — PROCEDURES
PROCEDURE NOTE  Date: 4/2/2025   Name: Santos Laughlin  YOB: 1955    Procedures      Please refer to the chart review-notes-scanned EGD and EUS report for findings and recommendations    Large pseudocyst noted, axios stent was placed, 200 mL puslike fluid drained out into the stomach.      Continue antibiotic treatment  Clear liquid diet today, low-fat diet afterwards

## 2025-04-03 LAB
ANION GAP SERPL CALC-SCNC: 2 MMOL/L (ref 2–12)
BACTERIA SPEC CULT: NORMAL
BACTERIA SPEC CULT: NORMAL
BASOPHILS # BLD: 0.03 K/UL (ref 0–0.1)
BASOPHILS NFR BLD: 0.2 % (ref 0–1)
BUN SERPL-MCNC: 28 MG/DL (ref 6–20)
BUN/CREAT SERPL: 38 (ref 12–20)
CA-I BLD-MCNC: 8.5 MG/DL (ref 8.5–10.1)
CHLORIDE SERPL-SCNC: 110 MMOL/L (ref 97–108)
CO2 SERPL-SCNC: 26 MMOL/L (ref 21–32)
CREAT SERPL-MCNC: 0.74 MG/DL (ref 0.7–1.3)
CRP SERPL-MCNC: 12 MG/DL (ref 0–0.3)
DIFFERENTIAL METHOD BLD: ABNORMAL
EOSINOPHIL # BLD: 0.08 K/UL (ref 0–0.4)
EOSINOPHIL NFR BLD: 0.6 % (ref 0–7)
ERYTHROCYTE [DISTWIDTH] IN BLOOD BY AUTOMATED COUNT: 17.9 % (ref 11.5–14.5)
GLUCOSE BLD STRIP.AUTO-MCNC: 114 MG/DL (ref 65–100)
GLUCOSE BLD STRIP.AUTO-MCNC: 137 MG/DL (ref 65–100)
GLUCOSE BLD STRIP.AUTO-MCNC: 271 MG/DL (ref 65–100)
GLUCOSE SERPL-MCNC: 104 MG/DL (ref 65–100)
HCT VFR BLD AUTO: 25.4 % (ref 36.6–50.3)
HGB BLD-MCNC: 8 G/DL (ref 12.1–17)
IMM GRANULOCYTES # BLD AUTO: 0.17 K/UL (ref 0–0.04)
IMM GRANULOCYTES NFR BLD AUTO: 1.4 % (ref 0–0.5)
LYMPHOCYTES # BLD: 0.78 K/UL (ref 0.8–3.5)
LYMPHOCYTES NFR BLD: 6.2 % (ref 12–49)
Lab: NORMAL
Lab: NORMAL
MAGNESIUM SERPL-MCNC: 2 MG/DL (ref 1.6–2.4)
MCH RBC QN AUTO: 29.5 PG (ref 26–34)
MCHC RBC AUTO-ENTMCNC: 31.5 G/DL (ref 30–36.5)
MCV RBC AUTO: 93.7 FL (ref 80–99)
MONOCYTES # BLD: 0.76 K/UL (ref 0–1)
MONOCYTES NFR BLD: 6.1 % (ref 5–13)
NEUTS SEG # BLD: 10.71 K/UL (ref 1.8–8)
NEUTS SEG NFR BLD: 85.5 % (ref 32–75)
NRBC # BLD: 0 K/UL (ref 0–0.01)
NRBC BLD-RTO: 0 PER 100 WBC
PERFORMED BY:: ABNORMAL
PHOSPHATE SERPL-MCNC: 3.3 MG/DL (ref 2.6–4.7)
PLATELET # BLD AUTO: 292 K/UL (ref 150–400)
PMV BLD AUTO: 10.7 FL (ref 8.9–12.9)
POTASSIUM SERPL-SCNC: 4.4 MMOL/L (ref 3.5–5.1)
PROCALCITONIN SERPL-MCNC: 6.06 NG/ML
RBC # BLD AUTO: 2.71 M/UL (ref 4.1–5.7)
SODIUM SERPL-SCNC: 138 MMOL/L (ref 136–145)
WBC # BLD AUTO: 12.5 K/UL (ref 4.1–11.1)

## 2025-04-03 PROCEDURE — 83735 ASSAY OF MAGNESIUM: CPT

## 2025-04-03 PROCEDURE — 2500000003 HC RX 250 WO HCPCS: Performed by: INTERNAL MEDICINE

## 2025-04-03 PROCEDURE — 6360000002 HC RX W HCPCS

## 2025-04-03 PROCEDURE — 6360000002 HC RX W HCPCS: Performed by: STUDENT IN AN ORGANIZED HEALTH CARE EDUCATION/TRAINING PROGRAM

## 2025-04-03 PROCEDURE — 84145 PROCALCITONIN (PCT): CPT

## 2025-04-03 PROCEDURE — 82962 GLUCOSE BLOOD TEST: CPT

## 2025-04-03 PROCEDURE — 86140 C-REACTIVE PROTEIN: CPT

## 2025-04-03 PROCEDURE — 1100000000 HC RM PRIVATE

## 2025-04-03 PROCEDURE — 80048 BASIC METABOLIC PNL TOTAL CA: CPT

## 2025-04-03 PROCEDURE — 36415 COLL VENOUS BLD VENIPUNCTURE: CPT

## 2025-04-03 PROCEDURE — 99232 SBSQ HOSP IP/OBS MODERATE 35: CPT | Performed by: INTERNAL MEDICINE

## 2025-04-03 PROCEDURE — 2500000003 HC RX 250 WO HCPCS: Performed by: STUDENT IN AN ORGANIZED HEALTH CARE EDUCATION/TRAINING PROGRAM

## 2025-04-03 PROCEDURE — 84100 ASSAY OF PHOSPHORUS: CPT

## 2025-04-03 PROCEDURE — 6370000000 HC RX 637 (ALT 250 FOR IP)

## 2025-04-03 PROCEDURE — 6360000002 HC RX W HCPCS: Performed by: NURSE PRACTITIONER

## 2025-04-03 PROCEDURE — 85025 COMPLETE CBC W/AUTO DIFF WBC: CPT

## 2025-04-03 PROCEDURE — 2700000000 HC OXYGEN THERAPY PER DAY

## 2025-04-03 PROCEDURE — 97530 THERAPEUTIC ACTIVITIES: CPT

## 2025-04-03 PROCEDURE — 94761 N-INVAS EAR/PLS OXIMETRY MLT: CPT

## 2025-04-03 PROCEDURE — 6360000002 HC RX W HCPCS: Performed by: INTERNAL MEDICINE

## 2025-04-03 PROCEDURE — 2500000003 HC RX 250 WO HCPCS: Performed by: NURSE PRACTITIONER

## 2025-04-03 PROCEDURE — 6370000000 HC RX 637 (ALT 250 FOR IP): Performed by: STUDENT IN AN ORGANIZED HEALTH CARE EDUCATION/TRAINING PROGRAM

## 2025-04-03 PROCEDURE — 6370000000 HC RX 637 (ALT 250 FOR IP): Performed by: INTERNAL MEDICINE

## 2025-04-03 PROCEDURE — 94640 AIRWAY INHALATION TREATMENT: CPT

## 2025-04-03 PROCEDURE — 97165 OT EVAL LOW COMPLEX 30 MIN: CPT

## 2025-04-03 RX ORDER — GUAIFENESIN 600 MG/1
600 TABLET, EXTENDED RELEASE ORAL 2 TIMES DAILY
Status: DISCONTINUED | OUTPATIENT
Start: 2025-04-03 | End: 2025-04-04 | Stop reason: HOSPADM

## 2025-04-03 RX ORDER — IPRATROPIUM BROMIDE AND ALBUTEROL SULFATE 2.5; .5 MG/3ML; MG/3ML
1 SOLUTION RESPIRATORY (INHALATION)
Status: DISCONTINUED | OUTPATIENT
Start: 2025-04-03 | End: 2025-04-04 | Stop reason: HOSPADM

## 2025-04-03 RX ORDER — ACETYLCYSTEINE 200 MG/ML
600 SOLUTION ORAL; RESPIRATORY (INHALATION)
Status: DISCONTINUED | OUTPATIENT
Start: 2025-04-03 | End: 2025-04-04 | Stop reason: HOSPADM

## 2025-04-03 RX ORDER — ACETYLCYSTEINE 200 MG/ML
600 SOLUTION ORAL; RESPIRATORY (INHALATION)
Status: DISCONTINUED | OUTPATIENT
Start: 2025-04-03 | End: 2025-04-03

## 2025-04-03 RX ADMIN — GUAIFENESIN 600 MG: 600 TABLET, EXTENDED RELEASE ORAL at 10:18

## 2025-04-03 RX ADMIN — SODIUM CHLORIDE, PRESERVATIVE FREE 1000 MG: 5 INJECTION INTRAVENOUS at 14:24

## 2025-04-03 RX ADMIN — SODIUM CHLORIDE, PRESERVATIVE FREE 1000 MG: 5 INJECTION INTRAVENOUS at 06:01

## 2025-04-03 RX ADMIN — ACETYLCYSTEINE 600 MG: 200 SOLUTION ORAL; RESPIRATORY (INHALATION) at 11:12

## 2025-04-03 RX ADMIN — CARVEDILOL 3.12 MG: 3.12 TABLET, FILM COATED ORAL at 16:45

## 2025-04-03 RX ADMIN — INSULIN GLARGINE 5 UNITS: 100 INJECTION, SOLUTION SUBCUTANEOUS at 21:41

## 2025-04-03 RX ADMIN — CARVEDILOL 3.12 MG: 3.12 TABLET, FILM COATED ORAL at 10:19

## 2025-04-03 RX ADMIN — PANCRELIPASE LIPASE, PANCRELIPASE PROTEASE, PANCRELIPASE AMYLASE 20000 UNITS: 20000; 63000; 84000 CAPSULE, DELAYED RELEASE ORAL at 13:04

## 2025-04-03 RX ADMIN — FUROSEMIDE 20 MG: 40 TABLET ORAL at 10:19

## 2025-04-03 RX ADMIN — INSULIN GLARGINE 5 UNITS: 100 INJECTION, SOLUTION SUBCUTANEOUS at 10:20

## 2025-04-03 RX ADMIN — PANCRELIPASE LIPASE, PANCRELIPASE PROTEASE, PANCRELIPASE AMYLASE 5000 UNITS: 5000; 17000; 24000 CAPSULE, DELAYED RELEASE ORAL at 13:04

## 2025-04-03 RX ADMIN — ENOXAPARIN SODIUM 30 MG: 100 INJECTION SUBCUTANEOUS at 21:40

## 2025-04-03 RX ADMIN — BUDESONIDE INHALATION 500 MCG: 0.5 SUSPENSION RESPIRATORY (INHALATION) at 19:15

## 2025-04-03 RX ADMIN — SPIRONOLACTONE 25 MG: 25 TABLET ORAL at 10:19

## 2025-04-03 RX ADMIN — IPRATROPIUM BROMIDE AND ALBUTEROL SULFATE 1 DOSE: 2.5; .5 SOLUTION RESPIRATORY (INHALATION) at 19:15

## 2025-04-03 RX ADMIN — QUETIAPINE FUMARATE 25 MG: 25 TABLET ORAL at 21:41

## 2025-04-03 RX ADMIN — BUDESONIDE INHALATION 500 MCG: 0.5 SUSPENSION RESPIRATORY (INHALATION) at 07:47

## 2025-04-03 RX ADMIN — INSULIN LISPRO 4 UNITS: 100 INJECTION, SOLUTION INTRAVENOUS; SUBCUTANEOUS at 16:45

## 2025-04-03 RX ADMIN — PREGABALIN 75 MG: 50 CAPSULE ORAL at 10:18

## 2025-04-03 RX ADMIN — Medication 5 MG: at 21:41

## 2025-04-03 RX ADMIN — DICLOFENAC SODIUM 4 G: 10 GEL TOPICAL at 22:03

## 2025-04-03 RX ADMIN — IPRATROPIUM BROMIDE AND ALBUTEROL SULFATE 1 DOSE: 2.5; .5 SOLUTION RESPIRATORY (INHALATION) at 07:47

## 2025-04-03 RX ADMIN — SODIUM CHLORIDE, PRESERVATIVE FREE 20 MG: 5 INJECTION INTRAVENOUS at 10:20

## 2025-04-03 RX ADMIN — HYDROMORPHONE HYDROCHLORIDE 0.5 MG: 1 INJECTION, SOLUTION INTRAMUSCULAR; INTRAVENOUS; SUBCUTANEOUS at 06:22

## 2025-04-03 RX ADMIN — SODIUM CHLORIDE, PRESERVATIVE FREE 10 ML: 5 INJECTION INTRAVENOUS at 10:20

## 2025-04-03 RX ADMIN — PANCRELIPASE LIPASE, PANCRELIPASE PROTEASE, PANCRELIPASE AMYLASE 20000 UNITS: 20000; 63000; 84000 CAPSULE, DELAYED RELEASE ORAL at 10:38

## 2025-04-03 RX ADMIN — HYDROMORPHONE HYDROCHLORIDE 0.5 MG: 1 INJECTION, SOLUTION INTRAMUSCULAR; INTRAVENOUS; SUBCUTANEOUS at 21:40

## 2025-04-03 RX ADMIN — QUETIAPINE FUMARATE 25 MG: 25 TABLET ORAL at 10:19

## 2025-04-03 RX ADMIN — GUAIFENESIN 600 MG: 600 TABLET, EXTENDED RELEASE ORAL at 21:41

## 2025-04-03 RX ADMIN — PANCRELIPASE LIPASE, PANCRELIPASE PROTEASE, PANCRELIPASE AMYLASE 5000 UNITS: 5000; 17000; 24000 CAPSULE, DELAYED RELEASE ORAL at 10:19

## 2025-04-03 RX ADMIN — PANCRELIPASE LIPASE, PANCRELIPASE PROTEASE, PANCRELIPASE AMYLASE 5000 UNITS: 5000; 17000; 24000 CAPSULE, DELAYED RELEASE ORAL at 16:45

## 2025-04-03 RX ADMIN — PANCRELIPASE LIPASE, PANCRELIPASE PROTEASE, PANCRELIPASE AMYLASE 20000 UNITS: 20000; 63000; 84000 CAPSULE, DELAYED RELEASE ORAL at 16:45

## 2025-04-03 RX ADMIN — SODIUM CHLORIDE, PRESERVATIVE FREE 10 ML: 5 INJECTION INTRAVENOUS at 22:08

## 2025-04-03 RX ADMIN — IPRATROPIUM BROMIDE AND ALBUTEROL SULFATE 1 DOSE: 2.5; .5 SOLUTION RESPIRATORY (INHALATION) at 04:13

## 2025-04-03 RX ADMIN — ACETYLCYSTEINE 600 MG: 200 INHALANT RESPIRATORY (INHALATION) at 19:15

## 2025-04-03 RX ADMIN — SODIUM CHLORIDE, PRESERVATIVE FREE 1000 MG: 5 INJECTION INTRAVENOUS at 21:56

## 2025-04-03 RX ADMIN — HYDROMORPHONE HYDROCHLORIDE 0.5 MG: 1 INJECTION, SOLUTION INTRAMUSCULAR; INTRAVENOUS; SUBCUTANEOUS at 16:51

## 2025-04-03 RX ADMIN — DICLOFENAC SODIUM 4 G: 10 GEL TOPICAL at 10:19

## 2025-04-03 RX ADMIN — PREGABALIN 75 MG: 50 CAPSULE ORAL at 21:41

## 2025-04-03 RX ADMIN — IPRATROPIUM BROMIDE AND ALBUTEROL SULFATE 1 DOSE: 2.5; .5 SOLUTION RESPIRATORY (INHALATION) at 11:12

## 2025-04-03 ASSESSMENT — PAIN SCALES - GENERAL
PAINLEVEL_OUTOF10: 3
PAINLEVEL_OUTOF10: 9
PAINLEVEL_OUTOF10: 6
PAINLEVEL_OUTOF10: 3
PAINLEVEL_OUTOF10: 9
PAINLEVEL_OUTOF10: 9

## 2025-04-03 ASSESSMENT — PAIN DESCRIPTION - ORIENTATION
ORIENTATION: RIGHT
ORIENTATION: RIGHT;POSTERIOR
ORIENTATION: LOWER;RIGHT

## 2025-04-03 ASSESSMENT — PAIN DESCRIPTION - LOCATION
LOCATION: BACK;ABDOMEN
LOCATION: HIP;BACK
LOCATION: BACK;ABDOMEN

## 2025-04-03 ASSESSMENT — PAIN - FUNCTIONAL ASSESSMENT
PAIN_FUNCTIONAL_ASSESSMENT: PREVENTS OR INTERFERES SOME ACTIVE ACTIVITIES AND ADLS
PAIN_FUNCTIONAL_ASSESSMENT: PREVENTS OR INTERFERES SOME ACTIVE ACTIVITIES AND ADLS

## 2025-04-03 ASSESSMENT — PAIN DESCRIPTION - DESCRIPTORS
DESCRIPTORS: ACHING

## 2025-04-03 NOTE — CARE COORDINATION
Patient has been accepted to [facility]. He does not need insurance auth. Anticipate discharge tomorrow, 4/4 once patient has midline placed for two more weeks of IV merrem. Notified [facility] of plan.

## 2025-04-04 VITALS
RESPIRATION RATE: 18 BRPM | DIASTOLIC BLOOD PRESSURE: 73 MMHG | TEMPERATURE: 99.5 F | OXYGEN SATURATION: 92 % | HEART RATE: 85 BPM | HEIGHT: 69 IN | WEIGHT: 252.87 LBS | SYSTOLIC BLOOD PRESSURE: 121 MMHG | BODY MASS INDEX: 37.45 KG/M2

## 2025-04-04 LAB
ANION GAP SERPL CALC-SCNC: 0 MMOL/L (ref 2–12)
BASOPHILS # BLD: 0.05 K/UL (ref 0–0.1)
BASOPHILS NFR BLD: 0.3 % (ref 0–1)
BUN SERPL-MCNC: 22 MG/DL (ref 6–20)
BUN/CREAT SERPL: 34 (ref 12–20)
CA-I BLD-MCNC: 8.7 MG/DL (ref 8.5–10.1)
CHLORIDE SERPL-SCNC: 109 MMOL/L (ref 97–108)
CO2 SERPL-SCNC: 27 MMOL/L (ref 21–32)
CREAT SERPL-MCNC: 0.65 MG/DL (ref 0.7–1.3)
DIFFERENTIAL METHOD BLD: ABNORMAL
EOSINOPHIL # BLD: 0.11 K/UL (ref 0–0.4)
EOSINOPHIL NFR BLD: 0.6 % (ref 0–7)
ERYTHROCYTE [DISTWIDTH] IN BLOOD BY AUTOMATED COUNT: 17.5 % (ref 11.5–14.5)
GLUCOSE BLD STRIP.AUTO-MCNC: 181 MG/DL (ref 65–100)
GLUCOSE BLD STRIP.AUTO-MCNC: 216 MG/DL (ref 65–100)
GLUCOSE BLD STRIP.AUTO-MCNC: 96 MG/DL (ref 65–100)
GLUCOSE SERPL-MCNC: 92 MG/DL (ref 65–100)
HCT VFR BLD AUTO: 25.1 % (ref 36.6–50.3)
HGB BLD-MCNC: 7.8 G/DL (ref 12.1–17)
IMM GRANULOCYTES # BLD AUTO: 0.3 K/UL (ref 0–0.04)
IMM GRANULOCYTES NFR BLD AUTO: 1.6 % (ref 0–0.5)
LYMPHOCYTES # BLD: 0.84 K/UL (ref 0.8–3.5)
LYMPHOCYTES NFR BLD: 4.5 % (ref 12–49)
MAGNESIUM SERPL-MCNC: 1.6 MG/DL (ref 1.6–2.4)
MCH RBC QN AUTO: 29.7 PG (ref 26–34)
MCHC RBC AUTO-ENTMCNC: 31.1 G/DL (ref 30–36.5)
MCV RBC AUTO: 95.4 FL (ref 80–99)
MONOCYTES # BLD: 1.17 K/UL (ref 0–1)
MONOCYTES NFR BLD: 6.2 % (ref 5–13)
NEUTS SEG # BLD: 16.27 K/UL (ref 1.8–8)
NEUTS SEG NFR BLD: 86.8 % (ref 32–75)
NRBC # BLD: 0 K/UL (ref 0–0.01)
NRBC BLD-RTO: 0 PER 100 WBC
PERFORMED BY:: ABNORMAL
PERFORMED BY:: ABNORMAL
PERFORMED BY:: NORMAL
PHOSPHATE SERPL-MCNC: 3.3 MG/DL (ref 2.6–4.7)
PLATELET # BLD AUTO: 300 K/UL (ref 150–400)
PMV BLD AUTO: 10.5 FL (ref 8.9–12.9)
POTASSIUM SERPL-SCNC: 4.5 MMOL/L (ref 3.5–5.1)
RBC # BLD AUTO: 2.63 M/UL (ref 4.1–5.7)
SODIUM SERPL-SCNC: 136 MMOL/L (ref 136–145)
WBC # BLD AUTO: 18.7 K/UL (ref 4.1–11.1)

## 2025-04-04 PROCEDURE — 6360000002 HC RX W HCPCS: Performed by: STUDENT IN AN ORGANIZED HEALTH CARE EDUCATION/TRAINING PROGRAM

## 2025-04-04 PROCEDURE — 83735 ASSAY OF MAGNESIUM: CPT

## 2025-04-04 PROCEDURE — 2500000003 HC RX 250 WO HCPCS: Performed by: STUDENT IN AN ORGANIZED HEALTH CARE EDUCATION/TRAINING PROGRAM

## 2025-04-04 PROCEDURE — 94640 AIRWAY INHALATION TREATMENT: CPT

## 2025-04-04 PROCEDURE — 05HB33Z INSERTION OF INFUSION DEVICE INTO RIGHT BASILIC VEIN, PERCUTANEOUS APPROACH: ICD-10-PCS

## 2025-04-04 PROCEDURE — 6360000002 HC RX W HCPCS: Performed by: INTERNAL MEDICINE

## 2025-04-04 PROCEDURE — 2500000003 HC RX 250 WO HCPCS: Performed by: INTERNAL MEDICINE

## 2025-04-04 PROCEDURE — 36569 INSJ PICC 5 YR+ W/O IMAGING: CPT

## 2025-04-04 PROCEDURE — 6370000000 HC RX 637 (ALT 250 FOR IP): Performed by: STUDENT IN AN ORGANIZED HEALTH CARE EDUCATION/TRAINING PROGRAM

## 2025-04-04 PROCEDURE — 6370000000 HC RX 637 (ALT 250 FOR IP): Performed by: INTERNAL MEDICINE

## 2025-04-04 PROCEDURE — 80048 BASIC METABOLIC PNL TOTAL CA: CPT

## 2025-04-04 PROCEDURE — 6370000000 HC RX 637 (ALT 250 FOR IP)

## 2025-04-04 PROCEDURE — 36415 COLL VENOUS BLD VENIPUNCTURE: CPT

## 2025-04-04 PROCEDURE — 6360000002 HC RX W HCPCS

## 2025-04-04 PROCEDURE — 84100 ASSAY OF PHOSPHORUS: CPT

## 2025-04-04 PROCEDURE — 85025 COMPLETE CBC W/AUTO DIFF WBC: CPT

## 2025-04-04 PROCEDURE — 82962 GLUCOSE BLOOD TEST: CPT

## 2025-04-04 PROCEDURE — 2500000003 HC RX 250 WO HCPCS: Performed by: NURSE PRACTITIONER

## 2025-04-04 PROCEDURE — 6360000002 HC RX W HCPCS: Performed by: NURSE PRACTITIONER

## 2025-04-04 RX ORDER — ALBUTEROL SULFATE 90 UG/1
2 INHALANT RESPIRATORY (INHALATION) 4 TIMES DAILY PRN
Qty: 18 G | Refills: 0 | Status: SHIPPED | OUTPATIENT
Start: 2025-04-04

## 2025-04-04 RX ORDER — BUDESONIDE AND FORMOTEROL FUMARATE DIHYDRATE 160; 4.5 UG/1; UG/1
2 AEROSOL RESPIRATORY (INHALATION)
Qty: 10.2 G | Refills: 3 | Status: SHIPPED | OUTPATIENT
Start: 2025-04-04

## 2025-04-04 RX ORDER — GUAIFENESIN 600 MG/1
600 TABLET, EXTENDED RELEASE ORAL 2 TIMES DAILY
Qty: 30 TABLET | Refills: 1 | Status: SHIPPED | OUTPATIENT
Start: 2025-04-04

## 2025-04-04 RX ORDER — INSULIN GLARGINE 100 [IU]/ML
10 INJECTION, SOLUTION SUBCUTANEOUS DAILY
Qty: 5 ADJUSTABLE DOSE PRE-FILLED PEN SYRINGE | Refills: 3 | Status: SHIPPED | OUTPATIENT
Start: 2025-04-04

## 2025-04-04 RX ORDER — FUROSEMIDE 20 MG/1
20 TABLET ORAL DAILY
Qty: 60 TABLET | Refills: 3 | Status: SHIPPED | OUTPATIENT
Start: 2025-04-05

## 2025-04-04 RX ORDER — PREDNISONE 20 MG/1
40 TABLET ORAL DAILY
Qty: 10 TABLET | Refills: 0 | Status: SHIPPED | OUTPATIENT
Start: 2025-04-04 | End: 2025-04-09

## 2025-04-04 RX ORDER — PREDNISONE 20 MG/1
40 TABLET ORAL DAILY
Status: DISCONTINUED | OUTPATIENT
Start: 2025-04-04 | End: 2025-04-04 | Stop reason: HOSPADM

## 2025-04-04 RX ORDER — SPIRONOLACTONE 25 MG/1
25 TABLET ORAL DAILY
Qty: 30 TABLET | Refills: 3 | Status: SHIPPED | OUTPATIENT
Start: 2025-04-05

## 2025-04-04 RX ORDER — BUDESONIDE AND FORMOTEROL FUMARATE DIHYDRATE 160; 4.5 UG/1; UG/1
2 AEROSOL RESPIRATORY (INHALATION)
Status: DISCONTINUED | OUTPATIENT
Start: 2025-04-04 | End: 2025-04-04 | Stop reason: HOSPADM

## 2025-04-04 RX ADMIN — SODIUM CHLORIDE, PRESERVATIVE FREE 1000 MG: 5 INJECTION INTRAVENOUS at 06:16

## 2025-04-04 RX ADMIN — INSULIN GLARGINE 5 UNITS: 100 INJECTION, SOLUTION SUBCUTANEOUS at 09:58

## 2025-04-04 RX ADMIN — PANCRELIPASE LIPASE, PANCRELIPASE PROTEASE, PANCRELIPASE AMYLASE 5000 UNITS: 5000; 17000; 24000 CAPSULE, DELAYED RELEASE ORAL at 12:31

## 2025-04-04 RX ADMIN — INSULIN LISPRO 2 UNITS: 100 INJECTION, SOLUTION INTRAVENOUS; SUBCUTANEOUS at 17:19

## 2025-04-04 RX ADMIN — PREDNISONE 40 MG: 20 TABLET ORAL at 12:31

## 2025-04-04 RX ADMIN — SPIRONOLACTONE 25 MG: 25 TABLET ORAL at 09:56

## 2025-04-04 RX ADMIN — SODIUM CHLORIDE, PRESERVATIVE FREE 10 ML: 5 INJECTION INTRAVENOUS at 09:58

## 2025-04-04 RX ADMIN — PANCRELIPASE LIPASE, PANCRELIPASE PROTEASE, PANCRELIPASE AMYLASE 20000 UNITS: 20000; 63000; 84000 CAPSULE, DELAYED RELEASE ORAL at 17:18

## 2025-04-04 RX ADMIN — HYDROMORPHONE HYDROCHLORIDE 0.5 MG: 1 INJECTION, SOLUTION INTRAMUSCULAR; INTRAVENOUS; SUBCUTANEOUS at 04:43

## 2025-04-04 RX ADMIN — PREGABALIN 75 MG: 50 CAPSULE ORAL at 09:55

## 2025-04-04 RX ADMIN — GUAIFENESIN 600 MG: 600 TABLET, EXTENDED RELEASE ORAL at 09:56

## 2025-04-04 RX ADMIN — SODIUM CHLORIDE, PRESERVATIVE FREE 1000 MG: 5 INJECTION INTRAVENOUS at 15:56

## 2025-04-04 RX ADMIN — ACETYLCYSTEINE 600 MG: 200 INHALANT RESPIRATORY (INHALATION) at 10:32

## 2025-04-04 RX ADMIN — CARVEDILOL 3.12 MG: 3.12 TABLET, FILM COATED ORAL at 09:56

## 2025-04-04 RX ADMIN — PANCRELIPASE LIPASE, PANCRELIPASE PROTEASE, PANCRELIPASE AMYLASE 20000 UNITS: 20000; 63000; 84000 CAPSULE, DELAYED RELEASE ORAL at 12:31

## 2025-04-04 RX ADMIN — IPRATROPIUM BROMIDE AND ALBUTEROL SULFATE 1 DOSE: 2.5; .5 SOLUTION RESPIRATORY (INHALATION) at 10:31

## 2025-04-04 RX ADMIN — HYDROMORPHONE HYDROCHLORIDE 0.5 MG: 1 INJECTION, SOLUTION INTRAMUSCULAR; INTRAVENOUS; SUBCUTANEOUS at 10:13

## 2025-04-04 RX ADMIN — SODIUM CHLORIDE, PRESERVATIVE FREE 20 MG: 5 INJECTION INTRAVENOUS at 09:56

## 2025-04-04 RX ADMIN — PANCRELIPASE LIPASE, PANCRELIPASE PROTEASE, PANCRELIPASE AMYLASE 5000 UNITS: 5000; 17000; 24000 CAPSULE, DELAYED RELEASE ORAL at 17:19

## 2025-04-04 RX ADMIN — CARVEDILOL 3.12 MG: 3.12 TABLET, FILM COATED ORAL at 17:18

## 2025-04-04 RX ADMIN — ENOXAPARIN SODIUM 30 MG: 100 INJECTION SUBCUTANEOUS at 09:56

## 2025-04-04 RX ADMIN — INSULIN LISPRO 2 UNITS: 100 INJECTION, SOLUTION INTRAVENOUS; SUBCUTANEOUS at 12:31

## 2025-04-04 RX ADMIN — IPRATROPIUM BROMIDE AND ALBUTEROL SULFATE 1 DOSE: 2.5; .5 SOLUTION RESPIRATORY (INHALATION) at 14:05

## 2025-04-04 RX ADMIN — FUROSEMIDE 20 MG: 40 TABLET ORAL at 09:55

## 2025-04-04 RX ADMIN — PANCRELIPASE LIPASE, PANCRELIPASE PROTEASE, PANCRELIPASE AMYLASE 20000 UNITS: 20000; 63000; 84000 CAPSULE, DELAYED RELEASE ORAL at 09:58

## 2025-04-04 RX ADMIN — DICLOFENAC SODIUM 4 G: 10 GEL TOPICAL at 09:57

## 2025-04-04 RX ADMIN — QUETIAPINE FUMARATE 25 MG: 25 TABLET ORAL at 09:56

## 2025-04-04 RX ADMIN — PANCRELIPASE LIPASE, PANCRELIPASE PROTEASE, PANCRELIPASE AMYLASE 5000 UNITS: 5000; 17000; 24000 CAPSULE, DELAYED RELEASE ORAL at 09:57

## 2025-04-04 ASSESSMENT — PAIN DESCRIPTION - LOCATION
LOCATION: KNEE
LOCATION: BACK

## 2025-04-04 ASSESSMENT — PAIN SCALES - GENERAL
PAINLEVEL_OUTOF10: 9
PAINLEVEL_OUTOF10: 9
PAINLEVEL_OUTOF10: 4
PAINLEVEL_OUTOF10: 9
PAINLEVEL_OUTOF10: 0

## 2025-04-04 ASSESSMENT — PAIN DESCRIPTION - DESCRIPTORS
DESCRIPTORS: ACHING
DESCRIPTORS: ACHING;DISCOMFORT

## 2025-04-04 ASSESSMENT — PAIN DESCRIPTION - ORIENTATION: ORIENTATION: RIGHT;LEFT

## 2025-04-04 ASSESSMENT — PAIN - FUNCTIONAL ASSESSMENT
PAIN_FUNCTIONAL_ASSESSMENT: ACTIVITIES ARE NOT PREVENTED
PAIN_FUNCTIONAL_ASSESSMENT: PREVENTS OR INTERFERES SOME ACTIVE ACTIVITIES AND ADLS

## 2025-04-04 NOTE — PROCEDURES
PICC Line Insertion Procedure Note    Procedure: Insertion of #5 fr PICC    Indications:  Long Term IV therapy    Procedure Details   Informed consent was obtained for the procedure, including sedation.  Risks of lung perforation, hemorrhage, and adverse drug reaction were discussed.     #5 fr PICC inserted to the R Basilic vein per hospital protocol.   Blood return:  yes    Findings:  Catheter inserted to 42 cm, with 0 cm. Exposed. Mid upper arm circumference is 32 cm.  There were no changes to vital signs. Catheter was flushed with 20 cc NS. Patient did tolerate procedure well.    Upon completion of procedure, all picc kit components accounted for and intact.    Recommendations:  Right arm PICC confirmed with 3cg.   PICC Brochure given to patient with teaching instruction.PROCEDURE NOTE  Date: 4/4/2025   Name: Santos Laughlin  YOB: 1955    Procedures

## 2025-04-04 NOTE — PROCEDURES
PICC Line Insertion Procedure Note    Procedure: Insertion of #5 fr PICC    Indications:  Home IV therapy    Procedure Details   Informed consent was obtained for the procedure, including sedation.  Risks of lung perforation, hemorrhage, and adverse drug reaction were discussed.     #5 fr PICC inserted to the R Basilic vein per hospital protocol.   Blood return:  yes    Findings:  Catheter inserted to 42 cm, with 0 cm. Exposed. Mid upper arm circumference is 32 cm.  There were no changes to vital signs. Catheter was flushed with 20 cc NS. Patient did tolerate procedure well.    Upon completion of procedure, all picc kit components accounted for and intact.    Previous placed RUE picc dislodged. New RUE PICC placed.    Recommendations:  Right arm picc confirmed with 3cg.  PICC Brochure given to patient with teaching instruction.PROCEDURE NOTE  Date: 4/4/2025   Name: Santos Laughlin  YOB: 1955    Procedures

## 2025-04-04 NOTE — PROGRESS NOTES
Gastroenterology Progress Note        Patient: Santos Laughlin MRN: [id number]  SSN: [id number]    YOB: 1955  Age: 70 y.o.  Sex: male      Admit Date: 3/28/2025    LOS: 6 days   Some abdominal pain, no fevere, felt better,  Room air   Past Medical History:   Diagnosis Date    Chronic back pain     Chronic pancreatitis (HCC)     Chronic prescription opiate use 03/28/2025    COPD (chronic obstructive pulmonary disease) (HCC)     Diabetes mellitus (HCC)     Ex-smoker     History of alcohol abuse     Not currently, many years ago    Hypertension     Presence of pancreatic duct stent         Current Facility-Administered Medications:     guaiFENesin (MUCINEX) extended release tablet 600 mg, 600 mg, Oral, BID, Margaret Turcios MD, 600 mg at 04/03/25 1018    acetylcysteine (MUCOMYST) 20 % solution 600 mg, 600 mg, Inhalation, BID RT, Margaret Turcios MD, 600 mg at 04/03/25 1112    budesonide (PULMICORT) nebulizer suspension 500 mcg, 0.5 mg, Nebulization, BID RT, Margaret Turcios MD, 500 mcg at 04/03/25 0747    ipratropium 0.5 mg-albuterol 2.5 mg (DUONEB) nebulizer solution 1 Dose, 1 Dose, Inhalation, Q4H RT, Margaret Turcios MD, 1 Dose at 04/03/25 1112    0.9 % sodium chloride infusion, , IntraVENous, PRN, Ave Wolfe, APRN - CNP    carvedilol (COREG) tablet 3.125 mg, 3.125 mg, Oral, BID WC, Franchesca Mcguire MD, 3.125 mg at 04/03/25 1019    meropenem (MERREM) 1,000 mg in sodium chloride (PF) 0.9 % 10 mL IV syringe, 1,000 mg, IntraVENous, Q8H, Franchesca Mcguire MD, 1,000 mg at 04/03/25 0601    insulin glargine (LANTUS) injection vial 5 Units, 5 Units, SubCUTAneous, BID, Franchesca Mcguire MD, 5 Units at 04/03/25 1020    glucose chewable tablet 16 g, 4 tablet, Oral, PRN, Franchesca Mcguire MD    dextrose bolus 10% 125 mL, 125 mL, IntraVENous, PRN **OR** dextrose bolus 10% 250 mL, 250 mL, IntraVENous, PRNMaynor Katherine M, MD    glucagon injection 1 mg, 1 mg, SubCUTAneous, PRN, Franchesca Mcguire, 
    Gastroenterology Progress Note        Patient: Santos Laughlin MRN: [id number]  SSN: [id number]    YOB: 1955  Age: 70 y.o.  Sex: male      Admit Date: 3/28/2025    LOS: 2 days     Subjective:   Confused, still complains of abdominal pain,  Past Medical History:   Diagnosis Date    Chronic back pain     Chronic pancreatitis (HCC)     Chronic prescription opiate use 03/28/2025    COPD (chronic obstructive pulmonary disease) (HCC)     Diabetes mellitus (HCC)     Ex-smoker     History of alcohol abuse     Not currently, many years ago    Hypertension     Presence of pancreatic duct stent         Current Facility-Administered Medications:     [Held by provider] amLODIPine (NORVASC) tablet 10 mg, 10 mg, Oral, Daily, Franchesca Mcguire MD    [Held by provider] losartan (COZAAR) tablet 12.5 mg, 12.5 mg, Oral, Daily, Franchesca Mcguire MD    [Held by provider] pregabalin (LYRICA) capsule 75 mg, 75 mg, Oral, BID, Franchesca Mcguire MD    QUEtiapine (SEROQUEL) tablet 25 mg, 25 mg, Oral, BID, Franchesca Mcguire MD    norepinephrine (LEVOPHED) 16 mg in sodium chloride 0.9 % 250 mL infusion (premix), 1-30 mcg/min, IntraVENous, Continuous, Franchesca Mcguire MD, Last Rate: 4.7 mL/hr at 03/30/25 1843, 5 mcg/min at 03/30/25 1843    0.9 % sodium chloride infusion, , IntraVENous, PRN, Ave Wolfe, APRN - CNP    insulin lispro (HUMALOG,ADMELOG) injection vial 0-8 Units, 0-8 Units, SubCUTAneous, 4x Daily AC & HS, Franchesca Mcguire MD    0.9 % sodium chloride infusion, , IntraVENous, PRN, Jose Enrique Tejada MD    potassium chloride (KLOR-CON M) extended release tablet 40 mEq, 40 mEq, Oral, PRN **OR** potassium bicarb-citric acid (EFFER-K) effervescent tablet 40 mEq, 40 mEq, Oral, PRN **OR** potassium chloride 10 mEq/100 mL IVPB (Peripheral Line), 10 mEq, IntraVENous, PRN, Jose Enrique Tejada MD    magnesium sulfate 2000 mg in 50 mL IVPB premix, 2,000 mg, IntraVENous, PRN, Jose Enrique Tejada MD    
    Gastroenterology Progress Note        Patient: Santos Laughlin MRN: [id number]  SSN: [id number]    YOB: 1955  Age: 70 y.o.  Sex: male      Admit Date: 3/28/2025    LOS: 4 days   Some abdominal pain, no fevere, better, family in room  Past Medical History:   Diagnosis Date    Chronic back pain     Chronic pancreatitis (HCC)     Chronic prescription opiate use 03/28/2025    COPD (chronic obstructive pulmonary disease) (HCC)     Diabetes mellitus (HCC)     Ex-smoker     History of alcohol abuse     Not currently, many years ago    Hypertension     Presence of pancreatic duct stent         Current Facility-Administered Medications:     0.9 % sodium chloride infusion, , IntraVENous, PRN, Ave Wolfe, APRN - CNP    carvedilol (COREG) tablet 3.125 mg, 3.125 mg, Oral, BID WC, Franchesca Mcguire MD, 3.125 mg at 04/01/25 0923    meropenem (MERREM) 1,000 mg in sodium chloride (PF) 0.9 % 10 mL IV syringe, 1,000 mg, IntraVENous, Q8H, Franchesca Mcguire MD    insulin glargine (LANTUS) injection vial 5 Units, 5 Units, SubCUTAneous, BID, Franchesca Mcguire MD    glucose chewable tablet 16 g, 4 tablet, Oral, PRN, Franchesca Mcguire MD    dextrose bolus 10% 125 mL, 125 mL, IntraVENous, PRN **OR** dextrose bolus 10% 250 mL, 250 mL, IntraVENous, PRN, Franchesca Mcgurie MD    glucagon injection 1 mg, 1 mg, SubCUTAneous, PRN, Franchesca Mcguire MD    dextrose 10 % infusion, , IntraVENous, Continuous PRN, Franchesca Mcguire MD    oxyCODONE (ROXICODONE) immediate release tablet 5 mg, 5 mg, Oral, Q4H PRN, Franchesca Mcguire MD    melatonin tablet 5 mg, 5 mg, Oral, Nightly PRN, Franchesca Mcguire MD, 5 mg at 04/01/25 0108    [Held by provider] amLODIPine (NORVASC) tablet 10 mg, 10 mg, Oral, Daily, Franchesca Mcguire MD    [Held by provider] losartan (COZAAR) tablet 12.5 mg, 12.5 mg, Oral, Daily, Franchesca Mcguire MD    pregabalin (LYRICA) capsule 75 mg, 75 mg, Oral, BID, Franchesca Mcguire MD    
    Hospitalist Consult Note               Daily Progress Note: 4/1/2025      Hospital Day: 5     Chief complaint:   Chief Complaint   Patient presents with    Abdominal Pain        Brief HPI/ Hospital course to date:  Santos Laughlin is a 70 y.o. male with PMH of Dm, chronic pancreatitis s/p recent pancreatic stent about 3 months ago, obesity, HTN, COPD. Presented to the ED because of abdominal pain. He was afebrile. On exam described as having abdominal tenderness in RUQ, epigastrium and LUQ. WBC 26,000 with elevated procal. Urinalysis possible UTI. CXR was unremarkable. CT Abdomen showed diffuse calcifications throughout the pancreas compatible with chronic pancreatitis with ascites and rim-enhancing collection posterior to the liver with multiple complex lesions within the left hepatic lobe, compatible with cyst or mass. Also distended gallbladder and dilated common duct, as well as right lung pneumonia. US showed hepatomegaly with intra and extrahepatic biliary dilatation, a hypoechoic left hepatic capsular mass and cystic right hepatic mass; also mild gall bladder wall thickening. In the ED, patient was hypotensive requiring norepinephrine. He was admitted to the ICU for further management. IV antibiotics started and ID consulted. Weaned off pressors. Now on IV meropenem, blood culture NGTD. Transfer to medical floor on 4/1. GI also on board, plan for EUS with biopsy on 4/2.     --------  Patient is seen today to evaluate for transfer to medical floor. Nursing reports patient feeling anxious, will order IM atarax PRN. Given cirrhosis noted on CT, will change morphine to percocet. Add IV dilaudid for breakthrough pain. Patient reports fall about a month ago, has right rib pain. Reviewed chest X-ray from that time, and also chest X-ray from today, showed no rib fracture. Will order diclofenac topical.         All ROS negative otherwise mentioned above.      /76   Pulse 93   Temp 98.3 °F (36.8 °C) (Oral)  
    Hospitalist Consult Note               Daily Progress Note: 4/3/2025      Hospital Day: 7     Chief complaint:   Chief Complaint   Patient presents with    Abdominal Pain      Brief HPI/ Hospital course to date:  Santos Laughlin is a 70 y.o. male with PMH of Dm, chronic pancreatitis s/p recent pancreatic stent about 3 months ago, obesity, HTN, COPD. Presented to the ED because of abdominal pain. He was afebrile. On exam described as having abdominal tenderness in RUQ, epigastrium and LUQ. WBC 26,000 with elevated procal. Urinalysis possible UTI. CXR was unremarkable. CT Abdomen showed diffuse calcifications throughout the pancreas compatible with chronic pancreatitis with ascites and rim-enhancing collection posterior to the liver with multiple complex lesions within the left hepatic lobe, compatible with cyst or mass. Also distended gallbladder and dilated common duct, as well as right lung pneumonia. US showed hepatomegaly with intra and extrahepatic biliary dilatation, a hypoechoic left hepatic capsular mass and cystic right hepatic mass; also mild gall bladder wall thickening. In the ED, patient was hypotensive requiring norepinephrine. He was admitted to the ICU for further management. IV antibiotics started and ID consulted. Weaned off pressors. Now on IV meropenem, blood culture NGTD. Transfer to medical floor on 4/1. GI also on board, plan for EUS with biopsy on 4/2.   ------------------------------------------------------------------  Patient is on oxygen 2 L.  Discussed with nurse to wean him down today.  He is less wheezing on exam.  Patient is feeling his breathing is better.  He was taken off oxygen yesterday and was feeling well.    All ROS negative otherwise mentioned above.      /69   Pulse 88   Temp 98.2 °F (36.8 °C) (Axillary)   Resp 16   Ht 1.753 m (5' 9.02\")   Wt 114.7 kg (252 lb 13.9 oz)   SpO2 95%   BMI 37.32 kg/m²  O2 Flow Rate (L/min): 2 L/min O2 Device: Nasal cannula    Temp 
1817 Called Luis to arrange transport to [facility] faxed PCS and face sheet and will pickup pt at 645p.Family did not feel comfortable transporting pt.  Informed nurse LEIGHANN Martinez.  
4 Eyes Skin Assessment     NAME:  Santos Laughlin  YOB: 1955  MEDICAL RECORD NUMBER:  [id number]    The patient is being assessed for  Other Weekly    I agree that at least one RN has performed a thorough Head to Toe Skin Assessment on the patient. ALL assessment sites listed below have been assessed.      Areas assessed by both nurses:    Head, Face, Ears, Shoulders, Back, Chest, Arms, Elbows, Hands, Sacrum. Buttock, Coccyx, Ischium, Legs. Feet and Heels, and Under Medical Devices         Does the Patient have a Wound? No noted wound(s)       Jhonatan Prevention initiated by RN: No  Wound Care Orders initiated by RN: No    Pressure Injury (Stage 3,4, Unstageable, DTI, NWPT, and Complex wounds) if present, place Wound referral order by RN under : No    New Ostomies, if present place, Ostomy referral order under : No     Nurse 1 eSignature: Electronically signed by JODY RODRIGUEZ RN on 4/2/25 at 10:12 AM EDT    **SHARE this note so that the co-signing nurse can place an eSignature**    Nurse 2 eSignature: Electronically signed by Shannan Church RN on 4/2/25 at 5:47 PM EDT   
4 Eyes Skin Assessment     NAME:  Santos Laughlin  YOB: 1955  MEDICAL RECORD NUMBER:  [id number]    The patient is being assessed for  Transfer to New Unit    I agree that at least one RN has performed a thorough Head to Toe Skin Assessment on the patient. ALL assessment sites listed below have been assessed.      Areas assessed by both nurses:    Head, Face, Ears, Shoulders, Back, Chest, Arms, Elbows, Hands, Sacrum. Buttock, Coccyx, Ischium, Legs. Feet and Heels, and Under Medical Devices         Does the Patient have a Wound? No noted wound(s)       Jhonatan Prevention initiated by RN: No  Wound Care Orders initiated by RN: No    Pressure Injury (Stage 3,4, Unstageable, DTI, NWPT, and Complex wounds) if present, place Wound referral order by RN under : No    New Ostomies, if present place, Ostomy referral order under : No     Nurse 1 eSignature: Electronically signed by JODY RODRIGUEZ RN on 4/1/25 at 3:33 PM EDT    **SHARE this note so that the co-signing nurse can place an eSignature**    Nurse 2 eSignature: Electronically signed by Lala Zelaya RN on 4/1/25 at 3:33 PM EDT   
CRITICAL CARE PROGRESS NOTE    Name: Santos Laughlin   : 1955   MRN: [id number]   Date: 3/30/2025      Diagnoses/problem list:   Septic shock  Multiple complex liver lesions  Fluid collection posterior to liver  Chronic pancreatitis with recent pancreatic stent  Dilated common bile duct  Ascites  MICAELA  Possible UTI  Anemia  DM    24-hour events:   Santos Laughlin is a 70 y.o. male with PMH of Dm, chronic pancreatitis s/p recent pancreatic stent about 3 months ago, obesity, HTN, COPD. Presented to the ED because of abdominal pain.  He was afebrile. On exam described as having abdominal tenderness in RUQ, epigastrium and LUQ. WBC 26,000 with elevated procal. Urinalysis with moderate pyuria but no bacteria.   CXR was unremarkable.  CT Abdomen showed diffuse calcifications throughout the pancreas compatible with chronic pancreatitis with ascites and rim-enhancing collection posterior to the liver with multiple complex lesions within the left hepatic lobe, compatible with cyst or mass. Also distended gallbladder and dilated common duct. US showed hepatomegaly with intra and extrahepatic biliary dilatation, a hypoechoic left hepatic capsular mass and cystic right hepatic  mass; also mild gall bladder wall thickening. In the ED, patient given dose IVF bolus, Cefepime and Flagyl. Patient was hypotensive requiring norepinephrine. He is being admitted to the ICU for further management.      On exam, the patient is resting in bed in no apparent distress. He is on RA. No respiratory distress. Complains of RUQ pain and epigastric pain. SBP 80's on Norepinephrine 9mcg. Denies any CP or SOB at this time. Daughter at bedside. All questions answered.     3/29: he reports pain in his RUQ and epigastric region. He is weaning NE after gentle hydration.   3/30: Abdominal pain improved in LLQ. More confused this am and precedex started.     ROS negative except as otherwise documented.     Assessment and plan:     NEUROLOGICAL:  
CRITICAL CARE PROGRESS NOTE    Name: Santos Laughlin   : 1955   MRN: [id number]   Date: 3/29/2025      Diagnoses/problem list:   Septic shock  Multiple complex liver lesions  Fluid collection posterior to liver  Chronic pancreatitis with recent pancreatic stent  Dilated common bile duct  Ascites  MICAELA  Possible UTI  Anemia  DM    24-hour events:   Santos Laughlin is a 70 y.o. male with PMH of Dm, chronic pancreatitis s/p recent pancreatic stent about 3 months ago, obesity, HTN, COPD. Presented to the ED because of abdominal pain.  He was afebrile. On exam described as having abdominal tenderness in RUQ, epigastrium and LUQ. WBC 26,000 with elevated procal. Urinalysis with moderate pyuria but no bacteria.   CXR was unremarkable.  CT Abdomen showed diffuse calcifications throughout the pancreas compatible with chronic pancreatitis with ascites and rim-enhancing collection posterior to the liver with multiple complex lesions within the left hepatic lobe, compatible with cyst or mass. Also distended gallbladder and dilated common duct. US showed hepatomegaly with intra and extrahepatic biliary dilatation, a hypoechoic left hepatic capsular mass and cystic right hepatic  mass; also mild gall bladder wall thickening. In the ED, patient given dose IVF bolus, Cefepime and Flagyl. Patient was hypotensive requiring norepinephrine. He is being admitted to the ICU for further management.      On exam, the patient is resting in bed in no apparent distress. He is on RA. No respiratory distress. Complains of RUQ pain and epigastric pain. SBP 80's on Norepinephrine 9mcg. Denies any CP or SOB at this time. Daughter at bedside. All questions answered.     3/29: he reports pain in his RUQ and epigastric region. He is weaning NE after gentle hydration.     ROS negative except as otherwise documented.     Assessment and plan:     NEUROLOGICAL:    Analgesia / sedation  -alert, oriented  -dilaudid PRN for pain control    PULMONOLOGY: 
CRITICAL CARE PROGRESS NOTE    Name: Santos Laughlin   : 1955   MRN: [id number]   Date: 3/31/2025      Diagnoses/problem list:   Septic shock  Multiple complex liver lesions  Fluid collection posterior to liver  Chronic pancreatitis with recent pancreatic stent  Dilated common bile duct  Ascites  MICAELA  Possible UTI  Anemia  DM    24-hour events:   Santos Laughlin is a 70 y.o. male with PMH of Dm, chronic pancreatitis s/p recent pancreatic stent about 3 months ago, obesity, HTN, COPD. Presented to the ED because of abdominal pain.  He was afebrile. On exam described as having abdominal tenderness in RUQ, epigastrium and LUQ. WBC 26,000 with elevated procal. Urinalysis with moderate pyuria but no bacteria.   CXR was unremarkable.  CT Abdomen showed diffuse calcifications throughout the pancreas compatible with chronic pancreatitis with ascites and rim-enhancing collection posterior to the liver with multiple complex lesions within the left hepatic lobe, compatible with cyst or mass. Also distended gallbladder and dilated common duct. US showed hepatomegaly with intra and extrahepatic biliary dilatation, a hypoechoic left hepatic capsular mass and cystic right hepatic  mass; also mild gall bladder wall thickening. In the ED, patient given dose IVF bolus, Cefepime and Flagyl. Patient was hypotensive requiring norepinephrine. He is being admitted to the ICU for further management.      On exam, the patient is resting in bed in no apparent distress. He is on RA. No respiratory distress. Complains of RUQ pain and epigastric pain. SBP 80's on Norepinephrine 9mcg. Denies any CP or SOB at this time. Daughter at bedside. All questions answered.     3/29: he reports pain in his RUQ and epigastric region. He is weaning NE after gentle hydration.   3/30: Abdominal pain improved in LLQ. More confused this am and precedex started.   3/31: Precedex discontinued and started seroquel with improved mentation    ROS negative except 
Comprehensive Nutrition Assessment    Type and Reason for Visit:  Reassess    Nutrition Recommendations/Plan:   Continue regular diet (advanced 4/3)  Restart HC HP ONS QD  Monitor and document PO intakes, I/Os     Malnutrition Assessment:  Malnutrition Status:  Moderate malnutrition (03/31/25 1528)    Context:  Acute Illness     Findings of the 6 clinical characteristics of malnutrition:  Energy Intake:  50% or less of estimated energy requirements for 5 or more days  Weight Loss:  No weight loss (may be masked by fluid)     Body Fat Loss:  No body fat loss     Muscle Mass Loss:  Mild muscle mass loss Temples (temporalis), Clavicles (pectoralis & deltoids)  Fluid Accumulation:  Mild (illness>nutr status)     Strength:  Not Performed    Nutrition Assessment:    Presented w/ abd pain. Pt w/ severe pancreatitis, pseudocysts, on CLD for possible ERCP, 51-75% intake documented. Discussed ONS order w/ MD, to keep as is: [facility] ONS TID. RD assessment d/t MST 3 - unsure wt loss, poor PO. Pt reports poor PO intake x 2 weeks PTA. Per EMR, pt does not appear to have had recent wt loss, wt trending up, moderate ascites noted on CT. Pt unsure of UBW, estimates 219#, 234.7# Jan 2024. Labs: BG 100s-260s, CRP 25, Alk phos 342, Na 133, BUN 44, Cr 2.64, lipase 8. Meds: zenpep, merrem                                                                                                                          (4/3) Diet advanced to regular today. Per provider note, pt improving overall. Transferred from ICU on 4/1. Plan is for him to d/c to rehab facility (potentially on 4/4) per CM. He will need two more weeks of IV abx. No updated intakes charted as diet just advanced. Continue to monitor. No new wt.    Nutrition Related Findings:    NFPE w/ moderate muscle wasting. No reported n/v/d/c, LBM 3/31. Nonpitting BLE edema. Wound Type: None       Current Nutrition Intake & Therapies:    Average Meal Intake: 51-75%  Average Supplements Intake: 
Echo completed. Report to follow.    
PT eval order received and acknowledged. PT eval attempted at 0914 however pt getting cleaned up by [facility] [place] [place] students. Will continue to follow patient and attempt PT eval at a later time. Thank you.    
Progress Note  Date:3/29/2025       Room:[facility]  Patient Name:Santos Laughlin     YOB: 1955     Age:70 y.o.        Subjective    Subjective Patient followed for sepsis  with CT scan showing multiple intra-hepatic lesions and extrahepatic fluid collection, as well as chronic pancreatitis, distended gallbladder and possible UTI. He was admitted to the ICU.  He is afebrile today with decreasing WBC and procal. He is currently on IV Meropenem. He was seen by GI earlier today. Patient lying in bed still complaining of pain in RUQ, epigastrium and occasionally LUQ.  Also reporting vague chest discomfort.      Objective         Vitals Last 24 Hours:  TEMPERATURE:  Temp  Av.9 °F (37.2 °C)  Min: 97.4 °F (36.3 °C)  Max: 100.7 °F (38.2 °C)  RESPIRATIONS RANGE: Resp  Av.2  Min: 11  Max: 29  PULSE OXIMETRY RANGE: SpO2  Av.5 %  Min: 80 %  Max: 100 %  PULSE RANGE: Pulse  Av.4  Min: 91  Max: 112  BLOOD PRESSURE RANGE: Systolic (24hrs), Av , Min:74 , Max:142   ; Diastolic (24hrs), Av, Min:26, Max:97       Objective  Vitals and nursing note reviewed.   Constitutional:       Appearance: He is ill-appearing.   HENT:      Head: Normocephalic and atraumatic.      Right Ear: External ear normal.      Left Ear: External ear normal.      Nose: Nose normal.   Eyes:      Extraocular Movements: Extraocular movements intact.      Pupils: Pupils are equal, round, and reactive to light.   Cardiovascular:      Rate and Rhythm: Normal rate and regular rhythm.      Heart sounds: Normal heart sounds. No murmur heard.  Pulmonary:      Effort: No respiratory distress.      Breath sounds: Normal breath sounds.   Abdominal:      General: There is distension.      Palpations: Abdomen is soft.      Tenderness: There is abdominal tenderness (RUQ and epigastrium). There is no right CVA tenderness, left CVA tenderness or guarding.   Genitourinary:     Comments: No Blanco catheter  Musculoskeletal:      Cervical back: 
Progress Note  Date:3/30/2025       Room:[facility]  Patient Name:Santos Laughlin     YOB: 1955     Age:70 y.o.        Subjective    Subjective Patient followed for sepsis  with CT scan showing multiple intra-hepatic lesions and extrahepatic fluid collection, as well as chronic pancreatitis, distended gallbladder and possible UTI. He remains in the ICUwas admitted to the ICU.  He is afebrile today with decreasing WBC and procal. He is currently on IV Meropenem. He was seen by GI earlier today. Patient lying in bed subjectively improved. Abdominal pain unchanged.      Objective         Vitals Last 24 Hours:  TEMPERATURE:  Temp  Av.2 °F (37.3 °C)  Min: 99 °F (37.2 °C)  Max: 99.9 °F (37.7 °C)  RESPIRATIONS RANGE: Resp  Av.2  Min: 14  Max: 29  PULSE OXIMETRY RANGE: SpO2  Av %  Min: 42 %  Max: 100 %  PULSE RANGE: Pulse  Av.2  Min: 98  Max: 116  BLOOD PRESSURE RANGE: Systolic (24hrs), Av , Min:94 , Max:135   ; Diastolic (24hrs), Av, Min:52, Max:102        Objective:  Vital signs: (most recent): Blood pressure 111/77, pulse 74, temperature 99.9 °F (37.7 °C), temperature source Oral, resp. rate 21, height 1.753 m (5' 9.02\"), weight 108.5 kg (239 lb 3.2 oz), SpO2 (!) 88%.      Vitals and nursing note reviewed.   Constitutional:       Appearance: He is ill-appearing.   HENT:      Head: Normocephalic and atraumatic.      Right Ear: External ear normal.      Left Ear: External ear normal.      Nose: Nose normal.   Eyes:      Extraocular Movements: Extraocular movements intact.      Pupils: Pupils are equal, round, and reactive to light.   Cardiovascular:      Rate and Rhythm: Normal rate and regular rhythm.      Heart sounds: Normal heart sounds. No murmur heard.  Pulmonary:      Effort: No respiratory distress.      Breath sounds: Normal breath sounds.   Abdominal:      General: There is distension.      Palpations: Abdomen is soft.      Tenderness: There is abdominal tenderness (RUQ and 
Progress Note  Date:3/31/2025       Room:[facility]  Patient Name:Santos Laughlin     YOB: 1955     Age:70 y.o.        Subjective    Subjective Patient followed for sepsis  with CT scan showing multiple intra-hepatic lesions and extrahepatic fluid collection, as well as chronic pancreatitis, distended gallbladder and possible UTI. He remains in the ICUwas admitted to the ICU.  He is afebrile today with decreasing WBC and procal. He is currently on IV Meropenem. He was seen by GI earlier today. Patient lying in bed subjectively improved. Abdominal pain unchanged. Daughter at bedside.     Objective         Vitals Last 24 Hours:  TEMPERATURE:  Temp  Av.6 °F (36.4 °C)  Min: 97.6 °F (36.4 °C)  Max: 97.6 °F (36.4 °C)  RESPIRATIONS RANGE: Resp  Av.4  Min: 19  Max: 34  PULSE OXIMETRY RANGE: SpO2  Av.2 %  Min: 96 %  Max: 100 %  PULSE RANGE: Pulse  Av  Min: 70  Max: 109  BLOOD PRESSURE RANGE: Systolic (24hrs), Av , Min:68 , Max:169   ; Diastolic (24hrs), Av, Min:42, Max:84         Objective:  Vital signs: (most recent): Blood pressure 121/63, pulse (!) 109, temperature 97.6 °F (36.4 °C), temperature source Oral, resp. rate (!) 34, height 1.753 m (5' 9.02\"), weight 114.7 kg (252 lb 13.9 oz), SpO2 99%.      Vitals and nursing note reviewed.   Constitutional:       Appearance: He is ill-appearing.   HENT:      Head: Normocephalic and atraumatic.      Right Ear: External ear normal.      Left Ear: External ear normal.      Nose: Nose normal.   Eyes:      Extraocular Movements: Extraocular movements intact.      Pupils: Pupils are equal, round, and reactive to light.   Cardiovascular:      Rate and Rhythm: Normal rate and regular rhythm.      Heart sounds: Normal heart sounds. No murmur heard.  Pulmonary:      Effort: No respiratory distress.      Breath sounds: Normal breath sounds.   Abdominal:      General: There is distension.      Palpations: Abdomen is soft.      Tenderness: There is 
Pt discharged to [facility] via [facility] to transport. Pts peripheral IV removed, no tele-box present on the pt, and pt leaving with PICC for long term ABX admin. This RN educated pt and family and addressed any questions or concerns. Pt and family voice understanding.     1848: This RN called report to [facility] and spoke w/ Pat. This RN answered any questions or concerns. Callback number given in the case of additional questions or concerns.   
RRT Clinical Rounding Nurse Progress Report      SUBJECTIVE: Patient assessed secondary to elevated Deterioration Index Score.      Vitals:    04/01/25 1100 04/01/25 1200 04/01/25 1300 04/01/25 1400   BP: 118/75 133/81 137/77 135/71   Pulse: 99 91 90 95   Resp: 25 26 22 25   Temp: 99.5 °F (37.5 °C)      TempSrc: Oral      SpO2: 100% 100%     Weight:       Height:            DETERIORATION INDEX SCORE: 53    ASSESSMENT:  Patient just transferred from ICU. Charge nurse aware.    PLAN:   Primary nurse will continue to monitor.    Ashwini Wynn RN  
RT Nebulizer Bronchodilator Protocol Note    There is a bronchodilator order in the chart from a provider indicating to follow the RT Bronchodilator Protocol and there is an “Initiate RT Bronchodilator Protocol” order as well (see protocol at bottom of note).    CXR Findings:  No results found.    The findings from the last RT Protocol Assessment were as follows:  Smoking: Chronic pulmonary disease  Respiratory Pattern: (P) Dyspnea on exertion or RR 21-25 bpm  Breath Sounds: Clear breath sounds  Cough: Strong, spontaneous, non-productive  Indication for Bronchodilator Therapy:    Bronchodilator Assessment Score: (P) 4    Aerosolized bronchodilator medication orders have been revised according to the RT Nebulizer Bronchodilator Protocol below.    Respiratory Therapist to perform RT Therapy Protocol Assessment initially then follow the protocol.  Repeat RT Therapy Protocol Assessment PRN for score 0-3 or on second treatment, BID, and PRN for scores above 3.    No Indications - adjust the frequency to every 6 hours PRN wheezing or bronchospasm, if no treatments needed after 48 hours then discontinue using Per Protocol order mode.     If indication present, adjust the RT bronchodilator orders based on the Bronchodilator Assessment Score as indicated below.  If a patient is on this medication at home then do not decrease Frequency below that used at home.    0-3 - enter or revise RT bronchodilator order(s) to equivalent RT Bronchodilator order with Frequency of every 4 hours PRN for wheezing or increased work of breathing using Per Protocol order mode.       4-6 - enter or revise RT Bronchodilator order(s) to two equivalent RT bronchodilator orders with one order with BID Frequency and one order with Frequency of every 4 hours PRN wheezing or increased work of breathing using Per Protocol order mode.         7-10 - enter or revise RT Bronchodilator order(s) to two equivalent RT bronchodilator orders with one order with 
Spiritual Health History and Assessment/Progress Note  [facility]    Initial Encounter,  , Life Adjustments,      Name: Santos Laughlin MRN: [id number]    Age: 70 y.o.     Sex: male   Language: English   Restoration: None   Severe sepsis     Date: 4/2/2025            Total Time Calculated: 46 min              Spiritual Assessment began in [facility]        Referral/Consult From: Rounding   Encounter Overview/Reason: Initial Encounter  Service Provided For: Patient and family together    Shannan, Belief, Meaning:   Patient identifies as spiritual, is connected with a shannan tradition or spiritual practice, and has beliefs or practices that help with coping during difficult times  Family/Friends identify as spiritual, are connected with a shannan tradition or spiritual practice, and have beliefs or practices that help with coping during difficult times      Importance and Influence:  Patient has spiritual/personal beliefs that influence decisions regarding their health  Family/Friends have spiritual/personal beliefs that influence decisions regarding the patient's health    Community:  Patient feels well-supported. Support system includes: Children and Extended family  Family/Friends feel well-supported. Support system includes: Children and Extended family    Assessment and Plan of Care:     Patient Interventions include: Facilitated expression of thoughts and feelings, Explored spiritual coping/struggle/distress, Engaged in theological reflection, Affirmed coping skills/support systems, and Other: ministry of presence, active listening, prayer and encouragement.   Family/Friends Interventions include: Other: Spiritual and emotional support    Patient Plan of Care: Spiritual Care available upon further referral  Family/Friends Plan of Care: Spiritual Care available upon further referral     visited with patient and his daughter Trish during routine rounding. They were both warmly and 
  Genitourinary:     Comments: No Blanco catheter  Musculoskeletal:      Cervical back: Neck supple.      Right lower leg: No edema.      Left lower leg: No edema.   Skin:     Findings: No rash.   Neurological:      Mental Status: He is alert and oriented to person, place, and time.   Psychiatric:         Mood and Affect: Mood normal.         Behavior: Behavior normal.         Thought Content: Thought content normal.         Judgment: Judgment normal.   Labs/Imaging/Diagnostics    Labs:  CBC:  Recent Labs     03/30/25  1445 03/31/25  0330 04/01/25  0300 04/01/25  1633   WBC 18.7* 16.3* 11.8*  --    RBC 2.29* 2.45* 2.35*  --    HGB 7.0* 7.0* 6.8* 8.1*   HCT 21.0* 22.3* 21.3* 25.3*   MCV 91.7 91.0 90.6  --    RDW 17.2* 17.2* 17.7*  --     302 276  --      WBC        CHEMISTRIES:  Recent Labs     03/30/25  0200 03/31/25  0330 04/01/25  0300   * 133* 136   K 5.1 4.9 4.4    108 108   CO2 19* 16* 18*   BUN 41* 44* 43*   CREATININE 2.67* 2.64* 1.60*   GLUCOSE 128* 160* 187*   PHOS  --   --  4.9*   MG  --   --  1.4*          Latest Reference Range & Units 03/28/25 16:23    Color, UA -   Susan   Glucose, Ur Negative mg/dL Negative   Bilirubin, Urine Negative   Negative   Ketones, Urine Negative mg/dL Negative   Specific Gravity, UA 1.003 - 1.030   1.029   Blood, Urine Negative   Negative   Protein, UA Negative mg/dL 30 !   Urobilinogen 0.1 - 1.0 EU/dL 0.1   Nitrite, Urine Negative   Negative   Leukocyte Esterase, Urine Negative   Trace !   Appearance Clear   Turbid !   pH, Urine 5.0 - 8.0   5.0   Hyaline Casts, UA 0 - 5 /lpf >20 (H)   Mucus, UA Negative /lpf Trace !   WBC, UA 0 - 4 /hpf 10-20   RBC, UA 0 - 5 /hpf 0-5   Epithelial Cells, UA Few /lpf Few   Bacteria, UA Negative /hpf Negative     LIVER PROFILE:  Recent Labs     03/30/25  0200 03/31/25  0330   AST 19 17   ALT 11* 10*   BILIDIR 0.5* 0.5*   BILITOT 0.8 0.8   ALKPHOS 322* 342*     Lactic acid 2.07 >1.33  Procal 176. 95 >147.61 >82.14 >45.38 
and epigastrium). There is no right CVA tenderness, left CVA tenderness or guarding.   Genitourinary:     Comments: No Blanco catheter  Musculoskeletal:      Cervical back: Neck supple.      Right lower leg: No edema.      Left lower leg: No edema.   Skin:     Findings: No rash.   Neurological:      Mental Status: He is alert and oriented to person, place, and time.   Psychiatric:         Mood and Affect: Mood normal.         Behavior: Behavior normal.         Thought Content: Thought content normal.         Judgment: Judgment normal.   Labs/Imaging/Diagnostics    Labs:  CBC:  Recent Labs     04/02/25  0451 04/03/25  0503 04/04/25  0529   WBC 10.2 12.5* 18.7*   RBC 2.68* 2.71* 2.63*   HGB 8.0* 8.0* 7.8*   HCT 24.6* 25.4* 25.1*   MCV 91.8 93.7 95.4   RDW 17.6* 17.9* 17.5*    292 300     WBC         CHEMISTRIES:  Recent Labs     04/02/25 0451 04/03/25  0503 04/04/25  0529    138 136   K 4.3 4.4 4.5   * 110* 109*   CO2 23 26 27   BUN 30* 28* 22*   CREATININE 1.02 0.74 0.65*   GLUCOSE 107* 104* 92   PHOS 4.3 3.3 3.3   MG 2.3 2.0 1.6          Latest Reference Range & Units 03/28/25 16:23    Color, UA -   Susan   Glucose, Ur Negative mg/dL Negative   Bilirubin, Urine Negative   Negative   Ketones, Urine Negative mg/dL Negative   Specific Gravity, UA 1.003 - 1.030   1.029   Blood, Urine Negative   Negative   Protein, UA Negative mg/dL 30 !   Urobilinogen 0.1 - 1.0 EU/dL 0.1   Nitrite, Urine Negative   Negative   Leukocyte Esterase, Urine Negative   Trace !   Appearance Clear   Turbid !   pH, Urine 5.0 - 8.0   5.0   Hyaline Casts, UA 0 - 5 /lpf >20 (H)   Mucus, UA Negative /lpf Trace !   WBC, UA 0 - 4 /hpf 10-20   RBC, UA 0 - 5 /hpf 0-5   Epithelial Cells, UA Few /lpf Few   Bacteria, UA Negative /hpf Negative          Lactic acid 2.07 >1.33  Procal 176. 95 >147.61 >82.14 >45.38 >22.58 >6.06           CRP 31.10 >27.70 >25.00 >17.60 >14.30 >12.00         Blood cultures (3/28) No growth FINAL  Blood cultures 
oz), 158 % IBW. Weight Source: Bed scale  Current BMI (kg/m2): 37.3  Usual Body Weight: 106.5 kg (234 lb 11.2 oz) (Jan 2024)  % Weight Change (Calculated): 7.7  Weight Adjustment For: No Adjustment  BMI Categories: Obese Class 2 (BMI 35.0 -39.9)    Estimated Daily Nutrient Needs:  Energy Requirements Based On: Kcal/kg  Weight Used for Energy Requirements: Current  Energy (kcal/day): 9663-8660 kcals/d (11-14 kcals/kg)  Weight Used for Protein Requirements: Ideal  Protein (g/day): 145 g/d (2 g/kg IBW)  Method Used for Fluid Requirements: ml/Kg  Fluid (ml/day): 2294 ml/d (20 ml/kg)    Nutrition Diagnosis:   Predicted inadequate energy intake related to altered GI function as evidenced by NPO or clear liquid status due to medical condition    Nutrition Interventions:   Food and/or Nutrient Delivery: Continue Current Diet, Continue Oral Nutrition Supplement (advance diet as able)  Nutrition Education/Counseling: No recommendation at this time  Coordination of Nutrition Care: Continue to monitor while inpatient, Interdisciplinary Rounds  Plan of Care discussed with: MDR team    Goals:  Goals: PO intake 75% or greater, by next RD assessment  Type of Goal: New goal  Previous Goal Met: New Goal    Nutrition Monitoring and Evaluation:   Behavioral-Environmental Outcomes: None Identified  Food/Nutrient Intake Outcomes: Diet Advancement/Tolerance, Food and Nutrient Intake, Supplement Intake  Physical Signs/Symptoms Outcomes: Biochemical Data, GI Status, Meal Time Behavior, Weight    Discharge Planning:    Too soon to determine     Ave Goetz RD  Contact: [phone]    
  Leukocytosis  -Trend fevers, WBC  -Blood cx x2, Urine Cx, Sputum Cx done  -continue meropenem  -procalcitonin and CRP daily downtrending  -infectious disease consulted    ICU DAILY CHECKLIST     Code Status:full  DVT Prophylaxis:on hold  T/L/D: PIV  Diet: regular  Activity Level:as tolerated  ABCDEF Bundle/Checklist Completed:Yes  Disposition: Transfer to non-ICU bed  Multidisciplinary Rounds Completed:  Yes. Daughter updated at bedside.     OBJECTIVE:     Blood pressure (!) 140/88, pulse 98, temperature 99.2 °F (37.3 °C), temperature source Oral, resp. rate 23, height 1.753 m (5' 9.02\"), weight 114.7 kg (252 lb 13.9 oz), SpO2 100%.  Physical Exam  Gen: alert, oriented  HEENT: NC/AT, supple  Cardiac: sinus tachycardia, no murmurs  Pulm: Clear to auscultation bilaterally  ABD: Soft, mildly tender to palpation in epigastrium and RUQ, bowel sounds diminished throughout  Extremities: mild edema to LE bilaterally and along his flanks bilaterally  Neuro: A/O X 3, no focal deficits     Labs and Data: Reviewed 04/01/25  Medications: Reviewed 04/01/25  Imaging: Reviewed 04/01/25    Intake/Output:     Intake/Output Summary (Last 24 hours) at 4/1/2025 0940  Last data filed at 4/1/2025 0537  Gross per 24 hour   Intake 440.75 ml   Output 1625 ml   Net -1184.25 ml       CRITICAL CARE DOCUMENTATION  I had a face to face encounter with the patient, reviewed and interpreted patient data including clinical events, labs, images, vital signs, I/O's, and examined patient.  I have discussed the case and the plan and management of the patient's care with the consulting services, the bedside nurses and the respiratory therapist.      NOTE OF PERSONAL INVOLVEMENT IN CARE   This patient has a high probability of imminent, clinically significant deterioration, which requires the highest level of preparedness to intervene urgently. I participated in the decision-making and personally managed or directed the management of the following life 
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   Ascites  7.   Normal gallbladder on CT scan  8.  Penicillin allergy (anxiety)  9.  Right middle lobe ?pneumonia (per CT Chest)    Comment:  Presumed infected hepatic cysts and apparently infected pancreatic pseudocyst, however, unable to capture any drainage for culture.  Responding to Meropenem with now normal WBC, decreasing  procal and CRP.     Plan   Continue IV Meropenen for 2 weeks; can be done as outpatient via midline  2.   Follow-up blood cultures  3.   In am, repeat procalcitonin, CRP  4.   Cleared for discharge from ID standpoint     Electronically signed by Herbert Ann MD      
  2.  Sitting down on and standing up from a chair with arms ( e.g., wheelchair, bedside commode, etc.)   [] 1   [x] 2   [] 3   [] 4   3.  Moving from lying on back to sitting on the side of the bed?   [] 1   [x] 2   [] 3   [] 4          How much help from another person does the patient currently need... Total A Lot A Little None   4.  Moving to and from a bed to a chair (including a wheelchair)?   [] 1   [x] 2   [] 3   [] 4   5.  Need to walk in hospital room?   [] 1   [x] 2   [] 3   [] 4   6.  Climbing 3-5 steps with a railing?   [x] 1   [] 2   [] 3   [] 4   © , Trustees of [facility], under license to [employer]. All rights reserved     Score:  Initial:  Most Recent: (Date: 2025 )   Interpretation of Tool:  Represents activities that are increasingly more difficult (i.e. Bed mobility, Transfers, Gait).  Score 24 23 22-20 19-15 14-10 9-7 6   Modifier CH CI CJ CK CL CM CN         Physical Therapy Evaluation Charge Determination   History Examination Presentation Decision-Making   HIGH Complexity :3+ comorbidities / personal factors will impact the outcome/ POC  HIGH Complexity : 4+ Standardized tests and measures addressing body structure, function, activity limitation and / or participation in recreation  LOW Complexity : Stable, uncomplicated  Other outcome measures [facility] 6  MEDIUM      Based on the above components, the patient evaluation is determined to be of the following complexity level: LOW    Pain Ratin/10 bilateral LE  Pain Intervention(s):   nursing notified and addressing, rest, and repositioning    Activity Tolerance:   Fair , requires frequent rest breaks, observed shortness of breath on exertion, and SPO2 stable on 2L    After treatment patient left in no apparent distress:   Bed locked and in lowest position Patient left in no apparent distress in bed, Call bell within reach, Bed/ chair alarm activated, and Side rails x3 and nsg updated.    COMMUNICATION/EDUCATION:   The 
PRN    HYDROmorphone HCl PF (DILAUDID) injection 0.5 mg  0.5 mg IntraVENous Q4H PRN    furosemide (LASIX) tablet 20 mg  20 mg Oral Daily    spironolactone (ALDACTONE) tablet 25 mg  25 mg Oral Daily    melatonin tablet 5 mg  5 mg Oral Nightly PRN    pregabalin (LYRICA) capsule 75 mg  75 mg Oral BID    QUEtiapine (SEROQUEL) tablet 25 mg  25 mg Oral BID    insulin lispro (HUMALOG,ADMELOG) injection vial 0-8 Units  0-8 Units SubCUTAneous 4x Daily AC & HS    0.9 % sodium chloride infusion   IntraVENous PRN    potassium chloride (KLOR-CON M) extended release tablet 40 mEq  40 mEq Oral PRN    Or    potassium bicarb-citric acid (EFFER-K) effervescent tablet 40 mEq  40 mEq Oral PRN    Or    potassium chloride 10 mEq/100 mL IVPB (Peripheral Line)  10 mEq IntraVENous PRN    magnesium sulfate 2000 mg in 50 mL IVPB premix  2,000 mg IntraVENous PRN    ondansetron (ZOFRAN-ODT) disintegrating tablet 4 mg  4 mg Oral Q8H PRN    Or    ondansetron (ZOFRAN) injection 4 mg  4 mg IntraVENous Q6H PRN    polyethylene glycol (GLYCOLAX) packet 17 g  17 g Oral Daily PRN    lipase-protease-amylase (ZENPEP) 64164-73794 units delayed release capsule 20,000 Units  20,000 Units Oral TID WC    And    lipase-protease-amylase (ZENPEP) delayed release capsule 5,000 Units  5,000 Units Oral TID WC    sodium chloride flush 0.9 % injection 5-40 mL  5-40 mL IntraVENous 2 times per day    sodium chloride flush 0.9 % injection 5-40 mL  5-40 mL IntraVENous PRN    0.9 % sodium chloride infusion   IntraVENous PRN    [Held by provider] enoxaparin Sodium (LOVENOX) injection 30 mg  30 mg SubCUTAneous BID    acetaminophen (TYLENOL) tablet 650 mg  650 mg Oral Q6H PRN    Or    acetaminophen (TYLENOL) suppository 650 mg  650 mg Rectal Q6H PRN    senna (SENOKOT) tablet 8.6 mg  1 tablet Oral Daily PRN    famotidine (PEPCID) 20 MG/2ML 20 mg in sodium chloride (PF) 0.9 % 10 mL injection  20 mg IntraVENous Daily         All relevant laboratory and imaging results reviewed 
Keeley         [unfilled]  Assessment:     Principal Problem:    Severe sepsis (HCC)  Active Problems:    Anemia    Ascites    Liver lesion    COPD (chronic obstructive pulmonary disease) (HCC)    Diabetes mellitus (HCC)    Hypertension    Presence of pancreatic duct stent    Chronic prescription opiate use    Septic shock (HCC)  Resolved Problems:    * No resolved hospital problems. *    Right abdominal pain, multiple cysts in the right upper quadrant in the liver,  History of pancreatitis, related to alcohol, had a stent intervention before from his history, but no history of hepatitis, other drug abuse,  Had the sepsis picture, abdominal infection likely, likely have intra-abdominal abscess or        CT reviewed, intra-abdominal and pancreatic pesudocysts with pancreatic stent disease, no normal pancreatic tissue left, likely patient had multiple pseudocysts with cyst caused liver compression, I doubt the cyst within the liver, but no abscess cannot rule  Had liver cirrhosis changes CT ultrasound,  Pancreatic insufficiency  Hepatitis negative  Patient more confused today, his alcohol withdrawal  Patient has renal insufficiency, not able to give IV contrast patient has obesity did not candidate for MRI MRCP,  Plan:   Continue current ICU stay, maintained on the Levophed to stabilize patient blood pressure  Flagyl and  Merrem,  Alcohol withdrawal protocol      The patient hemodynamic stable, I will do EGD/EUS ,to collect cyst fluids or /and to drainage the biggest cyts, which is very close to the stomach wall.                Signed By: Sravan Starkey MD     March 31, 2025          Thank you for allowing me to participate in this patients care    DISCLAIMER:  Please note that this report was generated to utilize Dragon Dictation system, the software is designed to speed over the accuracy.  Every effort has been done to attempt to correct this mistakes upon review, but there still might be some inadvertent errors in

## 2025-04-04 NOTE — PLAN OF CARE
Problem: Chronic Conditions and Co-morbidities  Goal: Patient's chronic conditions and co-morbidity symptoms are monitored and maintained or improved  4/2/2025 2258 by Danielle Summers RN  Outcome: Progressing  4/2/2025 1002 by Dot Hilario RN  Outcome: Progressing     Problem: Discharge Planning  Goal: Discharge to home or other facility with appropriate resources  4/2/2025 2258 by Danielle Summers RN  Outcome: Progressing  4/2/2025 1002 by Dot Hilario RN  Outcome: Progressing     Problem: Pain  Goal: Verbalizes/displays adequate comfort level or baseline comfort level  4/2/2025 2258 by Danielle Summers RN  Outcome: Progressing  4/2/2025 1002 by Dot Hilario RN  Outcome: Progressing     Problem: Skin/Tissue Integrity  Goal: Skin integrity remains intact  Description: 1.  Monitor for areas of redness and/or skin breakdown  2.  Assess vascular access sites hourly  3.  Every 4-6 hours minimum:  Change oxygen saturation probe site  4.  Every 4-6 hours:  If on nasal continuous positive airway pressure, respiratory therapy assess nares and determine need for appliance change or resting period  4/2/2025 2258 by Danielle Summers RN  Outcome: Progressing  4/2/2025 1002 by Dot Hilario RN  Outcome: Progressing     Problem: Safety - Adult  Goal: Free from fall injury  4/2/2025 2258 by Danielle Summers RN  Outcome: Progressing  4/2/2025 1002 by Dot Hilario RN  Outcome: Progressing     Problem: ABCDS Injury Assessment  Goal: Absence of physical injury  4/2/2025 2258 by Danielle Summers RN  Outcome: Progressing  4/2/2025 1002 by Dot Hilario RN  Outcome: Progressing     
  Problem: Chronic Conditions and Co-morbidities  Goal: Patient's chronic conditions and co-morbidity symptoms are monitored and maintained or improved  4/4/2025 1459 by Michelle Escobar RN  Outcome: Progressing  Flowsheets (Taken 4/4/2025 1459)  Care Plan - Patient's Chronic Conditions and Co-Morbidity Symptoms are Monitored and Maintained or Improved: Monitor and assess patient's chronic conditions and comorbid symptoms for stability, deterioration, or improvement  4/4/2025 1458 by Michelle Escobar RN  Outcome: Progressing  4/4/2025 0225 by Danielle Summers RN  Outcome: Progressing  Flowsheets (Taken 4/3/2025 2019)  Care Plan - Patient's Chronic Conditions and Co-Morbidity Symptoms are Monitored and Maintained or Improved: Monitor and assess patient's chronic conditions and comorbid symptoms for stability, deterioration, or improvement     Problem: Discharge Planning  Goal: Discharge to home or other facility with appropriate resources  4/4/2025 1459 by Michelle Escobar RN  Outcome: Progressing  Flowsheets (Taken 4/4/2025 1459)  Discharge to home or other facility with appropriate resources:   Identify barriers to discharge with patient and caregiver   Arrange for needed discharge resources and transportation as appropriate   Identify discharge learning needs (meds, wound care, etc)  4/4/2025 1458 by Michelle Escobar RN  Outcome: Progressing  4/4/2025 0225 by Danielle Summers RN  Outcome: Progressing  Flowsheets (Taken 4/3/2025 2019)  Discharge to home or other facility with appropriate resources: Identify barriers to discharge with patient and caregiver     Problem: Pain  Goal: Verbalizes/displays adequate comfort level or baseline comfort level  4/4/2025 1459 by Michelle Escobar RN  Outcome: Progressing  Flowsheets (Taken 4/4/2025 1459)  Verbalizes/displays adequate comfort level or baseline comfort level:   Encourage patient to monitor pain and request assistance   Assess pain using appropriate pain scale   Administer 
  Problem: Chronic Conditions and Co-morbidities  Goal: Patient's chronic conditions and co-morbidity symptoms are monitored and maintained or improved  4/4/2025 1831 by Michelle Escobar RN  Outcome: Adequate for Discharge  4/4/2025 1459 by Michelle Escobar RN  Outcome: Progressing  Flowsheets (Taken 4/4/2025 1459)  Care Plan - Patient's Chronic Conditions and Co-Morbidity Symptoms are Monitored and Maintained or Improved: Monitor and assess patient's chronic conditions and comorbid symptoms for stability, deterioration, or improvement  4/4/2025 1458 by Michelle Escobar RN  Outcome: Progressing     Problem: Discharge Planning  Goal: Discharge to home or other facility with appropriate resources  4/4/2025 1831 by Michelle Escobar RN  Outcome: Adequate for Discharge  4/4/2025 1459 by Michelle Escobar RN  Outcome: Progressing  Flowsheets (Taken 4/4/2025 1459)  Discharge to home or other facility with appropriate resources:   Identify barriers to discharge with patient and caregiver   Arrange for needed discharge resources and transportation as appropriate   Identify discharge learning needs (meds, wound care, etc)  4/4/2025 1458 by Michelle Escobar RN  Outcome: Progressing     Problem: Pain  Goal: Verbalizes/displays adequate comfort level or baseline comfort level  4/4/2025 1831 by Michelle Escobar RN  Outcome: Adequate for Discharge  4/4/2025 1459 by Michelle Escobar RN  Outcome: Progressing  Flowsheets (Taken 4/4/2025 1459)  Verbalizes/displays adequate comfort level or baseline comfort level:   Encourage patient to monitor pain and request assistance   Assess pain using appropriate pain scale   Administer analgesics based on type and severity of pain and evaluate response   Implement non-pharmacological measures as appropriate and evaluate response  4/4/2025 1458 by Michelle Escobar RN  Outcome: Progressing     Problem: Skin/Tissue Integrity  Goal: Skin integrity remains intact  Description: 1.  Monitor for areas of redness 
  Problem: Chronic Conditions and Co-morbidities  Goal: Patient's chronic conditions and co-morbidity symptoms are monitored and maintained or improved  Outcome: Progressing     Problem: Discharge Planning  Goal: Discharge to home or other facility with appropriate resources  Outcome: Progressing     Problem: Pain  Goal: Verbalizes/displays adequate comfort level or baseline comfort level  Outcome: Progressing     Problem: Skin/Tissue Integrity  Goal: Skin integrity remains intact  Description: 1.  Monitor for areas of redness and/or skin breakdown  2.  Assess vascular access sites hourly  3.  Every 4-6 hours minimum:  Change oxygen saturation probe site  4.  Every 4-6 hours:  If on nasal continuous positive airway pressure, respiratory therapy assess nares and determine need for appliance change or resting period  Outcome: Progressing     Problem: ABCDS Injury Assessment  Goal: Absence of physical injury  Outcome: Progressing     Problem: Safety - Adult  Goal: Free from fall injury  Outcome: Progressing     Problem: Safety - Medical Restraint  Goal: Remains free of injury from restraints (Restraint for Interference with Medical Device)  Description: INTERVENTIONS:  1. Determine that other, less restrictive measures have been tried or would not be effective before applying the restraint  2. Evaluate the patient's condition at the time of restraint application  3. Inform patient/family regarding the reason for restraint  4. Q2H: Monitor safety, psychosocial status, comfort, nutrition and hydration  Outcome: Progressing     Problem: Neurosensory - Adult  Goal: Achieves stable or improved neurological status  Outcome: Progressing  Goal: Achieves maximal functionality and self care  Outcome: Progressing     Problem: Respiratory - Adult  Goal: Achieves optimal ventilation and oxygenation  Outcome: Progressing     Problem: Cardiovascular - Adult  Goal: Maintains optimal cardiac output and hemodynamic stability  Outcome: 
  Problem: Chronic Conditions and Co-morbidities  Goal: Patient's chronic conditions and co-morbidity symptoms are monitored and maintained or improved  Outcome: Progressing  Flowsheets (Taken 4/3/2025 2019)  Care Plan - Patient's Chronic Conditions and Co-Morbidity Symptoms are Monitored and Maintained or Improved: Monitor and assess patient's chronic conditions and comorbid symptoms for stability, deterioration, or improvement     Problem: Discharge Planning  Goal: Discharge to home or other facility with appropriate resources  Outcome: Progressing  Flowsheets (Taken 4/3/2025 2019)  Discharge to home or other facility with appropriate resources: Identify barriers to discharge with patient and caregiver     Problem: Pain  Goal: Verbalizes/displays adequate comfort level or baseline comfort level  Outcome: Progressing     Problem: Skin/Tissue Integrity  Goal: Skin integrity remains intact  Description: 1.  Monitor for areas of redness and/or skin breakdown  2.  Assess vascular access sites hourly  3.  Every 4-6 hours minimum:  Change oxygen saturation probe site  4.  Every 4-6 hours:  If on nasal continuous positive airway pressure, respiratory therapy assess nares and determine need for appliance change or resting period  Outcome: Progressing     Problem: ABCDS Injury Assessment  Goal: Absence of physical injury  Outcome: Progressing     Problem: Safety - Adult  Goal: Free from fall injury  Outcome: Progressing     
OCCUPATIONAL THERAPY EVALUATION  Patient: Santos Laughlin (70 y.o. male)  Date: 4/3/2025  Primary Diagnosis: Liver mass [R16.0]  Septic shock (HCC) [A41.9, R65.21]  Severe sepsis [A41.9, R65.20]  Acute on chronic pancreatitis (HCC) [K85.90, K86.1]  Sepsis, due to unspecified organism, unspecified whether acute organ dysfunction present (HCC) [A41.9]  Procedure(s) (LRB):  ESOPHAGOGASTRODUODENOSCOPY (N/A)  ENDOSCOPIC ULTRASOUND (N/A) 1 Day Post-Op   Precautions: Ax2 OOB, Fall Risk, Bed Alarm                Recommendations for nursing mobility: Encourage HEP in prep for ADLs/mobility; see handout for details, Frequent repositioning to prevent skin breakdown, Use of bed/chair alarm for safety, LE elevation for management of edema, and Assist x2    In place during session:Nasal Cannula 2L, External Catheter, and EKG/telemetry   ASSESSMENT  Pt is a 70 y.o. male presenting to [facility] with c/o abdominal pain, admitted 3/28/25 and currently being treated for multiple complex hepatic cyst, sepsis, acute hypoxic respiratory failure, R lung PNA, chronic pancreatitis, cirrhosis, moderate ascites, UTI, MICAELA, diabetes, alcohol use disorder, anxiety, and a fall with R rib pain. Pt received semi-supine in bed upon arrival, AXO x person and time, and agreeable to OT evaluation.     Based on current observations, pt presents with decreased  functional mobility, independence in ADLs, high-level IADLs, ROM, strength, body mechanics, activity tolerance, safety awareness, cognition, command following, attention/concentration, coordination, balance, proprioception, posture, increased pain levels (see below for objective details and assist levels).     Overall, pt tolerates session fair with c/o 7-10/10 rib pain. Pt completed bed mobility with mod A in prep for LB dressing. Mod A req'd for LB dressing (see details below). OOB functional mobility completed in prep for toilet transfers with mod-max Ax2 for sit<>stand transfers and min-mod Ax2 for 
Patient c/o of pain of 6 of 10, unable to tell nurse at what level he requires medication for pain and denies needing pain medication at this time.  Patient stated his pain has improved compared to yesterday   
Patient is being transferred to medical floor   
assistance;Partial/Moderate assistance;2 Person assistance  Balance:  Balance  Sitting: Impaired  Sitting - Static: Good (unsupported)  Sitting - Dynamic: Fair (occasional)  Standing: Impaired  Standing - Static: Constant support;Fair  Standing - Dynamic: Constant support;Fair;Poor  Wheelchair Mobility:  Wheelchair Management  Wheelchair Management: No  Ambulation/Gait Training:  Gait  Gait Training: Yes  Overall Level of Assistance: Minimal assistance;Partial/Moderate assistance;2 Person assistance  Distance (ft): 2 Feet  Assistive Device: Walker, rolling;Gait belt  Speed/Twila: Slow  Step Length: Right shortened;Left shortened  Gait Abnormalities: Decreased step clearance    Pain Ratin/10 reported    Activity Tolerance:   Fair     After treatment patient left in no apparent distress:   Bed locked and returned to lowest position, Patient left in no apparent distress in bed, Call bell within reach, Bed/ chair alarm activated, Side rails x3, and Heels elevated for pressure relief, and nsg updated     COMMUNICATION/COLLABORATION:   The patient’s plan of care was discussed with: Occupational therapist and Registered nurse    Patient Education  Education Given To: Patient  Education Provided: Role of Therapy;Plan of Care;Home Exercise Program;Precautions;Transfer Training;Mobility Training;Energy Conservation;Equipment;Fall Prevention Strategies  Education Provided Comments: RODRIGUEZ marquez, safety and importance of mobility  Education Method: Demonstration;Verbal  Barriers to Learning: Cognition  Education Outcome: Verbalized understanding;Continued education needed;Demonstrated understanding    PT/OT sessions occurred together for increased safety of pt and clinician.     Margarita Stein PTA  Minutes: 34

## 2025-04-04 NOTE — DISCHARGE SUMMARY
Fall  - diclofenac topical for right rib pain  Going to rehab    Discharge Medications:      Medication List        START taking these medications      budesonide-formoterol 160-4.5 MCG/ACT Aero  Commonly known as: SYMBICORT  Inhale 2 puffs into the lungs in the morning and 2 puffs in the evening.     furosemide 20 MG tablet  Commonly known as: LASIX  Take 1 tablet by mouth daily  Start taking on: April 5, 2025     guaiFENesin 600 MG extended release tablet  Commonly known as: MUCINEX  Take 1 tablet by mouth 2 times daily     predniSONE 20 MG tablet  Commonly known as: DELTASONE  Take 2 tablets by mouth daily for 5 doses     spironolactone 25 MG tablet  Commonly known as: ALDACTONE  Take 1 tablet by mouth daily  Start taking on: April 5, 2025            CHANGE how you take these medications      * albuterol sulfate  (90 Base) MCG/ACT inhaler  Commonly known as: PROVENTIL;VENTOLIN;PROAIR  Inhale 2 puffs into the lungs every 4 hours as needed for Wheezing  What changed: Another medication with the same name was added. Make sure you understand how and when to take each.     * albuterol sulfate  (90 Base) MCG/ACT inhaler  Commonly known as: Ventolin HFA  Inhale 2 puffs into the lungs 4 times daily as needed for Wheezing  What changed: You were already taking a medication with the same name, and this prescription was added. Make sure you understand how and when to take each.     Lantus SoloStar 100 UNIT/ML injection pen  Generic drug: insulin glargine  Inject 10 Units into the skin daily  What changed:   how much to take  how to take this  when to take this           * This list has 2 medication(s) that are the same as other medications prescribed for you. Read the directions carefully, and ask your doctor or other care provider to review them with you.                CONTINUE taking these medications      Creon 56700-57539 units delayed release capsule  Generic drug: lipase-protease-amylase     FreeStyle  No

## 2025-04-04 NOTE — CARE COORDINATION
0740: Chart reviewed.    Per notes; patient on room air and IV ABX followed dietitian, GI and ID.    Midline pending placement for long term IV ABX.    [facility] accepted patient when medically cleared for discharge.    Updates attached in CarePort.    CM will continue to follow patient and recs of medical team.    0905: Facility is requesting a PICC line rather than a midline. Attending notified.    1205: Discharge summary/order noted and attached in CarePort with MAR.     CM spoke with patient's daughterTrish at desk who understands transportation is needed to the facility after the line is placed. SNF address, phone and room assignment provided in writing.    IMM delivered.    Line pending.    When line is placed and transportation is available, patient to discharge to:  [place] [address]  Room [address]    Report to be called to [phone].    Discharge Checklist Completed.    Transition of Care Plan:    RUR: 18%  Prior Level of Functioning: Independent  Disposition: SNF  STANISLAW: 04/04/2025  If SNF or IPR: Date FOC offered: 04/02/2025  Date FOC received: 04/02/2025  Accepting facility: [facility]  Date authorization started with reference number: N/A  Date authorization received and expires: N/A  Follow up appointments: Discharge Summary  DME needed: None  Transportation at discharge: Family  IM/IMM Medicare/ letter given: 04/04/2025  Is patient a  and connected with [facility]? No  If yes, was [place] transfer form completed and [facility] notified? N/A  Caregiver Contact: DaughterTrish  Discharge Caregiver contacted prior to discharge? [facility]  Care Conference needed? No  Barriers to discharge: PICC line

## 2025-04-05 LAB
L PNEUMO1 AG UR QL IA: NEGATIVE
SPECIMEN SOURCE: NORMAL

## 2025-05-01 ENCOUNTER — HOSPITAL ENCOUNTER (INPATIENT)
Facility: HOSPITAL | Age: 70
LOS: 9 days | Discharge: NURSING, REHAB OR LONG-TERM CARE | DRG: 812 | End: 2025-05-10
Attending: EMERGENCY MEDICINE | Admitting: HOSPITALIST
Payer: MEDICARE

## 2025-05-01 DIAGNOSIS — I48.0 PAROXYSMAL ATRIAL FIBRILLATION (HCC): ICD-10-CM

## 2025-05-01 DIAGNOSIS — R60.0 LOWER EXTREMITY EDEMA: ICD-10-CM

## 2025-05-01 DIAGNOSIS — Z79.891 CHRONIC PRESCRIPTION OPIATE USE: ICD-10-CM

## 2025-05-01 DIAGNOSIS — D64.9 ANEMIA, UNSPECIFIED TYPE: Primary | ICD-10-CM

## 2025-05-01 DIAGNOSIS — I48.91 NEW ONSET ATRIAL FIBRILLATION (HCC): ICD-10-CM

## 2025-05-01 PROBLEM — D50.9 ANEMIA, IRON DEFICIENCY: Status: ACTIVE | Noted: 2025-05-01

## 2025-05-01 LAB
ALBUMIN SERPL-MCNC: 1.6 G/DL (ref 3.5–5)
ALBUMIN/GLOB SERPL: 0.3 (ref 1.1–2.2)
ALP SERPL-CCNC: 113 U/L (ref 45–117)
ALT SERPL-CCNC: 8 U/L (ref 12–78)
ANION GAP SERPL CALC-SCNC: 7 MMOL/L (ref 2–12)
AST SERPL W P-5'-P-CCNC: 23 U/L (ref 15–37)
BASOPHILS # BLD: 0.03 K/UL (ref 0–0.1)
BASOPHILS # BLD: 0.03 K/UL (ref 0–0.1)
BASOPHILS NFR BLD: 0.3 % (ref 0–1)
BASOPHILS NFR BLD: 0.3 % (ref 0–1)
BILIRUB SERPL-MCNC: 0.3 MG/DL (ref 0.2–1)
BNP SERPL-MCNC: 4185 PG/ML
BUN SERPL-MCNC: 6 MG/DL (ref 6–20)
BUN/CREAT SERPL: 9 (ref 12–20)
CA-I BLD-MCNC: 7 MG/DL (ref 8.5–10.1)
CHLORIDE SERPL-SCNC: 101 MMOL/L (ref 97–108)
CO2 SERPL-SCNC: 28 MMOL/L (ref 21–32)
COLLECT DATE STL: NORMAL
CREAT SERPL-MCNC: 0.7 MG/DL (ref 0.7–1.3)
DIFFERENTIAL METHOD BLD: ABNORMAL
DIFFERENTIAL METHOD BLD: ABNORMAL
EOSINOPHIL # BLD: 0.21 K/UL (ref 0–0.4)
EOSINOPHIL # BLD: 0.29 K/UL (ref 0–0.4)
EOSINOPHIL NFR BLD: 2.2 % (ref 0–7)
EOSINOPHIL NFR BLD: 3.2 % (ref 0–7)
ERYTHROCYTE [DISTWIDTH] IN BLOOD BY AUTOMATED COUNT: 16.7 % (ref 11.5–14.5)
ERYTHROCYTE [DISTWIDTH] IN BLOOD BY AUTOMATED COUNT: 16.8 % (ref 11.5–14.5)
GLOBULIN SER CALC-MCNC: 5.5 G/DL (ref 2–4)
GLUCOSE BLD STRIP.AUTO-MCNC: 135 MG/DL (ref 65–100)
GLUCOSE SERPL-MCNC: 222 MG/DL (ref 65–100)
HCT VFR BLD AUTO: 20.4 % (ref 36.6–50.3)
HCT VFR BLD AUTO: 22.7 % (ref 36.6–50.3)
HEMOCCULT SP1 STL QL: NEGATIVE
HGB BLD-MCNC: 6.3 G/DL (ref 12.1–17)
HGB BLD-MCNC: 7 G/DL (ref 12.1–17)
IMM GRANULOCYTES # BLD AUTO: 0.03 K/UL (ref 0–0.04)
IMM GRANULOCYTES # BLD AUTO: 0.08 K/UL (ref 0–0.04)
IMM GRANULOCYTES NFR BLD AUTO: 0.3 % (ref 0–0.5)
IMM GRANULOCYTES NFR BLD AUTO: 0.9 % (ref 0–0.5)
LYMPHOCYTES # BLD: 1.31 K/UL (ref 0.8–3.5)
LYMPHOCYTES # BLD: 1.49 K/UL (ref 0.8–3.5)
LYMPHOCYTES NFR BLD: 13.9 % (ref 12–49)
LYMPHOCYTES NFR BLD: 16.2 % (ref 12–49)
MCH RBC QN AUTO: 27.6 PG (ref 26–34)
MCH RBC QN AUTO: 28.3 PG (ref 26–34)
MCHC RBC AUTO-ENTMCNC: 30.8 G/DL (ref 30–36.5)
MCHC RBC AUTO-ENTMCNC: 30.9 G/DL (ref 30–36.5)
MCV RBC AUTO: 89.5 FL (ref 80–99)
MCV RBC AUTO: 91.9 FL (ref 80–99)
MONOCYTES # BLD: 0.98 K/UL (ref 0–1)
MONOCYTES # BLD: 0.99 K/UL (ref 0–1)
MONOCYTES NFR BLD: 10.4 % (ref 5–13)
MONOCYTES NFR BLD: 10.8 % (ref 5–13)
NEUTS SEG # BLD: 6.37 K/UL (ref 1.8–8)
NEUTS SEG # BLD: 6.79 K/UL (ref 1.8–8)
NEUTS SEG NFR BLD: 69.2 % (ref 32–75)
NEUTS SEG NFR BLD: 72.3 % (ref 32–75)
NRBC # BLD: 0 K/UL (ref 0–0.01)
NRBC # BLD: 0 K/UL (ref 0–0.01)
NRBC BLD-RTO: 0 PER 100 WBC
NRBC BLD-RTO: 0 PER 100 WBC
PERFORMED BY:: ABNORMAL
PLATELET # BLD AUTO: 307 K/UL (ref 150–400)
PLATELET # BLD AUTO: 318 K/UL (ref 150–400)
PMV BLD AUTO: 9.6 FL (ref 8.9–12.9)
PMV BLD AUTO: 9.9 FL (ref 8.9–12.9)
POTASSIUM SERPL-SCNC: 3.3 MMOL/L (ref 3.5–5.1)
PROT SERPL-MCNC: 7.1 G/DL (ref 6.4–8.2)
RBC # BLD AUTO: 2.28 M/UL (ref 4.1–5.7)
RBC # BLD AUTO: 2.47 M/UL (ref 4.1–5.7)
SODIUM SERPL-SCNC: 136 MMOL/L (ref 136–145)
TROPONIN I SERPL HS-MCNC: 15 NG/L (ref 0–76)
WBC # BLD AUTO: 9.2 K/UL (ref 4.1–11.1)
WBC # BLD AUTO: 9.4 K/UL (ref 4.1–11.1)

## 2025-05-01 PROCEDURE — 94761 N-INVAS EAR/PLS OXIMETRY MLT: CPT

## 2025-05-01 PROCEDURE — 6370000000 HC RX 637 (ALT 250 FOR IP): Performed by: HOSPITALIST

## 2025-05-01 PROCEDURE — 86900 BLOOD TYPING SEROLOGIC ABO: CPT

## 2025-05-01 PROCEDURE — 86901 BLOOD TYPING SEROLOGIC RH(D): CPT

## 2025-05-01 PROCEDURE — 2500000003 HC RX 250 WO HCPCS: Performed by: HOSPITALIST

## 2025-05-01 PROCEDURE — 6360000002 HC RX W HCPCS: Performed by: EMERGENCY MEDICINE

## 2025-05-01 PROCEDURE — 96376 TX/PRO/DX INJ SAME DRUG ADON: CPT

## 2025-05-01 PROCEDURE — 36415 COLL VENOUS BLD VENIPUNCTURE: CPT

## 2025-05-01 PROCEDURE — 86850 RBC ANTIBODY SCREEN: CPT

## 2025-05-01 PROCEDURE — 93005 ELECTROCARDIOGRAM TRACING: CPT | Performed by: EMERGENCY MEDICINE

## 2025-05-01 PROCEDURE — 85025 COMPLETE CBC W/AUTO DIFF WBC: CPT

## 2025-05-01 PROCEDURE — 2580000003 HC RX 258: Performed by: EMERGENCY MEDICINE

## 2025-05-01 PROCEDURE — 86923 COMPATIBILITY TEST ELECTRIC: CPT

## 2025-05-01 PROCEDURE — 1100000000 HC RM PRIVATE

## 2025-05-01 PROCEDURE — 94640 AIRWAY INHALATION TREATMENT: CPT

## 2025-05-01 PROCEDURE — 96374 THER/PROPH/DIAG INJ IV PUSH: CPT

## 2025-05-01 PROCEDURE — 36430 TRANSFUSION BLD/BLD COMPNT: CPT

## 2025-05-01 PROCEDURE — P9016 RBC LEUKOCYTES REDUCED: HCPCS

## 2025-05-01 PROCEDURE — 82962 GLUCOSE BLOOD TEST: CPT

## 2025-05-01 PROCEDURE — 30233N1 TRANSFUSION OF NONAUTOLOGOUS RED BLOOD CELLS INTO PERIPHERAL VEIN, PERCUTANEOUS APPROACH: ICD-10-PCS | Performed by: INTERNAL MEDICINE

## 2025-05-01 PROCEDURE — 82272 OCCULT BLD FECES 1-3 TESTS: CPT

## 2025-05-01 PROCEDURE — 99285 EMERGENCY DEPT VISIT HI MDM: CPT

## 2025-05-01 PROCEDURE — 84484 ASSAY OF TROPONIN QUANT: CPT

## 2025-05-01 PROCEDURE — 83880 ASSAY OF NATRIURETIC PEPTIDE: CPT

## 2025-05-01 PROCEDURE — 85027 COMPLETE CBC AUTOMATED: CPT

## 2025-05-01 PROCEDURE — 80053 COMPREHEN METABOLIC PANEL: CPT

## 2025-05-01 RX ORDER — DIPHENHYDRAMINE HCL 25 MG
25 CAPSULE ORAL EVERY 6 HOURS PRN
Status: DISCONTINUED | OUTPATIENT
Start: 2025-05-01 | End: 2025-05-10 | Stop reason: HOSPADM

## 2025-05-01 RX ORDER — INSULIN GLARGINE 100 [IU]/ML
15 INJECTION, SOLUTION SUBCUTANEOUS NIGHTLY
Status: DISCONTINUED | OUTPATIENT
Start: 2025-05-01 | End: 2025-05-10 | Stop reason: HOSPADM

## 2025-05-01 RX ORDER — OXYCODONE HYDROCHLORIDE 10 MG/1
10 TABLET ORAL EVERY 6 HOURS PRN
Status: DISCONTINUED | OUTPATIENT
Start: 2025-05-01 | End: 2025-05-10 | Stop reason: HOSPADM

## 2025-05-01 RX ORDER — DEXTROSE MONOHYDRATE 100 MG/ML
INJECTION, SOLUTION INTRAVENOUS CONTINUOUS PRN
Status: DISCONTINUED | OUTPATIENT
Start: 2025-05-01 | End: 2025-05-10 | Stop reason: HOSPADM

## 2025-05-01 RX ORDER — GLUCAGON 1 MG/ML
1 KIT INJECTION PRN
Status: DISCONTINUED | OUTPATIENT
Start: 2025-05-01 | End: 2025-05-10 | Stop reason: HOSPADM

## 2025-05-01 RX ORDER — PANTOPRAZOLE SODIUM 40 MG/1
40 TABLET, DELAYED RELEASE ORAL
Status: DISCONTINUED | OUTPATIENT
Start: 2025-05-02 | End: 2025-05-10 | Stop reason: HOSPADM

## 2025-05-01 RX ORDER — SODIUM CHLORIDE 0.9 % (FLUSH) 0.9 %
5-40 SYRINGE (ML) INJECTION PRN
Status: DISCONTINUED | OUTPATIENT
Start: 2025-05-01 | End: 2025-05-10 | Stop reason: HOSPADM

## 2025-05-01 RX ORDER — FERROUS SULFATE 325(65) MG
325 TABLET ORAL
Status: DISCONTINUED | OUTPATIENT
Start: 2025-05-02 | End: 2025-05-05

## 2025-05-01 RX ORDER — ONDANSETRON 4 MG/1
4 TABLET, ORALLY DISINTEGRATING ORAL EVERY 8 HOURS PRN
Status: DISCONTINUED | OUTPATIENT
Start: 2025-05-01 | End: 2025-05-10 | Stop reason: HOSPADM

## 2025-05-01 RX ORDER — LACTULOSE 10 G/15ML
20 SOLUTION ORAL NIGHTLY
Status: DISCONTINUED | OUTPATIENT
Start: 2025-05-01 | End: 2025-05-10 | Stop reason: HOSPADM

## 2025-05-01 RX ORDER — SODIUM CHLORIDE 9 MG/ML
INJECTION, SOLUTION INTRAVENOUS PRN
Status: DISCONTINUED | OUTPATIENT
Start: 2025-05-01 | End: 2025-05-10 | Stop reason: HOSPADM

## 2025-05-01 RX ORDER — ONDANSETRON 2 MG/ML
4 INJECTION INTRAMUSCULAR; INTRAVENOUS EVERY 6 HOURS PRN
Status: DISCONTINUED | OUTPATIENT
Start: 2025-05-01 | End: 2025-05-10 | Stop reason: HOSPADM

## 2025-05-01 RX ORDER — ACETAMINOPHEN 650 MG/1
650 SUPPOSITORY RECTAL EVERY 6 HOURS PRN
Status: DISCONTINUED | OUTPATIENT
Start: 2025-05-01 | End: 2025-05-10 | Stop reason: HOSPADM

## 2025-05-01 RX ORDER — TAMSULOSIN HYDROCHLORIDE 0.4 MG/1
0.4 CAPSULE ORAL DAILY
Status: DISCONTINUED | OUTPATIENT
Start: 2025-05-02 | End: 2025-05-10 | Stop reason: HOSPADM

## 2025-05-01 RX ORDER — METOPROLOL SUCCINATE 25 MG/1
25 TABLET, EXTENDED RELEASE ORAL DAILY
Status: DISCONTINUED | OUTPATIENT
Start: 2025-05-01 | End: 2025-05-03

## 2025-05-01 RX ORDER — SPIRONOLACTONE 25 MG/1
25 TABLET ORAL DAILY
Status: DISCONTINUED | OUTPATIENT
Start: 2025-05-01 | End: 2025-05-04

## 2025-05-01 RX ORDER — PREGABALIN 75 MG/1
75 CAPSULE ORAL 2 TIMES DAILY
Status: DISCONTINUED | OUTPATIENT
Start: 2025-05-01 | End: 2025-05-10 | Stop reason: HOSPADM

## 2025-05-01 RX ORDER — ACETAMINOPHEN 325 MG/1
650 TABLET ORAL EVERY 6 HOURS PRN
Status: DISCONTINUED | OUTPATIENT
Start: 2025-05-01 | End: 2025-05-10 | Stop reason: HOSPADM

## 2025-05-01 RX ORDER — POTASSIUM CHLORIDE 7.45 MG/ML
10 INJECTION INTRAVENOUS
Status: DISPENSED | OUTPATIENT
Start: 2025-05-02 | End: 2025-05-02

## 2025-05-01 RX ORDER — SODIUM CHLORIDE 9 MG/ML
INJECTION, SOLUTION INTRAVENOUS PRN
Status: COMPLETED | OUTPATIENT
Start: 2025-05-01 | End: 2025-05-01

## 2025-05-01 RX ORDER — BUDESONIDE AND FORMOTEROL FUMARATE DIHYDRATE 160; 4.5 UG/1; UG/1
2 AEROSOL RESPIRATORY (INHALATION)
Status: DISCONTINUED | OUTPATIENT
Start: 2025-05-01 | End: 2025-05-10 | Stop reason: HOSPADM

## 2025-05-01 RX ORDER — SODIUM CHLORIDE 0.9 % (FLUSH) 0.9 %
5-40 SYRINGE (ML) INJECTION EVERY 12 HOURS SCHEDULED
Status: DISCONTINUED | OUTPATIENT
Start: 2025-05-01 | End: 2025-05-10 | Stop reason: HOSPADM

## 2025-05-01 RX ORDER — LACTULOSE 10 G/15ML
20 SOLUTION ORAL NIGHTLY
Status: ON HOLD | COMMUNITY
End: 2025-05-09 | Stop reason: HOSPADM

## 2025-05-01 RX ORDER — INSULIN LISPRO 100 [IU]/ML
0-8 INJECTION, SOLUTION INTRAVENOUS; SUBCUTANEOUS
Status: DISCONTINUED | OUTPATIENT
Start: 2025-05-01 | End: 2025-05-10 | Stop reason: HOSPADM

## 2025-05-01 RX ADMIN — HYDROMORPHONE HYDROCHLORIDE 0.5 MG: 1 INJECTION, SOLUTION INTRAMUSCULAR; INTRAVENOUS; SUBCUTANEOUS at 17:09

## 2025-05-01 RX ADMIN — INSULIN GLARGINE 15 UNITS: 100 INJECTION, SOLUTION SUBCUTANEOUS at 20:49

## 2025-05-01 RX ADMIN — HYDROMORPHONE HYDROCHLORIDE 0.5 MG: 0.5 INJECTION, SOLUTION INTRAMUSCULAR; INTRAVENOUS; SUBCUTANEOUS at 12:40

## 2025-05-01 RX ADMIN — PREGABALIN 75 MG: 75 CAPSULE ORAL at 20:33

## 2025-05-01 RX ADMIN — SPIRONOLACTONE 25 MG: 25 TABLET ORAL at 19:20

## 2025-05-01 RX ADMIN — METOPROLOL SUCCINATE 25 MG: 25 TABLET, FILM COATED, EXTENDED RELEASE ORAL at 19:20

## 2025-05-01 RX ADMIN — SODIUM CHLORIDE, PRESERVATIVE FREE 10 ML: 5 INJECTION INTRAVENOUS at 20:34

## 2025-05-01 RX ADMIN — Medication 2 PUFF: at 21:36

## 2025-05-01 RX ADMIN — SODIUM CHLORIDE: 0.9 INJECTION, SOLUTION INTRAVENOUS at 15:53

## 2025-05-01 RX ADMIN — OXYCODONE HYDROCHLORIDE 10 MG: 10 TABLET ORAL at 20:31

## 2025-05-01 RX ADMIN — Medication 3 MG: at 20:33

## 2025-05-01 ASSESSMENT — PAIN SCALES - GENERAL
PAINLEVEL_OUTOF10: 8
PAINLEVEL_OUTOF10: 8
PAINLEVEL_OUTOF10: 3
PAINLEVEL_OUTOF10: 8
PAINLEVEL_OUTOF10: 5
PAINLEVEL_OUTOF10: 7
PAINLEVEL_OUTOF10: 4
PAINLEVEL_OUTOF10: 4
PAINLEVEL_OUTOF10: 5

## 2025-05-01 ASSESSMENT — PAIN - FUNCTIONAL ASSESSMENT
PAIN_FUNCTIONAL_ASSESSMENT: 0-10
PAIN_FUNCTIONAL_ASSESSMENT: NONE - DENIES PAIN

## 2025-05-01 ASSESSMENT — PAIN DESCRIPTION - LOCATION
LOCATION: ABDOMEN;BACK
LOCATION: ABDOMEN
LOCATION: ABDOMEN;BACK
LOCATION: ABDOMEN

## 2025-05-01 ASSESSMENT — PAIN DESCRIPTION - DESCRIPTORS
DESCRIPTORS: ACHING
DESCRIPTORS: ACHING;SHOOTING

## 2025-05-01 ASSESSMENT — PAIN DESCRIPTION - PAIN TYPE: TYPE: CHRONIC PAIN

## 2025-05-01 ASSESSMENT — PAIN DESCRIPTION - ORIENTATION
ORIENTATION: LOWER
ORIENTATION: RIGHT;MID;LOWER

## 2025-05-01 NOTE — DISCHARGE INSTRUCTIONS
Thank you for choosing our Emergency Department for your care.  It is our privilege to care for you in your time of need.  In the next several days, you may receive a survey via email or mailed to your home about your experience with our team.  We would greatly appreciate you taking a few minutes to complete the survey, as we use this information to learn what we have done well and what we could be doing better. Thank you for trusting us with your care!    Below you will find a list of your tests from today's visit.   Labs and Radiology Studies  Recent Results (from the past 12 hours)   Brain Natriuretic Peptide    Collection Time: 05/01/25 11:37 AM   Result Value Ref Range    NT Pro-BNP 4,185 (H) <125 pg/mL   Comprehensive Metabolic Panel    Collection Time: 05/01/25 11:37 AM   Result Value Ref Range    Sodium 136 136 - 145 mmol/L    Potassium 3.3 (L) 3.5 - 5.1 mmol/L    Chloride 101 97 - 108 mmol/L    CO2 28 21 - 32 mmol/L    Anion Gap 7 2 - 12 mmol/L    Glucose 222 (H) 65 - 100 mg/dL    BUN 6 6 - 20 mg/dL    Creatinine 0.70 0.70 - 1.30 mg/dL    BUN/Creatinine Ratio 9 (L) 12 - 20      Est, Glom Filt Rate >90 >60 ml/min/1.73m2    Calcium 7.0 (L) 8.5 - 10.1 mg/dL    Total Bilirubin 0.3 0.2 - 1.0 mg/dL    AST 23 15 - 37 U/L    ALT 8 (L) 12 - 78 U/L    Alk Phosphatase 113 45 - 117 U/L    Total Protein 7.1 6.4 - 8.2 g/dL    Albumin 1.6 (L) 3.5 - 5.0 g/dL    Globulin 5.5 (H) 2.0 - 4.0 g/dL    Albumin/Globulin Ratio 0.3 (L) 1.1 - 2.2     TYPE AND SCREEN    Collection Time: 05/01/25 11:52 AM   Result Value Ref Range    Crossmatch expiration date 05/04/2025,7361     ABO/Rh A Positive     Antibody Screen Negative     Unit Number [id number]     Product Code Blood Bank RC LR     Unit Divison 00     Dispense Status Blood Bank Issued     Unit Issue Date/Time [id number]     Product Code Blood Bank [id number]     Blood Bank Unit Type and Rh A POS     Blood Bank ISBT Product Blood Type 6200     Blood Bank Blood Product  Expiration Date 727785443600     Transfusion Status Ok to transfuse     Crossmatch Result Compatible    Occult Blood, Fecal    Collection Time: 05/01/25 11:53 AM   Result Value Ref Range    Occult Blood, Stool #1 Negative Negative      Date 5,012,025     CBC    Collection Time: 05/01/25 12:04 PM   Result Value Ref Range    WBC 9.4 4.1 - 11.1 K/uL    RBC 2.28 (L) 4.10 - 5.70 M/uL    Hemoglobin 6.3 (L) 12.1 - 17.0 g/dL    Hematocrit 20.4 (L) 36.6 - 50.3 %    MCV 89.5 80.0 - 99.0 FL    MCH 27.6 26.0 - 34.0 PG    MCHC 30.9 30.0 - 36.5 g/dL    RDW 16.8 (H) 11.5 - 14.5 %    Platelets 318 150 - 400 K/uL    MPV 9.9 8.9 - 12.9 FL    Nucleated RBCs 0.0 0.0  WBC    nRBC 0.00 0.00 - 0.01 K/uL   Automated Differential    Collection Time: 05/01/25 12:04 PM   Result Value Ref Range    Neutrophils % 72.3 32.0 - 75.0 %    Lymphocytes % 13.9 12.0 - 49.0 %    Monocytes % 10.4 5.0 - 13.0 %    Eosinophils % 2.2 0.0 - 7.0 %    Basophils % 0.3 0.0 - 1.0 %    Immature Granulocytes % 0.9 (H) 0 - 0.5 %    Neutrophils Absolute 6.79 1.80 - 8.00 K/UL    Lymphocytes Absolute 1.31 0.80 - 3.50 K/UL    Monocytes Absolute 0.98 0.00 - 1.00 K/UL    Eosinophils Absolute 0.21 0.00 - 0.40 K/UL    Basophils Absolute 0.03 0.00 - 0.10 K/UL    Immature Granulocytes Absolute 0.08 (H) 0.00 - 0.04 K/UL    Differential Type AUTOMATED       No results found.  ------------------------------------------------------------------------------------------------------------  The evaluation and treatment you received in the Emergency Department were for an urgent problem. It is important that you follow-up with a doctor, nurse practitioner, or physician assistant to:  (1) confirm your diagnosis,  (2) re-evaluation of changes in your illness and treatment, and (3) for ongoing care. Please take your discharge instructions with you when you go to your follow-up appointment.     If you have any problem arranging a follow-up appointment, contact us!  If your symptoms

## 2025-05-01 NOTE — ED NOTES
ED TO INPATIENT SBAR HANDOFF    Patient Name: Santos Laughlin   Preferred Name: Santos  : 1955  70 y.o.   Family/Caregiver Present: no   Code Status Order: Full Code  PO Status: NPO:No  Telemetry Order: No  C-SSRS: Risk of Suicide: No Risk  Sitter no   Restraints:     Sepsis Risk Score Sepsis V2 Risk Score: 21    Situation  Chief Complaint   Patient presents with    Abdominal Pain     Low hemoglobin     Brief Description of Patient's Condition: Pt is coming from [facility] with a hemoglobin of 6.3.      Pt has a history of liver, gallbladder and bile duct cancer.  Mental Status: oriented and alert  Arrived from:Home  Imaging:   No orders to display     Abnormal labs:   Abnormal Labs Reviewed   BRAIN NATRIURETIC PEPTIDE - Abnormal; Notable for the following components:       Result Value    NT Pro-BNP 4,185 (*)     All other components within normal limits   COMPREHENSIVE METABOLIC PANEL - Abnormal; Notable for the following components:    Potassium 3.3 (*)     Glucose 222 (*)     BUN/Creatinine Ratio 9 (*)     Calcium 7.0 (*)     ALT 8 (*)     Albumin 1.6 (*)     Globulin 5.5 (*)     Albumin/Globulin Ratio 0.3 (*)     All other components within normal limits   CBC - Abnormal; Notable for the following components:    RBC 2.28 (*)     Hemoglobin 6.3 (*)     Hematocrit 20.4 (*)     RDW 16.8 (*)     All other components within normal limits   AUTOMATED DIFFERENTIAL - Abnormal; Notable for the following components:    Immature Granulocytes % 0.9 (*)     Immature Granulocytes Absolute 0.08 (*)     All other components within normal limits   CBC WITH AUTO DIFFERENTIAL - Abnormal; Notable for the following components:    RBC 2.47 (*)     Hemoglobin 7.0 (*)     Hematocrit 22.7 (*)     RDW 16.7 (*)     All other components within normal limits       Background  Allergies:   Allergies   Allergen Reactions    Codeine Anxiety    Pcn [Penicillins] Anxiety     Tolerated cefepime 3/2025     History:   Past  Medical History:   Diagnosis Date    Chronic back pain     Chronic pancreatitis (HCC)     Chronic prescription opiate use 03/28/2025    COPD (chronic obstructive pulmonary disease) (HCC)     Diabetes mellitus (HCC)     Ex-smoker     History of alcohol abuse     Not currently, many years ago    Hypertension     Presence of pancreatic duct stent        Assessment  Vitals: MEWS Score: 2  Level of Consciousness: Alert (0)   Vitals:    05/01/25 1533 05/01/25 1550 05/01/25 1617 05/01/25 1711   BP: (!) 160/86 (!) 166/94 (!) 166/102 (!) 183/113   Pulse: (!) 102 (!) 103 (!) 110 (!) 115   Resp: 20 18 22 18   Temp: 98.1 °F (36.7 °C) 98.2 °F (36.8 °C)     TempSrc:  Oral     SpO2: 99% 100%  99%   Weight:       Height:         Deterioration Index (DI): Deterioration Index: 35.86  Deterioration Index (DI) Interventions Performed:    O2 Flow Rate:    O2 Device: O2 Device: None (Room air)  Cardiac Rhythm: Cardiac Rhythm: Atrial fibrillation  Critical Lab Results: [unfilled]  Cultures: Cultures:None  NIH Score: NIH NIH Stroke Scale  NIH Stroke Scale Assessed: No   Active LDA's:   Peripheral IV 05/01/25 Right Forearm (Active)   Site Assessment Clean, dry & intact 05/01/25 1154   Line Status Blood return noted 05/01/25 1154   Line Care Line pulled back 05/01/25 1154     Active Central Lines:                          Active Wounds:    Active Blanco's:    Active Feeding Tubes:      Administered Medications:   Medications   budesonide-formoterol (SYMBICORT) 160-4.5 MCG/ACT inhaler 2 puff (has no administration in time range)   lipase-protease-amylase (ZENPEP) delayed release capsule 5,000 Units (has no administration in time range)   lactulose (CHRONULAC) 10 GM/15ML solution 20 g (has no administration in time range)   insulin glargine (LANTUS;BASAGLAR) injection pen 15 Units (has no administration in time range)   melatonin tablet 3 mg (has no administration in time range)   metoprolol succinate (TOPROL XL) extended release tablet 25 mg (has

## 2025-05-01 NOTE — ED PROVIDER NOTES
[facility] EMERGENCY DEPT  EMERGENCY DEPARTMENT HISTORY AND PHYSICAL EXAM      Date of evaluation: 5/1/2025  Patient Name: Santos Laughlin  Birthdate 1955  MRN: [id number]  ED Provider: Justin Duarte DO   Note Started: 11:21 AM EDT 5/1/25    HISTORY OF PRESENT ILLNESS     Chief Complaint   Patient presents with    Abdominal Pain     Low hemoglobin     History Provided By: Patient    HPI: 70-year-old male with a past medical history of chronic pancreatitis, diabetes, COPD, hypertension, and chronic opioid use who presents with chronic abdominal pain and anemia. He was transferred from a facility for evaluation of a hemoglobin of 6.3. He denies melena or hematochezia. He has a history of esophageal varices and pancreatic duct stenting. No new complaints aside from baseline abdominal pain.    PAST MEDICAL HISTORY   Past Medical History:  Past Medical History:   Diagnosis Date    Chronic back pain     Chronic pancreatitis (HCC)     Chronic prescription opiate use 03/28/2025    COPD (chronic obstructive pulmonary disease) (HCC)     Diabetes mellitus (HCC)     Ex-smoker     History of alcohol abuse     Not currently, many years ago    Hypertension     Presence of pancreatic duct stent        Past Surgical History:  Past Surgical History:   Procedure Laterality Date    UPPER GASTROINTESTINAL ENDOSCOPY N/A 4/2/2025    ESOPHAGOGASTRODUODENOSCOPY performed by Sravan Starkey MD at [facility] ENDOSCOPY    UPPER GASTROINTESTINAL ENDOSCOPY N/A 4/2/2025    ENDOSCOPIC ULTRASOUND performed by Sravan Starkey MD at [facility] ENDOSCOPY       Family History:  Family History   Family history unknown: Yes       Social History:  Social History     Tobacco Use    Smoking status: Never    Smokeless tobacco: Never   Substance Use Topics    Alcohol use: Yes    Drug use: Never       Allergies:  Allergies   Allergen Reactions    Codeine Anxiety    Pcn [Penicillins] Anxiety     Tolerated cefepime 3/2025       PCP: Nitish Dela Cruz DO    Current Meds:   Current  mg (10 mg Oral Given 5/3/25 0920)   pregabalin (LYRICA) capsule 75 mg (75 mg Oral Given 5/3/25 0926)   spironolactone (ALDACTONE) tablet 25 mg (25 mg Oral Given 5/3/25 0920)   tamsulosin (FLOMAX) capsule 0.4 mg (0.4 mg Oral Given 5/3/25 0920)   ferrous sulfate (IRON 325) tablet 325 mg (325 mg Oral Given 5/3/25 0920)   sodium chloride flush 0.9 % injection 5-40 mL (10 mLs IntraVENous Given 5/3/25 0921)   sodium chloride flush 0.9 % injection 5-40 mL (has no administration in time range)   0.9 % sodium chloride infusion ( IntraVENous New Bag 5/2/25 0027)   ondansetron (ZOFRAN-ODT) disintegrating tablet 4 mg (has no administration in time range)     Or   ondansetron (ZOFRAN) injection 4 mg (has no administration in time range)   acetaminophen (TYLENOL) tablet 650 mg (650 mg Oral Given 5/2/25 1837)     Or   acetaminophen (TYLENOL) suppository 650 mg ( Rectal See Alternative 5/2/25 1837)   insulin lispro (HUMALOG,ADMELOG) injection vial 0-8 Units ( SubCUTAneous Not Given 5/3/25 1226)   glucose chewable tablet 16 g (has no administration in time range)   dextrose bolus 10% 125 mL (has no administration in time range)     Or   dextrose bolus 10% 250 mL (has no administration in time range)   glucagon injection 1 mg (has no administration in time range)   dextrose 10 % infusion (has no administration in time range)   potassium chloride 10 mEq/100 mL IVPB (Peripheral Line) (10 mEq IntraVENous Not Given 5/2/25 0533)   diphenhydrAMINE (BENADRYL) capsule 25 mg (25 mg Oral Given 5/3/25 0920)   furosemide (LASIX) tablet 20 mg (20 mg Oral Given 5/3/25 0920)   metoprolol succinate (TOPROL XL) extended release tablet 25 mg (has no administration in time range)   metoprolol tartrate (LOPRESSOR) tablet 25 mg (has no administration in time range)   HYDROmorphone (DILAUDID) injection 0.5 mg (0.5 mg IntraVENous Given 5/1/25 1240)   0.9 % sodium chloride infusion (0 mL/hr IntraVENous Stopped 5/1/25 0298)   HYDROmorphone (DILAUDID)

## 2025-05-01 NOTE — CONSENT
Informed Consent for Blood Component Transfusion Note    I have discussed with the patient the rationale for blood component transfusion; its benefits in treating or preventing fatigue, organ damage, or death; and its risk which includes mild transfusion reactions, rare risk of blood borne infection, or more serious but rare reactions. I have discussed the alternatives to transfusion, including the risk and consequences of not receiving transfusion. The patient had an opportunity to ask questions and had agreed to proceed with transfusion of blood components.    Electronically signed by Justin Duarte DO on 5/1/25 at 1:33 PM EDT

## 2025-05-01 NOTE — ED TRIAGE NOTES
Pt is coming from [facility] [place] with a hemoglobin of 6.3.     Pt has a history of liver, gallbladder and bile duct cancer.

## 2025-05-01 NOTE — H&P
Hospitalist History & Physical Notes.           [facility].              Name : Santos Laughlin      MRN number : [id number]     YOB: 1955     Subjective :   Chief Complaint : Hemoglobin of 6.3 with the chronic anemia.     Source of information : Patient not a very good historian but able to provide information.  ED provider and reviewed previous records.    History of present illness:   Santos Laughlin is  70 y.o. male with below mentioned past medical history with history of alcohol abuse and cirrhosis liver but no esophageal varices as previous EGD presents to the emergency room complaining of not feeling good.  He was found with a hemoglobin of 6.3 at nursing home, sent here for further evaluation.  At baseline he is anemic with a hemoglobin about 8, ordered a unit of blood transfusion in the emergency room already.    States appetite is good, denies chest pain, palpitations.  Denies any abdominal pain or change in bowel movements.  He is asking for Benadryl for itching.  Has chronic pain on oxycodone.    Past Medical History:   Diagnosis Date    Chronic back pain     Chronic pancreatitis (HCC)     Chronic prescription opiate use 03/28/2025    COPD (chronic obstructive pulmonary disease) (HCC)     Diabetes mellitus (HCC)     Ex-smoker     History of alcohol abuse     Not currently, many years ago    Hypertension     Presence of pancreatic duct stent      Past Surgical History:   Procedure Laterality Date    UPPER GASTROINTESTINAL ENDOSCOPY N/A 4/2/2025    ESOPHAGOGASTRODUODENOSCOPY performed by Sravan Starkey MD at [facility] ENDOSCOPY    UPPER GASTROINTESTINAL ENDOSCOPY N/A 4/2/2025    ENDOSCOPIC ULTRASOUND performed by Sravan Starkey MD at [facility] ENDOSCOPY     Family History   Family history unknown: Yes      Social History     Tobacco Use    Smoking status: Never    Smokeless tobacco: Never   Substance Use Topics    Alcohol use: Yes       Allergies   Allergen Reactions    Codeine Anxiety    Pcn  4.0 g/dL    Albumin/Globulin Ratio 0.3 (L) 1.1 - 2.2     TYPE AND SCREEN    Collection Time: 05/01/25 11:52 AM   Result Value Ref Range    Crossmatch expiration date 05/04/2025,1952     ABO/Rh A Positive     Antibody Screen Negative     Unit Number [id number]     Product Code Blood Bank RC LR     Unit Divison 00     Dispense Status Blood Bank Issued     Unit Issue Date/Time [id number]     Product Code Blood Bank [id number]     Blood Bank Unit Type and Rh A POS     Blood Bank ISBT Product Blood Type 6200     Blood Bank Blood Product Expiration Date [id number]     Transfusion Status Ok to transfuse     Crossmatch Result Compatible    Occult Blood, Fecal    Collection Time: 05/01/25 11:53 AM   Result Value Ref Range    Occult Blood, Stool #1 Negative Negative      Date 5,012,025     CBC    Collection Time: 05/01/25 12:04 PM   Result Value Ref Range    WBC 9.4 4.1 - 11.1 K/uL    RBC 2.28 (L) 4.10 - 5.70 M/uL    Hemoglobin 6.3 (L) 12.1 - 17.0 g/dL    Hematocrit 20.4 (L) 36.6 - 50.3 %    MCV 89.5 80.0 - 99.0 FL    MCH 27.6 26.0 - 34.0 PG    MCHC 30.9 30.0 - 36.5 g/dL    RDW 16.8 (H) 11.5 - 14.5 %    Platelets 318 150 - 400 K/uL    MPV 9.9 8.9 - 12.9 FL    Nucleated RBCs 0.0 0.0  WBC    nRBC 0.00 0.00 - 0.01 K/uL   Automated Differential    Collection Time: 05/01/25 12:04 PM   Result Value Ref Range    Neutrophils % 72.3 32.0 - 75.0 %    Lymphocytes % 13.9 12.0 - 49.0 %    Monocytes % 10.4 5.0 - 13.0 %    Eosinophils % 2.2 0.0 - 7.0 %    Basophils % 0.3 0.0 - 1.0 %    Immature Granulocytes % 0.9 (H) 0 - 0.5 %    Neutrophils Absolute 6.79 1.80 - 8.00 K/UL    Lymphocytes Absolute 1.31 0.80 - 3.50 K/UL    Monocytes Absolute 0.98 0.00 - 1.00 K/UL    Eosinophils Absolute 0.21 0.00 - 0.40 K/UL    Basophils Absolute 0.03 0.00 - 0.10 K/UL    Immature Granulocytes Absolute 0.08 (H) 0.00 - 0.04 K/UL    Differential Type AUTOMATED         Radiologic Studies :   Imaging:   No orders to display          Assessment and Plan

## 2025-05-02 ENCOUNTER — APPOINTMENT (OUTPATIENT)
Facility: HOSPITAL | Age: 70
DRG: 812 | End: 2025-05-02
Payer: MEDICARE

## 2025-05-02 LAB
ABO + RH BLD: NORMAL
ALBUMIN SERPL-MCNC: 1.7 G/DL (ref 3.5–5)
ANION GAP SERPL CALC-SCNC: 4 MMOL/L (ref 2–12)
BASOPHILS # BLD: 0.03 K/UL (ref 0–0.1)
BASOPHILS NFR BLD: 0.3 % (ref 0–1)
BLD PROD TYP BPU: NORMAL
BLOOD BANK BLOOD PRODUCT EXPIRATION DATE: NORMAL
BLOOD BANK DISPENSE STATUS: NORMAL
BLOOD BANK ISBT PRODUCT BLOOD TYPE: 6200
BLOOD BANK PRODUCT CODE: NORMAL
BLOOD BANK UNIT TYPE AND RH: NORMAL
BLOOD GROUP ANTIBODIES SERPL: NEGATIVE
BPU ID: NORMAL
BUN SERPL-MCNC: 5 MG/DL (ref 6–20)
BUN/CREAT SERPL: 10 (ref 12–20)
CA-I BLD-MCNC: 7.1 MG/DL (ref 8.5–10.1)
CHLORIDE SERPL-SCNC: 102 MMOL/L (ref 97–108)
CO2 SERPL-SCNC: 30 MMOL/L (ref 21–32)
CREAT SERPL-MCNC: 0.52 MG/DL (ref 0.7–1.3)
CROSSMATCH RESULT: NORMAL
DIFFERENTIAL METHOD BLD: ABNORMAL
ECHO AO ASC DIAM: 3.9 CM
ECHO AO ASCENDING AORTA INDEX: 1.72 CM/M2
ECHO AO ROOT DIAM: 3.2 CM
ECHO AO ROOT INDEX: 1.41 CM/M2
ECHO AV AREA PEAK VELOCITY: 2.1 CM2
ECHO AV AREA VTI: 1.7 CM2
ECHO AV AREA/BSA PEAK VELOCITY: 0.9 CM2/M2
ECHO AV AREA/BSA VTI: 0.7 CM2/M2
ECHO AV MEAN GRADIENT: 5 MMHG
ECHO AV MEAN VELOCITY: 1.1 M/S
ECHO AV PEAK GRADIENT: 9 MMHG
ECHO AV PEAK VELOCITY: 1.5 M/S
ECHO AV VELOCITY RATIO: 0.67
ECHO AV VTI: 26 CM
ECHO BSA: 2.35 M2
ECHO EST RA PRESSURE: 8 MMHG
ECHO IVC EXP: 2.8 CM
ECHO LA AREA 4C: 24 CM2
ECHO LA DIAMETER INDEX: 2.16 CM/M2
ECHO LA DIAMETER: 4.9 CM
ECHO LA MAJOR AXIS: 6.7 CM
ECHO LA TO AORTIC ROOT RATIO: 1.53
ECHO LA VOL MOD A4C: 70 ML (ref 18–58)
ECHO LA VOLUME INDEX MOD A4C: 31 ML/M2 (ref 16–34)
ECHO LV EDV A2C: 70 ML
ECHO LV EDV A4C: 78 ML
ECHO LV EDV INDEX A4C: 34 ML/M2
ECHO LV EDV NDEX A2C: 31 ML/M2
ECHO LV EF PHYSICIAN: 60 %
ECHO LV EJECTION FRACTION A2C: 65 %
ECHO LV EJECTION FRACTION A4C: 59 %
ECHO LV EJECTION FRACTION BIPLANE: 62 % (ref 55–100)
ECHO LV ESV A2C: 25 ML
ECHO LV ESV A4C: 32 ML
ECHO LV ESV INDEX A2C: 11 ML/M2
ECHO LV ESV INDEX A4C: 14 ML/M2
ECHO LV FRACTIONAL SHORTENING: 30 % (ref 28–44)
ECHO LV INTERNAL DIMENSION DIASTOLE INDEX: 2.07 CM/M2
ECHO LV INTERNAL DIMENSION DIASTOLIC: 4.7 CM (ref 4.2–5.9)
ECHO LV INTERNAL DIMENSION SYSTOLIC INDEX: 1.45 CM/M2
ECHO LV INTERNAL DIMENSION SYSTOLIC: 3.3 CM
ECHO LV IVSD: 1.6 CM (ref 0.6–1)
ECHO LV MASS 2D: 294.1 G (ref 88–224)
ECHO LV MASS INDEX 2D: 129.5 G/M2 (ref 49–115)
ECHO LV POSTERIOR WALL DIASTOLIC: 1.4 CM (ref 0.6–1)
ECHO LV RELATIVE WALL THICKNESS RATIO: 0.6
ECHO LVOT AREA: 3.1 CM2
ECHO LVOT AV VTI INDEX: 0.55
ECHO LVOT DIAM: 2 CM
ECHO LVOT MEAN GRADIENT: 2 MMHG
ECHO LVOT PEAK GRADIENT: 4 MMHG
ECHO LVOT PEAK VELOCITY: 1 M/S
ECHO LVOT STROKE VOLUME INDEX: 19.8 ML/M2
ECHO LVOT SV: 44.9 ML
ECHO LVOT VTI: 14.3 CM
ECHO MV AREA VTI: 1.8 CM2
ECHO MV LVOT VTI INDEX: 1.76
ECHO MV MAX VELOCITY: 1.4 M/S
ECHO MV MEAN GRADIENT: 3 MMHG
ECHO MV MEAN VELOCITY: 0.8 M/S
ECHO MV PEAK GRADIENT: 8 MMHG
ECHO MV REGURGITANT PEAK GRADIENT: 71 MMHG
ECHO MV REGURGITANT PEAK VELOCITY: 4.2 M/S
ECHO MV VTI: 25.2 CM
ECHO PV ACCELERATION TIME (AT): 82 MS
ECHO PV MAX VELOCITY: 1 M/S
ECHO PV PEAK GRADIENT: 4 MMHG
ECHO RA AREA 4C: 19.5 CM2
ECHO RA END SYSTOLIC VOLUME APICAL 4 CHAMBER INDEX BSA: 24 ML/M2
ECHO RA VOLUME: 54 ML
ECHO RIGHT VENTRICULAR SYSTOLIC PRESSURE (RVSP): 31 MMHG
ECHO RV BASAL DIMENSION: 4.6 CM
ECHO RV TAPSE: 1.8 CM (ref 1.7–?)
ECHO TV REGURGITANT MAX VELOCITY: 2.41 M/S
ECHO TV REGURGITANT PEAK GRADIENT: 23 MMHG
EKG ATRIAL RATE: 81 BPM
EKG DIAGNOSIS: NORMAL
EKG Q-T INTERVAL: 336 MS
EKG QRS DURATION: 80 MS
EKG QTC CALCULATION (BAZETT): 444 MS
EKG R AXIS: -24 DEGREES
EKG T AXIS: 115 DEGREES
EKG VENTRICULAR RATE: 105 BPM
EOSINOPHIL # BLD: 0.33 K/UL (ref 0–0.4)
EOSINOPHIL NFR BLD: 3.7 % (ref 0–7)
ERYTHROCYTE [DISTWIDTH] IN BLOOD BY AUTOMATED COUNT: 17.1 % (ref 11.5–14.5)
EST. AVERAGE GLUCOSE BLD GHB EST-MCNC: 126 MG/DL
FERRITIN SERPL-MCNC: 325 NG/ML (ref 26–388)
FOLATE SERPL-MCNC: 8.9 NG/ML (ref 5–21)
GLUCOSE BLD STRIP.AUTO-MCNC: 108 MG/DL (ref 65–100)
GLUCOSE BLD STRIP.AUTO-MCNC: 216 MG/DL (ref 65–100)
GLUCOSE BLD STRIP.AUTO-MCNC: 299 MG/DL (ref 65–100)
GLUCOSE BLD STRIP.AUTO-MCNC: 82 MG/DL (ref 65–100)
GLUCOSE SERPL-MCNC: 93 MG/DL (ref 65–100)
HBA1C MFR BLD: 6 % (ref 4–5.6)
HCT VFR BLD AUTO: 24.8 % (ref 36.6–50.3)
HGB BLD-MCNC: 7.4 G/DL (ref 12.1–17)
IMM GRANULOCYTES # BLD AUTO: 0.04 K/UL (ref 0–0.04)
IMM GRANULOCYTES NFR BLD AUTO: 0.4 % (ref 0–0.5)
INR PPP: 1.6 (ref 0.9–1.1)
IRON SATN MFR SERPL: 28 % (ref 20–50)
IRON SERPL-MCNC: 21 UG/DL (ref 35–150)
LYMPHOCYTES # BLD: 1.31 K/UL (ref 0.8–3.5)
LYMPHOCYTES NFR BLD: 14.5 % (ref 12–49)
MCH RBC QN AUTO: 27.6 PG (ref 26–34)
MCHC RBC AUTO-ENTMCNC: 29.8 G/DL (ref 30–36.5)
MCV RBC AUTO: 92.5 FL (ref 80–99)
MONOCYTES # BLD: 0.81 K/UL (ref 0–1)
MONOCYTES NFR BLD: 9 % (ref 5–13)
NEUTS SEG # BLD: 6.52 K/UL (ref 1.8–8)
NEUTS SEG NFR BLD: 72.1 % (ref 32–75)
NRBC # BLD: 0 K/UL (ref 0–0.01)
NRBC BLD-RTO: 0 PER 100 WBC
PERFORMED BY:: ABNORMAL
PERFORMED BY:: NORMAL
PHOSPHATE SERPL-MCNC: 2.9 MG/DL (ref 2.6–4.7)
PLATELET # BLD AUTO: 346 K/UL (ref 150–400)
PMV BLD AUTO: 9.7 FL (ref 8.9–12.9)
POTASSIUM SERPL-SCNC: 3.3 MMOL/L (ref 3.5–5.1)
PROTHROMBIN TIME: 19.4 SEC (ref 11.9–14.6)
RBC # BLD AUTO: 2.68 M/UL (ref 4.1–5.7)
SODIUM SERPL-SCNC: 136 MMOL/L (ref 136–145)
SPECIMEN EXP DATE BLD: NORMAL
TIBC SERPL-MCNC: 74 UG/DL (ref 250–450)
TRANSFUSION STATUS PATIENT QL: NORMAL
UNIT DIVISION: 0
UNIT ISSUE DATE/TIME: NORMAL
VIT B12 SERPL-MCNC: 696 PG/ML (ref 193–986)
WBC # BLD AUTO: 9 K/UL (ref 4.1–11.1)

## 2025-05-02 PROCEDURE — 6370000000 HC RX 637 (ALT 250 FOR IP): Performed by: PHYSICIAN ASSISTANT

## 2025-05-02 PROCEDURE — 85025 COMPLETE CBC W/AUTO DIFF WBC: CPT

## 2025-05-02 PROCEDURE — 82746 ASSAY OF FOLIC ACID SERUM: CPT

## 2025-05-02 PROCEDURE — 80069 RENAL FUNCTION PANEL: CPT

## 2025-05-02 PROCEDURE — 6370000000 HC RX 637 (ALT 250 FOR IP): Performed by: HOSPITALIST

## 2025-05-02 PROCEDURE — 94761 N-INVAS EAR/PLS OXIMETRY MLT: CPT

## 2025-05-02 PROCEDURE — 82962 GLUCOSE BLOOD TEST: CPT

## 2025-05-02 PROCEDURE — 84443 ASSAY THYROID STIM HORMONE: CPT

## 2025-05-02 PROCEDURE — 6360000002 HC RX W HCPCS: Performed by: PHYSICIAN ASSISTANT

## 2025-05-02 PROCEDURE — 83540 ASSAY OF IRON: CPT

## 2025-05-02 PROCEDURE — 82607 VITAMIN B-12: CPT

## 2025-05-02 PROCEDURE — 36415 COLL VENOUS BLD VENIPUNCTURE: CPT

## 2025-05-02 PROCEDURE — 6370000000 HC RX 637 (ALT 250 FOR IP): Performed by: STUDENT IN AN ORGANIZED HEALTH CARE EDUCATION/TRAINING PROGRAM

## 2025-05-02 PROCEDURE — 94640 AIRWAY INHALATION TREATMENT: CPT

## 2025-05-02 PROCEDURE — 85610 PROTHROMBIN TIME: CPT

## 2025-05-02 PROCEDURE — 83036 HEMOGLOBIN GLYCOSYLATED A1C: CPT

## 2025-05-02 PROCEDURE — 82728 ASSAY OF FERRITIN: CPT

## 2025-05-02 PROCEDURE — 1100000000 HC RM PRIVATE

## 2025-05-02 PROCEDURE — 93321 DOPPLER ECHO F-UP/LMTD STD: CPT

## 2025-05-02 PROCEDURE — 2500000003 HC RX 250 WO HCPCS: Performed by: HOSPITALIST

## 2025-05-02 PROCEDURE — 93010 ELECTROCARDIOGRAM REPORT: CPT | Performed by: SPECIALIST

## 2025-05-02 PROCEDURE — 84439 ASSAY OF FREE THYROXINE: CPT

## 2025-05-02 PROCEDURE — 2580000003 HC RX 258: Performed by: HOSPITALIST

## 2025-05-02 RX ORDER — FUROSEMIDE 40 MG/1
20 TABLET ORAL DAILY
Status: DISCONTINUED | OUTPATIENT
Start: 2025-05-02 | End: 2025-05-03

## 2025-05-02 RX ORDER — POTASSIUM CHLORIDE 1500 MG/1
40 TABLET, EXTENDED RELEASE ORAL ONCE
Status: COMPLETED | OUTPATIENT
Start: 2025-05-02 | End: 2025-05-02

## 2025-05-02 RX ADMIN — PREGABALIN 75 MG: 75 CAPSULE ORAL at 21:03

## 2025-05-02 RX ADMIN — SPIRONOLACTONE 25 MG: 25 TABLET ORAL at 10:43

## 2025-05-02 RX ADMIN — OXYCODONE HYDROCHLORIDE 10 MG: 10 TABLET ORAL at 03:37

## 2025-05-02 RX ADMIN — PANCRELIPASE LIPASE, PANCRELIPASE PROTEASE, PANCRELIPASE AMYLASE 5000 UNITS: 5000; 17000; 24000 CAPSULE, DELAYED RELEASE ORAL at 10:43

## 2025-05-02 RX ADMIN — DIPHENHYDRAMINE HYDROCHLORIDE 25 MG: 25 CAPSULE ORAL at 00:05

## 2025-05-02 RX ADMIN — DIPHENHYDRAMINE HYDROCHLORIDE 25 MG: 25 CAPSULE ORAL at 18:37

## 2025-05-02 RX ADMIN — SODIUM CHLORIDE, PRESERVATIVE FREE 10 ML: 5 INJECTION INTRAVENOUS at 10:49

## 2025-05-02 RX ADMIN — METOPROLOL SUCCINATE 25 MG: 25 TABLET, FILM COATED, EXTENDED RELEASE ORAL at 10:43

## 2025-05-02 RX ADMIN — Medication 3 MG: at 21:03

## 2025-05-02 RX ADMIN — POTASSIUM CHLORIDE 10 MEQ: 7.46 INJECTION, SOLUTION INTRAVENOUS at 03:51

## 2025-05-02 RX ADMIN — DIPHENHYDRAMINE HYDROCHLORIDE 25 MG: 25 CAPSULE ORAL at 10:43

## 2025-05-02 RX ADMIN — FUROSEMIDE 20 MG: 40 TABLET ORAL at 16:20

## 2025-05-02 RX ADMIN — INSULIN LISPRO 2 UNITS: 100 INJECTION, SOLUTION INTRAVENOUS; SUBCUTANEOUS at 13:11

## 2025-05-02 RX ADMIN — POTASSIUM CHLORIDE 10 MEQ: 7.46 INJECTION, SOLUTION INTRAVENOUS at 01:44

## 2025-05-02 RX ADMIN — POTASSIUM CHLORIDE 40 MEQ: 1500 TABLET, EXTENDED RELEASE ORAL at 13:11

## 2025-05-02 RX ADMIN — PANTOPRAZOLE SODIUM 40 MG: 40 TABLET, DELAYED RELEASE ORAL at 10:43

## 2025-05-02 RX ADMIN — PANCRELIPASE LIPASE, PANCRELIPASE PROTEASE, PANCRELIPASE AMYLASE 5000 UNITS: 5000; 17000; 24000 CAPSULE, DELAYED RELEASE ORAL at 16:21

## 2025-05-02 RX ADMIN — LACTULOSE 20 G: 20 SOLUTION ORAL at 21:03

## 2025-05-02 RX ADMIN — POTASSIUM CHLORIDE 10 MEQ: 7.46 INJECTION, SOLUTION INTRAVENOUS at 00:26

## 2025-05-02 RX ADMIN — INSULIN GLARGINE 15 UNITS: 100 INJECTION, SOLUTION SUBCUTANEOUS at 21:03

## 2025-05-02 RX ADMIN — ACETAMINOPHEN 650 MG: 325 TABLET ORAL at 18:37

## 2025-05-02 RX ADMIN — INSULIN LISPRO 4 UNITS: 100 INJECTION, SOLUTION INTRAVENOUS; SUBCUTANEOUS at 16:20

## 2025-05-02 RX ADMIN — SODIUM CHLORIDE: 0.9 INJECTION, SOLUTION INTRAVENOUS at 00:27

## 2025-05-02 RX ADMIN — TAMSULOSIN HYDROCHLORIDE 0.4 MG: 0.4 CAPSULE ORAL at 10:43

## 2025-05-02 RX ADMIN — OXYCODONE HYDROCHLORIDE 10 MG: 10 TABLET ORAL at 10:43

## 2025-05-02 RX ADMIN — PREGABALIN 75 MG: 75 CAPSULE ORAL at 10:43

## 2025-05-02 RX ADMIN — OXYCODONE HYDROCHLORIDE 10 MG: 10 TABLET ORAL at 18:37

## 2025-05-02 RX ADMIN — Medication 2 PUFF: at 21:50

## 2025-05-02 RX ADMIN — SODIUM CHLORIDE, PRESERVATIVE FREE 10 ML: 5 INJECTION INTRAVENOUS at 21:04

## 2025-05-02 RX ADMIN — Medication 2 PUFF: at 09:43

## 2025-05-02 RX ADMIN — PANCRELIPASE LIPASE, PANCRELIPASE PROTEASE, PANCRELIPASE AMYLASE 5000 UNITS: 5000; 17000; 24000 CAPSULE, DELAYED RELEASE ORAL at 13:11

## 2025-05-02 RX ADMIN — FERROUS SULFATE TAB 325 MG (65 MG ELEMENTAL FE) 325 MG: 325 (65 FE) TAB at 10:43

## 2025-05-02 ASSESSMENT — PAIN SCALES - GENERAL
PAINLEVEL_OUTOF10: 8
PAINLEVEL_OUTOF10: 4
PAINLEVEL_OUTOF10: 8
PAINLEVEL_OUTOF10: 5
PAINLEVEL_OUTOF10: 4
PAINLEVEL_OUTOF10: 8
PAINLEVEL_OUTOF10: 8

## 2025-05-02 ASSESSMENT — PAIN DESCRIPTION - ORIENTATION
ORIENTATION: RIGHT;UPPER
ORIENTATION: LOWER;RIGHT;MID
ORIENTATION: RIGHT;ANTERIOR;POSTERIOR;UPPER
ORIENTATION: LOWER;MID;RIGHT

## 2025-05-02 ASSESSMENT — PAIN SCALES - WONG BAKER
WONGBAKER_NUMERICALRESPONSE: HURTS WHOLE LOT
WONGBAKER_NUMERICALRESPONSE: HURTS LITTLE MORE
WONGBAKER_NUMERICALRESPONSE: HURTS LITTLE MORE

## 2025-05-02 ASSESSMENT — PAIN DESCRIPTION - LOCATION
LOCATION: ABDOMEN
LOCATION: ABDOMEN;BACK
LOCATION: ABDOMEN;BACK
LOCATION: ABDOMEN

## 2025-05-02 ASSESSMENT — PAIN DESCRIPTION - DESCRIPTORS
DESCRIPTORS: ACHING;SHOOTING
DESCRIPTORS: ACHING

## 2025-05-02 NOTE — CARE COORDINATION
05/02/25 1414   Service Assessment   Patient Orientation Alert and Oriented   Cognition Alert   History Provided By Patient   Primary Caregiver Self   Support Systems Children   Patient's Healthcare Decision Maker is: Legal Next of Kin   PCP Verified by CM Yes   Last Visit to PCP Within last 3 months   Prior Functional Level Assistance with the following:;Mobility   Current Functional Level Assistance with the following:;Mobility   Can patient return to prior living arrangement Other (see comment)  (Patient will be relocating to [place], [place] w/his one of his daughter's.)   Ability to make needs known: Good   Family able to assist with home care needs: Yes   Would you like for me to discuss the discharge plan with any other family members/significant others, and if so, who? Yes   Financial Resources Medicare   Community Resources None   Social/Functional History   Lives With Alone   Type of Home House   Home Layout One level   Bathroom Equipment Shower chair   Home Equipment Cane;Rollator;Walker - Rolling   Prior Level of Assist for ADLs Independent   Prior Level of Assist for Homemaking Independent   Ambulation Assistance Needs assistance   Occupation Retired   Discharge Planning   Type of Residence House;Other (Comment)  (Relocating to [place], [place] w/one of his daughter's.)   Living Arrangements Alone       Readmit. Last inpatient admission 3/28/-4/4/25.  Discharged to SNF [facility] [place]. Patient resides in a single level home.  Oldest daughter resides in [place] and checks on him periodically.  Last seen at listed PCP office approx three mos ago.  Utilizes ([address]  No home O2.  DME consists of a shower chair; cane; rollator; and walker-rolling. Independent w/all ADL's & IADL's     Discharge disposition, patient will be relocating to [place], [place] with his youngest daughter.   Patient will not be returning to SNF ([facility] [place]).  Family to  at time of

## 2025-05-02 NOTE — PROGRESS NOTES
Hospitalist Progress Note    NAME:   Santos Laughlin   : 1955   MRN: [id number]     Date/Time: 2025 7:25 AM  Patient PCP: Nitish Dela Cruz DO    Estimated discharge date:24-48hrs  Barriers: stable Hgb, iron studies, BLE duplex, repeat TTE, cardio consult    Hospital course:  Mr. Laughlin is a 70 year old male with hx of chronic pancreatitis, cirrhosis, recently discharged from here with 2 weeks of IV merrem on 2025 for sepsis suspected due to complex hepatic cyst who presented to the emergency department for generalized weakness.  Patient resides at [facility] and was found to have hemoglobin 6.3 subsequently transferred here for further workup and evaluation.    On initial presentation patient hypertensive, tachycardic.  Repeat CBC with hemoglobin 6.3.  1 unit PRBC ordered.  Stool occult negative.  INR 1.6.  CMP with potassium 3.3, renal function with normal limits.  BNP 4285.  Troponin negative. Of note EKG with Afib with RVR. We were asked to admit for further workup and evaluation of acute on chronic anemia. Hgb improved 7.4. Cardiology consulted given new onset Afib. Repeat TTE ordered.     Assessment / Plan:  Acute on chronic anemia  Hgb initially 6.3, now 7.4 status post 1 unit PRBC   Hemoglobin ranging 7.8-6.8 during last admission 1 month ago  Stool occult negative  Iron studies, vitamin B-12 pending  Suspect iron deficiency anemia versus anemia of chronic disease  Continue to monitor H&H  Transfuse for hemoglobin less than 6    Atrial fibrillation, ?newonset  EKG  A-fib, rate 1 5, no acute ischemic changes  No known history, previous EKGs 3/2025 normal sinus rhythm  Continue metoprolol  Check TSH/T4  [id number] of 3  Hold initiating anticoagulation at this time given acute anemia  Last echo 3/2025 with a EF 55 to 60%    Lower extremity pitting edema  Secondary to CHF versus cirrhosis  Repeat TTE  BNP 4195  Start Lasix  Obtain bilateral lower extremity duplex    Chronic  100 % -- --   05/01/25 1116 (!) 158/86 98.3 °F (36.8 °C) Oral 98 17 100 % 1.753 m (5' 9\") 113.4 kg (250 lb)         Intake/Output Summary (Last 24 hours) at 5/2/2025 0725  Last data filed at 5/1/2025 1945  Gross per 24 hour   Intake 778.88 ml   Output 300 ml   Net 478.88 ml        I had a face to face encounter and independently examined this patient on 5/2/2025, as outlined below:    Review of Systems   Constitutional:  Positive for fatigue. Negative for chills and fever.   Eyes:  Negative for visual disturbance.   Respiratory:  Negative for cough and shortness of breath.    Cardiovascular:  Positive for leg swelling. Negative for chest pain and palpitations.   Gastrointestinal:  Positive for abdominal pain and constipation. Negative for diarrhea, nausea and vomiting.   Genitourinary:  Negative for difficulty urinating and dysuria.   Musculoskeletal:  Negative for back pain and neck pain.   Skin:  Negative for rash.   Neurological:  Negative for weakness, numbness and headaches.   Psychiatric/Behavioral:  Negative for confusion.         PHYSICAL EXAM:  Physical Exam  Vitals reviewed.   Constitutional:       General: He is not in acute distress.     Appearance: Normal appearance.   HENT:      Head: Normocephalic and atraumatic.   Eyes:      Extraocular Movements: Extraocular movements intact.      Conjunctiva/sclera: Conjunctivae normal.   Cardiovascular:      Rate and Rhythm: Normal rate. Rhythm irregular.      Heart sounds: Normal heart sounds.   Pulmonary:      Effort: Pulmonary effort is normal. No respiratory distress.      Breath sounds: Normal breath sounds. No wheezing or rales.   Abdominal:      General: Bowel sounds are normal. There is no distension.      Palpations: Abdomen is soft.      Tenderness: There is abdominal tenderness. There is no guarding or rebound.      Comments: Tenderness palpation right upper quadrant epigastric region   Musculoskeletal:         General: Normal range of motion.

## 2025-05-02 NOTE — PROGRESS NOTES
4 Eyes Skin Assessment     NAME:  Santos Laughlin  YOB: 1955  MEDICAL RECORD NUMBER:  [id number]    The patient is being assessed for  Admission    I agree that at least one RN has performed a thorough Head to Toe Skin Assessment on the patient. ALL assessment sites listed below have been assessed.      Areas assessed by both nurses:    Head, Face, Ears, Shoulders, Back, Chest, Arms, Elbows, Hands, Sacrum. Buttock, Coccyx, Ischium, Legs. Feet and Heels, and Under Medical Devices         Does the Patient have a Wound? Yes wound(s) were present on assessment. LDA wound assessment was Initiated and completed by RN       Jhonatan Prevention initiated by RN: Yes  Wound Care Orders initiated by RN: Yes    Pressure Injury (Stage 3,4, Unstageable, DTI, NWPT, and Complex wounds) if present, place Wound referral order by RN under : No    New Ostomies, if present place, Ostomy referral order under : No     Nurse 1 eSignature: Electronically signed by Bella Foster RN on 5/1/25 at 7:50 PM EDT    **SHARE this note so that the co-signing nurse can place an eSignature**    Nurse 2 eSignature: {Esignature:[id number]}

## 2025-05-02 NOTE — PLAN OF CARE
Problem: Chronic Conditions and Co-morbidities  Goal: Patient's chronic conditions and co-morbidity symptoms are monitored and maintained or improved  5/2/2025 1237 by Margarita Brown RN  Outcome: Progressing  Flowsheets (Taken 5/2/2025 1045)  Care Plan - Patient's Chronic Conditions and Co-Morbidity Symptoms are Monitored and Maintained or Improved: Monitor and assess patient's chronic conditions and comorbid symptoms for stability, deterioration, or improvement  5/2/2025 0400 by Bella Foster RN  Outcome: Progressing     Problem: Discharge Planning  Goal: Discharge to home or other facility with appropriate resources  5/2/2025 1237 by Margarita Brown RN  Outcome: Progressing  Flowsheets (Taken 5/2/2025 1045)  Discharge to home or other facility with appropriate resources: Identify barriers to discharge with patient and caregiver  5/2/2025 0400 by Bella Foster RN  Outcome: Progressing     Problem: Skin/Tissue Integrity  Goal: Skin integrity remains intact  Description: 1.  Monitor for areas of redness and/or skin breakdown2.  Assess vascular access sites hourly3.  Every 4-6 hours minimum:  Change oxygen saturation probe site4.  Every 4-6 hours:  If on nasal continuous positive airway pressure, respiratory therapy assess nares and determine need for appliance change or resting period  5/2/2025 1237 by Margarita Brown RN  Outcome: Progressing  Flowsheets  Taken 5/2/2025 1236  Skin Integrity Remains Intact: Monitor for areas of redness and/or skin breakdown  Taken 5/2/2025 1045  Skin Integrity Remains Intact: Monitor for areas of redness and/or skin breakdown  5/2/2025 0400 by Bella Foster RN  Outcome: Progressing     Problem: Safety - Adult  Goal: Free from fall injury  5/2/2025 1237 by Margarita Brown RN  Outcome: Progressing  Flowsheets (Taken 5/2/2025 1236)  Free From Fall Injury: Instruct family/caregiver on patient safety  5/2/2025 0400 by Bella Foster RN  Outcome: Progressing

## 2025-05-02 NOTE — PLAN OF CARE
Problem: Chronic Conditions and Co-morbidities  Goal: Patient's chronic conditions and co-morbidity symptoms are monitored and maintained or improved  Outcome: Progressing     Problem: Discharge Planning  Goal: Discharge to home or other facility with appropriate resources  Outcome: Progressing     Problem: Skin/Tissue Integrity  Goal: Skin integrity remains intact  Description: 1.  Monitor for areas of redness and/or skin breakdown2.  Assess vascular access sites hourly3.  Every 4-6 hours minimum:  Change oxygen saturation probe site4.  Every 4-6 hours:  If on nasal continuous positive airway pressure, respiratory therapy assess nares and determine need for appliance change or resting period  Outcome: Progressing     Problem: Safety - Adult  Goal: Free from fall injury  Outcome: Progressing

## 2025-05-03 ENCOUNTER — APPOINTMENT (OUTPATIENT)
Facility: HOSPITAL | Age: 70
DRG: 812 | End: 2025-05-03
Payer: MEDICARE

## 2025-05-03 LAB
ALBUMIN SERPL-MCNC: 1.5 G/DL (ref 3.5–5)
ALBUMIN SERPL-MCNC: 1.6 G/DL (ref 3.5–5)
ALBUMIN/GLOB SERPL: 0.3 (ref 1.1–2.2)
ALP SERPL-CCNC: 123 U/L (ref 45–117)
ALT SERPL-CCNC: 9 U/L (ref 12–78)
ANION GAP SERPL CALC-SCNC: 5 MMOL/L (ref 2–12)
AST SERPL W P-5'-P-CCNC: 16 U/L (ref 15–37)
BASOPHILS # BLD: 0.04 K/UL (ref 0–0.1)
BASOPHILS NFR BLD: 0.4 % (ref 0–1)
BILIRUB DIRECT SERPL-MCNC: 0.2 MG/DL (ref 0–0.2)
BILIRUB SERPL-MCNC: 0.4 MG/DL (ref 0.2–1)
BUN SERPL-MCNC: 5 MG/DL (ref 6–20)
BUN/CREAT SERPL: 9 (ref 12–20)
CA-I BLD-MCNC: 7.1 MG/DL (ref 8.5–10.1)
CHLORIDE SERPL-SCNC: 100 MMOL/L (ref 97–108)
CO2 SERPL-SCNC: 29 MMOL/L (ref 21–32)
CREAT SERPL-MCNC: 0.55 MG/DL (ref 0.7–1.3)
DIFFERENTIAL METHOD BLD: ABNORMAL
EOSINOPHIL # BLD: 0.23 K/UL (ref 0–0.4)
EOSINOPHIL NFR BLD: 2.3 % (ref 0–7)
ERYTHROCYTE [DISTWIDTH] IN BLOOD BY AUTOMATED COUNT: 17 % (ref 11.5–14.5)
GLOBULIN SER CALC-MCNC: 5.4 G/DL (ref 2–4)
GLUCOSE BLD STRIP.AUTO-MCNC: 108 MG/DL (ref 65–100)
GLUCOSE BLD STRIP.AUTO-MCNC: 139 MG/DL (ref 65–100)
GLUCOSE BLD STRIP.AUTO-MCNC: 200 MG/DL (ref 65–100)
GLUCOSE BLD STRIP.AUTO-MCNC: 65 MG/DL (ref 65–100)
GLUCOSE BLD STRIP.AUTO-MCNC: 71 MG/DL (ref 65–100)
GLUCOSE SERPL-MCNC: 34 MG/DL (ref 65–100)
HCT VFR BLD AUTO: 24.3 % (ref 36.6–50.3)
HGB BLD-MCNC: 7.4 G/DL (ref 12.1–17)
IMM GRANULOCYTES # BLD AUTO: 0.05 K/UL (ref 0–0.04)
IMM GRANULOCYTES NFR BLD AUTO: 0.5 % (ref 0–0.5)
LYMPHOCYTES # BLD: 1.49 K/UL (ref 0.8–3.5)
LYMPHOCYTES NFR BLD: 15 % (ref 12–49)
MAGNESIUM SERPL-MCNC: 0.8 MG/DL (ref 1.6–2.4)
MCH RBC QN AUTO: 27.9 PG (ref 26–34)
MCHC RBC AUTO-ENTMCNC: 30.5 G/DL (ref 30–36.5)
MCV RBC AUTO: 91.7 FL (ref 80–99)
MONOCYTES # BLD: 0.95 K/UL (ref 0–1)
MONOCYTES NFR BLD: 9.6 % (ref 5–13)
NEUTS SEG # BLD: 7.16 K/UL (ref 1.8–8)
NEUTS SEG NFR BLD: 72.2 % (ref 32–75)
NRBC # BLD: 0 K/UL (ref 0–0.01)
NRBC BLD-RTO: 0 PER 100 WBC
PERFORMED BY:: ABNORMAL
PERFORMED BY:: NORMAL
PERFORMED BY:: NORMAL
PHOSPHATE SERPL-MCNC: 3.1 MG/DL (ref 2.6–4.7)
PLATELET # BLD AUTO: 386 K/UL (ref 150–400)
PMV BLD AUTO: 9.6 FL (ref 8.9–12.9)
POTASSIUM SERPL-SCNC: 3.4 MMOL/L (ref 3.5–5.1)
PROT SERPL-MCNC: 7 G/DL (ref 6.4–8.2)
RBC # BLD AUTO: 2.65 M/UL (ref 4.1–5.7)
SODIUM SERPL-SCNC: 134 MMOL/L (ref 136–145)
T4 FREE SERPL-MCNC: 1.3 NG/DL (ref 0.8–1.5)
TSH SERPL DL<=0.05 MIU/L-ACNC: 2.01 UIU/ML (ref 0.36–3.74)
WBC # BLD AUTO: 9.9 K/UL (ref 4.1–11.1)

## 2025-05-03 PROCEDURE — 36415 COLL VENOUS BLD VENIPUNCTURE: CPT

## 2025-05-03 PROCEDURE — 94761 N-INVAS EAR/PLS OXIMETRY MLT: CPT

## 2025-05-03 PROCEDURE — 6360000002 HC RX W HCPCS: Performed by: STUDENT IN AN ORGANIZED HEALTH CARE EDUCATION/TRAINING PROGRAM

## 2025-05-03 PROCEDURE — 6370000000 HC RX 637 (ALT 250 FOR IP): Performed by: HOSPITALIST

## 2025-05-03 PROCEDURE — 6370000000 HC RX 637 (ALT 250 FOR IP): Performed by: STUDENT IN AN ORGANIZED HEALTH CARE EDUCATION/TRAINING PROGRAM

## 2025-05-03 PROCEDURE — 1100000000 HC RM PRIVATE

## 2025-05-03 PROCEDURE — 6370000000 HC RX 637 (ALT 250 FOR IP): Performed by: PHYSICIAN ASSISTANT

## 2025-05-03 PROCEDURE — 94640 AIRWAY INHALATION TREATMENT: CPT

## 2025-05-03 PROCEDURE — 80076 HEPATIC FUNCTION PANEL: CPT

## 2025-05-03 PROCEDURE — 2500000003 HC RX 250 WO HCPCS: Performed by: HOSPITALIST

## 2025-05-03 PROCEDURE — 82962 GLUCOSE BLOOD TEST: CPT

## 2025-05-03 PROCEDURE — 85025 COMPLETE CBC W/AUTO DIFF WBC: CPT

## 2025-05-03 PROCEDURE — 80069 RENAL FUNCTION PANEL: CPT

## 2025-05-03 PROCEDURE — 6370000000 HC RX 637 (ALT 250 FOR IP): Performed by: INTERNAL MEDICINE

## 2025-05-03 PROCEDURE — 83735 ASSAY OF MAGNESIUM: CPT

## 2025-05-03 RX ORDER — METOPROLOL SUCCINATE 50 MG/1
50 TABLET, EXTENDED RELEASE ORAL 2 TIMES DAILY
Status: DISCONTINUED | OUTPATIENT
Start: 2025-05-03 | End: 2025-05-03

## 2025-05-03 RX ORDER — METOPROLOL SUCCINATE 25 MG/1
25 TABLET, EXTENDED RELEASE ORAL 2 TIMES DAILY
Status: DISCONTINUED | OUTPATIENT
Start: 2025-05-03 | End: 2025-05-05

## 2025-05-03 RX ORDER — METOPROLOL TARTRATE 25 MG/1
25 TABLET, FILM COATED ORAL 2 TIMES DAILY
Status: DISCONTINUED | OUTPATIENT
Start: 2025-05-03 | End: 2025-05-03

## 2025-05-03 RX ORDER — MAGNESIUM SULFATE IN WATER 40 MG/ML
2000 INJECTION, SOLUTION INTRAVENOUS ONCE
Status: COMPLETED | OUTPATIENT
Start: 2025-05-03 | End: 2025-05-03

## 2025-05-03 RX ORDER — FUROSEMIDE 40 MG/1
40 TABLET ORAL DAILY
Status: DISCONTINUED | OUTPATIENT
Start: 2025-05-04 | End: 2025-05-06

## 2025-05-03 RX ORDER — METOPROLOL TARTRATE 25 MG/1
25 TABLET, FILM COATED ORAL ONCE
Status: COMPLETED | OUTPATIENT
Start: 2025-05-03 | End: 2025-05-03

## 2025-05-03 RX ORDER — POTASSIUM CHLORIDE 1500 MG/1
40 TABLET, EXTENDED RELEASE ORAL ONCE
Status: COMPLETED | OUTPATIENT
Start: 2025-05-03 | End: 2025-05-03

## 2025-05-03 RX ADMIN — Medication 2 PUFF: at 21:59

## 2025-05-03 RX ADMIN — FERROUS SULFATE TAB 325 MG (65 MG ELEMENTAL FE) 325 MG: 325 (65 FE) TAB at 09:20

## 2025-05-03 RX ADMIN — INSULIN LISPRO 2 UNITS: 100 INJECTION, SOLUTION INTRAVENOUS; SUBCUTANEOUS at 16:31

## 2025-05-03 RX ADMIN — SODIUM CHLORIDE, PRESERVATIVE FREE 10 ML: 5 INJECTION INTRAVENOUS at 09:21

## 2025-05-03 RX ADMIN — SODIUM CHLORIDE, PRESERVATIVE FREE 10 ML: 5 INJECTION INTRAVENOUS at 21:58

## 2025-05-03 RX ADMIN — DIPHENHYDRAMINE HYDROCHLORIDE 25 MG: 25 CAPSULE ORAL at 00:17

## 2025-05-03 RX ADMIN — PREGABALIN 75 MG: 75 CAPSULE ORAL at 21:57

## 2025-05-03 RX ADMIN — PANTOPRAZOLE SODIUM 40 MG: 40 TABLET, DELAYED RELEASE ORAL at 07:52

## 2025-05-03 RX ADMIN — OXYCODONE HYDROCHLORIDE 10 MG: 10 TABLET ORAL at 00:16

## 2025-05-03 RX ADMIN — Medication 2 PUFF: at 08:29

## 2025-05-03 RX ADMIN — LACTULOSE 20 G: 20 SOLUTION ORAL at 21:57

## 2025-05-03 RX ADMIN — FUROSEMIDE 20 MG: 40 TABLET ORAL at 09:20

## 2025-05-03 RX ADMIN — PANCRELIPASE LIPASE, PANCRELIPASE PROTEASE, PANCRELIPASE AMYLASE 5000 UNITS: 5000; 17000; 24000 CAPSULE, DELAYED RELEASE ORAL at 16:32

## 2025-05-03 RX ADMIN — OXYCODONE HYDROCHLORIDE 10 MG: 10 TABLET ORAL at 16:32

## 2025-05-03 RX ADMIN — SPIRONOLACTONE 25 MG: 25 TABLET ORAL at 09:20

## 2025-05-03 RX ADMIN — DIPHENHYDRAMINE HYDROCHLORIDE 25 MG: 25 CAPSULE ORAL at 16:32

## 2025-05-03 RX ADMIN — DIPHENHYDRAMINE HYDROCHLORIDE 25 MG: 25 CAPSULE ORAL at 09:20

## 2025-05-03 RX ADMIN — METOPROLOL SUCCINATE 25 MG: 25 TABLET, FILM COATED, EXTENDED RELEASE ORAL at 09:20

## 2025-05-03 RX ADMIN — DIPHENHYDRAMINE HYDROCHLORIDE 25 MG: 25 CAPSULE ORAL at 23:25

## 2025-05-03 RX ADMIN — METOPROLOL TARTRATE 25 MG: 25 TABLET, FILM COATED ORAL at 16:32

## 2025-05-03 RX ADMIN — Medication 3 MG: at 21:57

## 2025-05-03 RX ADMIN — MAGNESIUM SULFATE HEPTAHYDRATE 2000 MG: 40 INJECTION, SOLUTION INTRAVENOUS at 07:51

## 2025-05-03 RX ADMIN — OXYCODONE HYDROCHLORIDE 10 MG: 10 TABLET ORAL at 23:22

## 2025-05-03 RX ADMIN — PREGABALIN 75 MG: 75 CAPSULE ORAL at 09:26

## 2025-05-03 RX ADMIN — POTASSIUM CHLORIDE 40 MEQ: 1500 TABLET, EXTENDED RELEASE ORAL at 09:20

## 2025-05-03 RX ADMIN — PANCRELIPASE LIPASE, PANCRELIPASE PROTEASE, PANCRELIPASE AMYLASE 5000 UNITS: 5000; 17000; 24000 CAPSULE, DELAYED RELEASE ORAL at 14:04

## 2025-05-03 RX ADMIN — OXYCODONE HYDROCHLORIDE 10 MG: 10 TABLET ORAL at 09:20

## 2025-05-03 RX ADMIN — PANCRELIPASE LIPASE, PANCRELIPASE PROTEASE, PANCRELIPASE AMYLASE 5000 UNITS: 5000; 17000; 24000 CAPSULE, DELAYED RELEASE ORAL at 09:20

## 2025-05-03 RX ADMIN — TAMSULOSIN HYDROCHLORIDE 0.4 MG: 0.4 CAPSULE ORAL at 09:20

## 2025-05-03 RX ADMIN — METOPROLOL SUCCINATE 25 MG: 25 TABLET, EXTENDED RELEASE ORAL at 21:57

## 2025-05-03 ASSESSMENT — PAIN SCALES - GENERAL
PAINLEVEL_OUTOF10: 9
PAINLEVEL_OUTOF10: 5
PAINLEVEL_OUTOF10: 9
PAINLEVEL_OUTOF10: 9
PAINLEVEL_OUTOF10: 7
PAINLEVEL_OUTOF10: 2

## 2025-05-03 ASSESSMENT — PAIN SCALES - WONG BAKER
WONGBAKER_NUMERICALRESPONSE: HURTS A LITTLE BIT
WONGBAKER_NUMERICALRESPONSE: HURTS WHOLE LOT
WONGBAKER_NUMERICALRESPONSE: HURTS WHOLE LOT

## 2025-05-03 ASSESSMENT — PAIN DESCRIPTION - LOCATION
LOCATION: ABDOMEN;BACK
LOCATION: BACK

## 2025-05-03 ASSESSMENT — PAIN DESCRIPTION - DESCRIPTORS
DESCRIPTORS: ACHING
DESCRIPTORS: ACHING
DESCRIPTORS: ACHING;GNAWING

## 2025-05-03 ASSESSMENT — PAIN DESCRIPTION - ORIENTATION
ORIENTATION: RIGHT;LOWER;POSTERIOR
ORIENTATION: LOWER
ORIENTATION: RIGHT
ORIENTATION: RIGHT;LOWER;POSTERIOR

## 2025-05-03 ASSESSMENT — PAIN - FUNCTIONAL ASSESSMENT: PAIN_FUNCTIONAL_ASSESSMENT: PREVENTS OR INTERFERES SOME ACTIVE ACTIVITIES AND ADLS

## 2025-05-03 NOTE — PROGRESS NOTES
Hospitalist Progress Note    NAME:   Santos Laughlin   : 1955   MRN: [id number]     Date/Time: 5/3/2025 8:37 AM  Patient PCP: Nitish Dela Cruz DO    Estimated discharge date:24-48hrs  Barriers: stable Hgb, BLE duplex, cardio eval    Hospital course:  Mr. Laughlin is a 70 year old male with hx of chronic pancreatitis, cirrhosis, recently discharged from here with 2 weeks of IV merrem on 2025 for sepsis suspected due to complex hepatic cyst who presented to the emergency department for generalized weakness.  Patient resides at [facility] and was found to have hemoglobin 6.3 subsequently transferred here for further workup and evaluation.    On initial presentation patient hypertensive, tachycardic.  Repeat CBC with hemoglobin 6.3.  1 unit PRBC ordered.  Stool occult negative.  INR 1.6.  CMP with potassium 3.3, renal function with normal limits.  BNP 4285.  Troponin negative. Of note EKG with Afib with RVR. We were asked to admit for further workup and evaluation of acute on chronic anemia. Hgb improved 7.4. Cardiology consulted given new onset Afib. Repeat TTE ordered with preserved EF 55 to 60%.    Patient hypoglycemic morning , asymptomatic.  Lantus held.    Assessment / Plan:  Acute on chronic anemia  Hgb initially 6.3-7.4 status post 1 unit PRBC -7.4  Hemoglobin ranging 7.8-6.8 during last admission 1 month ago  Stool occult negative  Iron studies with iron 21, TIBC 74  B12 and folate within normal limits  Suspect iron deficiency anemia versus anemia of chronic disease  Continue oral iron supplementation  Continue to monitor H&H  Transfuse for hemoglobin less than 6    Atrial fibrillation, ?new onset  EKG  A-fib, rate 1 5, no acute ischemic changes  No known history, previous EKGs 3/2025 normal sinus rhythm  Continue metoprolol  TSH/T4 in process  [id number] of 3  Hold initiating anticoagulation at this time given acute anemia  Last echo 3/2025 with a EF 55 to 60%  Cardiology  5/2/2025 1625  Gross per 24 hour   Intake 10 ml   Output 700 ml   Net -690 ml        I had a face to face encounter and independently examined this patient on 5/3/2025, as outlined below:    Review of Systems   Constitutional:  Positive for fatigue. Negative for chills and fever.   Eyes:  Negative for visual disturbance.   Respiratory:  Negative for cough and shortness of breath.    Cardiovascular:  Positive for leg swelling. Negative for chest pain and palpitations.   Gastrointestinal:  Positive for abdominal pain and constipation. Negative for diarrhea, nausea and vomiting.   Genitourinary:  Negative for difficulty urinating and dysuria.   Musculoskeletal:  Negative for back pain and neck pain.   Skin:  Negative for rash.   Neurological:  Negative for weakness, numbness and headaches.   Psychiatric/Behavioral:  Negative for confusion.         PHYSICAL EXAM:  Physical Exam  Vitals reviewed.   Constitutional:       General: He is not in acute distress.     Appearance: Normal appearance.   HENT:      Head: Normocephalic and atraumatic.   Eyes:      Extraocular Movements: Extraocular movements intact.      Conjunctiva/sclera: Conjunctivae normal.   Cardiovascular:      Rate and Rhythm: Normal rate. Rhythm irregular.      Heart sounds: Normal heart sounds.   Pulmonary:      Effort: Pulmonary effort is normal. No respiratory distress.      Breath sounds: Normal breath sounds. No wheezing or rales.   Abdominal:      General: Bowel sounds are normal. There is no distension.      Palpations: Abdomen is soft.      Tenderness: There is abdominal tenderness. There is no guarding or rebound.      Comments: Tenderness palpation right upper quadrant epigastric region   Musculoskeletal:         General: Normal range of motion.      Right lower leg: Edema present.      Left lower leg: Edema present.      Comments: 3+ pitting edema bilaterally   Skin:     General: Skin is warm and dry.   Neurological:      Mental Status: He is

## 2025-05-03 NOTE — PLAN OF CARE
Problem: Chronic Conditions and Co-morbidities  Goal: Patient's chronic conditions and co-morbidity symptoms are monitored and maintained or improved  5/3/2025 1129 by Margarita Brown RN  Outcome: Progressing  Flowsheets (Taken 5/3/2025 0920)  Care Plan - Patient's Chronic Conditions and Co-Morbidity Symptoms are Monitored and Maintained or Improved: Monitor and assess patient's chronic conditions and comorbid symptoms for stability, deterioration, or improvement  5/2/2025 2232 by Terry Domínguez RN  Outcome: Progressing     Problem: Discharge Planning  Goal: Discharge to home or other facility with appropriate resources  5/3/2025 1129 by Margarita Brown RN  Outcome: Progressing  Flowsheets (Taken 5/3/2025 0920)  Discharge to home or other facility with appropriate resources: Identify barriers to discharge with patient and caregiver  5/2/2025 2232 by Terry Domínguez RN  Outcome: Progressing     Problem: Skin/Tissue Integrity  Goal: Skin integrity remains intact  Description: 1.  Monitor for areas of redness and/or skin breakdown2.  Assess vascular access sites hourly3.  Every 4-6 hours minimum:  Change oxygen saturation probe site4.  Every 4-6 hours:  If on nasal continuous positive airway pressure, respiratory therapy assess nares and determine need for appliance change or resting period  5/3/2025 1129 by Margarita Brown RN  Outcome: Progressing  Flowsheets  Taken 5/3/2025 1128  Skin Integrity Remains Intact: Monitor for areas of redness and/or skin breakdown  Taken 5/3/2025 0920  Skin Integrity Remains Intact: Monitor for areas of redness and/or skin breakdown  5/2/2025 2232 by Terry Domínguez RN  Outcome: Progressing     Problem: Safety - Adult  Goal: Free from fall injury  5/3/2025 1129 by Margarita Brown RN  Outcome: Progressing  Flowsheets (Taken 5/3/2025 1128)  Free From Fall Injury: Instruct family/caregiver on patient safety  5/2/2025 2232 by Terry Domínguez RN  Outcome: Progressing     Problem:  infection at discharge  Outcome: Progressing  Flowsheets (Taken 5/3/2025 0920)  Absence of infection at discharge: Assess and monitor for signs and symptoms of infection  Goal: Absence of infection during hospitalization  Outcome: Progressing  Flowsheets (Taken 5/3/2025 0920)  Absence of infection during hospitalization: Assess and monitor for signs and symptoms of infection  Goal: Absence of fever/infection during anticipated neutropenic period  Outcome: Progressing  Flowsheets (Taken 5/3/2025 0920)  Absence of fever/infection during anticipated neutropenic period: Monitor white blood cell count     Problem: Metabolic/Fluid and Electrolytes - Adult  Goal: Electrolytes maintained within normal limits  Outcome: Progressing  Flowsheets (Taken 5/3/2025 0920)  Electrolytes maintained within normal limits: Monitor labs and assess patient for signs and symptoms of electrolyte imbalances  Goal: Hemodynamic stability and optimal renal function maintained  Outcome: Progressing  Flowsheets (Taken 5/3/2025 0920)  Hemodynamic stability and optimal renal function maintained: Monitor labs and assess for signs and symptoms of volume excess or deficit  Goal: Glucose maintained within prescribed range  Outcome: Progressing  Flowsheets (Taken 5/3/2025 0920)  Glucose maintained within prescribed range: Monitor blood glucose as ordered     Problem: Hematologic - Adult  Goal: Maintains hematologic stability  Outcome: Progressing  Flowsheets (Taken 5/3/2025 0920)  Maintains hematologic stability: Assess for signs and symptoms of bleeding or hemorrhage

## 2025-05-03 NOTE — PLAN OF CARE
Problem: Chronic Conditions and Co-morbidities  Goal: Patient's chronic conditions and co-morbidity symptoms are monitored and maintained or improved  5/2/2025 2232 by Terry Domínguez RN  Outcome: Progressing     Problem: Discharge Planning  Goal: Discharge to home or other facility with appropriate resources  5/2/2025 2232 by Terry Domínguez RN  Outcome: Progressing     Problem: Skin/Tissue Integrity  Goal: Skin integrity remains intact  Description: 1.  Monitor for areas of redness and/or skin breakdown2.  Assess vascular access sites hourly3.  Every 4-6 hours minimum:  Change oxygen saturation probe site4.  Every 4-6 hours:  If on nasal continuous positive airway pressure, respiratory therapy assess nares and determine need for appliance change or resting period  5/2/2025 2232 by Terry Domínguez RN  Outcome: Progressing     Problem: Safety - Adult  Goal: Free from fall injury  5/2/2025 2232 by Terry Domínguez RN  Outcome: Progressing     Problem: Pain  Goal: Verbalizes/displays adequate comfort level or baseline comfort level  5/2/2025 2232 by Terry Domínguez RN  Outcome: Progressing

## 2025-05-03 NOTE — CONSULTS
Cardiology Consult    NAME: Santos Laughlin   :  1955   MRN:  [id number]     Date/Time:  5/3/2025 12:51 PM    Patient PCP: Nitish Dela Cruz DO  ________________________________________________________________________    Problem List  - Admitted to the hospital with abdominal pain  - Cardiology consult was invited secondary to atrial fibrillation.  - Diabetes mellitus  - Hypertension  - Bilateral leg swelling and 3+ pedal edema  - COPD  - Chronic pancreatitis  - Chronic back  - Former smoker  - History of alcohol abuse    Normal ejection fraction of 62% as of 3/3/2025     Assessment and Plan:   Note dictated May 3, 2025  - 70-year-old -American male with no known established coronary artery disease admitted to the hospital with being feeling fatigue and tired and found to have low hemoglobin and required transfusion.  -When I saw the patient he was lying comfortably in the bed and is still feeling fatigue and tired and stated that he do not have any heart issues..  - Cardiology consult was invited secondary to atrial fibrillation with heart rate in 90s and 100 with no other acute changes on the EKG or on telemetry.-Echocardiogram done dated May 2, 2025 shows ejection fraction of 55 to 60% with no other significant abnormalities.  - As patient is completely asymptomatic with heart rate in 90s and 100 I will take the liberty to increase AV node blocking agents to control the heart rate and to minimize risk of tachycardia induced cardiomyopathy  - Patient has multiple comorbidities including diabetes mellitus hypertension anemia so at this time no further cardiovascular testing is warranted as the results of which would not change my management.  - Will maximize AV node blocking agents as tolerated clinically and hemodynamically and following while he is in the hospital along with the team.  - We will give an extra dose of metoprolol XL 25 mg now and start metoprolol tartrate 50 mg twice a day

## 2025-05-04 ENCOUNTER — APPOINTMENT (OUTPATIENT)
Facility: HOSPITAL | Age: 70
DRG: 812 | End: 2025-05-04
Payer: MEDICARE

## 2025-05-04 LAB
ALBUMIN SERPL-MCNC: 1.5 G/DL (ref 3.5–5)
ALBUMIN/GLOB SERPL: 0.3 (ref 1.1–2.2)
ALP SERPL-CCNC: 133 U/L (ref 45–117)
ALT SERPL-CCNC: 8 U/L (ref 12–78)
ANION GAP SERPL CALC-SCNC: 4 MMOL/L (ref 2–12)
AST SERPL W P-5'-P-CCNC: 13 U/L (ref 15–37)
BASOPHILS # BLD: 0.04 K/UL (ref 0–0.1)
BASOPHILS NFR BLD: 0.4 % (ref 0–1)
BILIRUB SERPL-MCNC: 0.2 MG/DL (ref 0.2–1)
BUN SERPL-MCNC: 7 MG/DL (ref 6–20)
BUN/CREAT SERPL: 10 (ref 12–20)
CA-I BLD-MCNC: 7.3 MG/DL (ref 8.5–10.1)
CHLORIDE SERPL-SCNC: 99 MMOL/L (ref 97–108)
CO2 SERPL-SCNC: 29 MMOL/L (ref 21–32)
CREAT SERPL-MCNC: 0.71 MG/DL (ref 0.7–1.3)
DIFFERENTIAL METHOD BLD: ABNORMAL
EOSINOPHIL # BLD: 0.29 K/UL (ref 0–0.4)
EOSINOPHIL NFR BLD: 2.7 % (ref 0–7)
ERYTHROCYTE [DISTWIDTH] IN BLOOD BY AUTOMATED COUNT: 17.3 % (ref 11.5–14.5)
GLOBULIN SER CALC-MCNC: 5.4 G/DL (ref 2–4)
GLUCOSE BLD STRIP.AUTO-MCNC: 142 MG/DL (ref 65–100)
GLUCOSE BLD STRIP.AUTO-MCNC: 165 MG/DL (ref 65–100)
GLUCOSE BLD STRIP.AUTO-MCNC: 226 MG/DL (ref 65–100)
GLUCOSE BLD STRIP.AUTO-MCNC: 93 MG/DL (ref 65–100)
GLUCOSE SERPL-MCNC: 111 MG/DL (ref 65–100)
HCT VFR BLD AUTO: 24.4 % (ref 36.6–50.3)
HGB BLD-MCNC: 7.3 G/DL (ref 12.1–17)
IMM GRANULOCYTES # BLD AUTO: 0.03 K/UL (ref 0–0.04)
IMM GRANULOCYTES NFR BLD AUTO: 0.3 % (ref 0–0.5)
LYMPHOCYTES # BLD: 1.63 K/UL (ref 0.8–3.5)
LYMPHOCYTES NFR BLD: 15.3 % (ref 12–49)
MAGNESIUM SERPL-MCNC: 1.1 MG/DL (ref 1.6–2.4)
MCH RBC QN AUTO: 27.7 PG (ref 26–34)
MCHC RBC AUTO-ENTMCNC: 29.9 G/DL (ref 30–36.5)
MCV RBC AUTO: 92.4 FL (ref 80–99)
MONOCYTES # BLD: 1 K/UL (ref 0–1)
MONOCYTES NFR BLD: 9.4 % (ref 5–13)
NEUTS SEG # BLD: 7.68 K/UL (ref 1.8–8)
NEUTS SEG NFR BLD: 71.9 % (ref 32–75)
NRBC # BLD: 0 K/UL (ref 0–0.01)
NRBC BLD-RTO: 0 PER 100 WBC
PERFORMED BY:: ABNORMAL
PERFORMED BY:: NORMAL
PLATELET # BLD AUTO: 323 K/UL (ref 150–400)
PMV BLD AUTO: 9.9 FL (ref 8.9–12.9)
POTASSIUM SERPL-SCNC: 4.3 MMOL/L (ref 3.5–5.1)
PROT SERPL-MCNC: 6.9 G/DL (ref 6.4–8.2)
RBC # BLD AUTO: 2.64 M/UL (ref 4.1–5.7)
SODIUM SERPL-SCNC: 132 MMOL/L (ref 136–145)
WBC # BLD AUTO: 10.7 K/UL (ref 4.1–11.1)

## 2025-05-04 PROCEDURE — 6370000000 HC RX 637 (ALT 250 FOR IP): Performed by: HOSPITALIST

## 2025-05-04 PROCEDURE — 83735 ASSAY OF MAGNESIUM: CPT

## 2025-05-04 PROCEDURE — 94640 AIRWAY INHALATION TREATMENT: CPT

## 2025-05-04 PROCEDURE — 2500000003 HC RX 250 WO HCPCS: Performed by: HOSPITALIST

## 2025-05-04 PROCEDURE — 36415 COLL VENOUS BLD VENIPUNCTURE: CPT

## 2025-05-04 PROCEDURE — 93970 EXTREMITY STUDY: CPT

## 2025-05-04 PROCEDURE — 80053 COMPREHEN METABOLIC PANEL: CPT

## 2025-05-04 PROCEDURE — 85025 COMPLETE CBC W/AUTO DIFF WBC: CPT

## 2025-05-04 PROCEDURE — 82962 GLUCOSE BLOOD TEST: CPT

## 2025-05-04 PROCEDURE — 6370000000 HC RX 637 (ALT 250 FOR IP): Performed by: PHYSICIAN ASSISTANT

## 2025-05-04 PROCEDURE — 1100000000 HC RM PRIVATE

## 2025-05-04 PROCEDURE — 94761 N-INVAS EAR/PLS OXIMETRY MLT: CPT

## 2025-05-04 PROCEDURE — 6370000000 HC RX 637 (ALT 250 FOR IP): Performed by: INTERNAL MEDICINE

## 2025-05-04 PROCEDURE — 6360000002 HC RX W HCPCS: Performed by: STUDENT IN AN ORGANIZED HEALTH CARE EDUCATION/TRAINING PROGRAM

## 2025-05-04 PROCEDURE — 6370000000 HC RX 637 (ALT 250 FOR IP): Performed by: STUDENT IN AN ORGANIZED HEALTH CARE EDUCATION/TRAINING PROGRAM

## 2025-05-04 RX ORDER — MAGNESIUM SULFATE IN WATER 40 MG/ML
2000 INJECTION, SOLUTION INTRAVENOUS ONCE
Status: COMPLETED | OUTPATIENT
Start: 2025-05-04 | End: 2025-05-04

## 2025-05-04 RX ORDER — SPIRONOLACTONE 25 MG/1
50 TABLET ORAL DAILY
Status: DISCONTINUED | OUTPATIENT
Start: 2025-05-04 | End: 2025-05-10 | Stop reason: HOSPADM

## 2025-05-04 RX ADMIN — Medication 2 PUFF: at 21:18

## 2025-05-04 RX ADMIN — PREGABALIN 75 MG: 75 CAPSULE ORAL at 20:54

## 2025-05-04 RX ADMIN — PANCRELIPASE LIPASE, PANCRELIPASE PROTEASE, PANCRELIPASE AMYLASE 5000 UNITS: 5000; 17000; 24000 CAPSULE, DELAYED RELEASE ORAL at 08:36

## 2025-05-04 RX ADMIN — METOPROLOL SUCCINATE 25 MG: 25 TABLET, EXTENDED RELEASE ORAL at 08:23

## 2025-05-04 RX ADMIN — Medication 3 MG: at 20:54

## 2025-05-04 RX ADMIN — SODIUM CHLORIDE, PRESERVATIVE FREE 10 ML: 5 INJECTION INTRAVENOUS at 20:54

## 2025-05-04 RX ADMIN — PREGABALIN 75 MG: 75 CAPSULE ORAL at 08:23

## 2025-05-04 RX ADMIN — TAMSULOSIN HYDROCHLORIDE 0.4 MG: 0.4 CAPSULE ORAL at 08:23

## 2025-05-04 RX ADMIN — MAGNESIUM SULFATE HEPTAHYDRATE 2000 MG: 40 INJECTION, SOLUTION INTRAVENOUS at 08:27

## 2025-05-04 RX ADMIN — FERROUS SULFATE TAB 325 MG (65 MG ELEMENTAL FE) 325 MG: 325 (65 FE) TAB at 08:23

## 2025-05-04 RX ADMIN — INSULIN LISPRO 2 UNITS: 100 INJECTION, SOLUTION INTRAVENOUS; SUBCUTANEOUS at 21:03

## 2025-05-04 RX ADMIN — SODIUM CHLORIDE, PRESERVATIVE FREE 10 ML: 5 INJECTION INTRAVENOUS at 08:39

## 2025-05-04 RX ADMIN — OXYCODONE HYDROCHLORIDE 10 MG: 10 TABLET ORAL at 19:20

## 2025-05-04 RX ADMIN — PANTOPRAZOLE SODIUM 40 MG: 40 TABLET, DELAYED RELEASE ORAL at 08:23

## 2025-05-04 RX ADMIN — DIPHENHYDRAMINE HYDROCHLORIDE 25 MG: 25 CAPSULE ORAL at 19:20

## 2025-05-04 RX ADMIN — OXYCODONE HYDROCHLORIDE 10 MG: 10 TABLET ORAL at 08:30

## 2025-05-04 RX ADMIN — SPIRONOLACTONE 50 MG: 25 TABLET ORAL at 08:22

## 2025-05-04 RX ADMIN — PANCRELIPASE LIPASE, PANCRELIPASE PROTEASE, PANCRELIPASE AMYLASE 5000 UNITS: 5000; 17000; 24000 CAPSULE, DELAYED RELEASE ORAL at 15:51

## 2025-05-04 RX ADMIN — DIPHENHYDRAMINE HYDROCHLORIDE 25 MG: 25 CAPSULE ORAL at 05:27

## 2025-05-04 RX ADMIN — PANCRELIPASE LIPASE, PANCRELIPASE PROTEASE, PANCRELIPASE AMYLASE 5000 UNITS: 5000; 17000; 24000 CAPSULE, DELAYED RELEASE ORAL at 11:39

## 2025-05-04 RX ADMIN — FUROSEMIDE 40 MG: 40 TABLET ORAL at 08:23

## 2025-05-04 RX ADMIN — METOPROLOL SUCCINATE 25 MG: 25 TABLET, EXTENDED RELEASE ORAL at 20:54

## 2025-05-04 ASSESSMENT — PAIN DESCRIPTION - LOCATION: LOCATION: BACK

## 2025-05-04 ASSESSMENT — PAIN SCALES - GENERAL
PAINLEVEL_OUTOF10: 5
PAINLEVEL_OUTOF10: 8
PAINLEVEL_OUTOF10: 4

## 2025-05-04 ASSESSMENT — PAIN SCALES - WONG BAKER: WONGBAKER_NUMERICALRESPONSE: HURTS LITTLE MORE

## 2025-05-04 ASSESSMENT — PAIN DESCRIPTION - ORIENTATION: ORIENTATION: MID

## 2025-05-04 NOTE — PROGRESS NOTES
Hospitalist Progress Note    NAME:   Santos Laughlin   : 1955   MRN: [id number]     Date/Time: 2025 8:12 AM  Patient PCP: Nitish Dela Cruz DO    Estimated discharge date:24-48hrs  Barriers: stable Hgb, BLE duplex, cardio clearance    Hospital course:  Mr. Laughlin is a 70 year old male with hx of chronic pancreatitis, cirrhosis, recently discharged from here with 2 weeks of IV merrem on 2025 for sepsis suspected due to complex hepatic cyst who presented to the emergency department for generalized weakness.  Patient resides at [facility] and was found to have hemoglobin 6.3 subsequently transferred here for further workup and evaluation.    On initial presentation patient hypertensive, tachycardic.  Repeat CBC with hemoglobin 6.3.  1 unit PRBC ordered.  Stool occult negative.  INR 1.6.  CMP with potassium 3.3, renal function with normal limits.  BNP 4285.  Troponin negative. Of note EKG with Afib with RVR. We were asked to admit for further workup and evaluation of acute on chronic anemia. Hgb improved 7.4. Cardiology consulted given new onset Afib. Repeat TTE ordered with preserved EF 55 to 60%.    Patient hypoglycemic morning , asymptomatic.  Lantus held. Blood sugars stable. Patient tolerating diet.  Iron studies suggestive of 1 anemia of chronic disease with iron deficiency, oral iron supplementation initiated and hemoglobin has remained stable.      Assessment / Plan:  Acute on chronic anemia  Hgb initially 6.3-7.4 status post 1 unit PRBC -7.4-7.3  Hemoglobin ranging 7.8-6.8 during last admission 1 month ago  Stool occult negative  Iron studies with iron 21, TIBC 74  B12 and folate within normal limits  Suspect iron deficiency anemia versus anemia of chronic disease  Continue oral iron supplementation  Continue to monitor H&H  Transfuse for hemoglobin less than 6    Atrial fibrillation, ?new onset  EKG  A-fib, rate 1 5, no acute ischemic changes  No known history, previous  status:    [] Decision to escalate care:    [] Major surgery/procedure with associated risk factors:    ----------------------------------------------------------------------  C. Data (any 2)  [x] Discussed current management and discharge planning options with Case Management.  [x] Discussed management of the case with: patient, nurse  [] Telemetry personally reviewed and interpreted as documented above    [] Imaging personally reviewed and interpreted, includes:    [] Data Review (any 3)  [x] All available Consultant notes from yesterday/today were reviewed  [x] All current labs were reviewed and interpreted for clinical significance   [x] Appropriate follow-up labs were ordered  [] Collateral history obtained from:      Code Status: FULL  DVT Prophylaxis: SCD  GI Prophylaxis:Protonix    Subjective:     Chief Complaint / Reason for Visit  Patient evaluated at the bedside. Patient reports persistent leg swelling and discomfort, currently rated 8/10. Urinating well. No chest pain or palpitations.  Continues to tolerate diet without nausea or vomiting.      Objective:     VITALS:   Last 24hrs VS reviewed since prior progress note. Most recent are:  Patient Vitals for the past 24 hrs:   BP Temp Temp src Pulse Resp SpO2   05/04/25 0755 121/88 98.2 °F (36.8 °C) Oral 82 16 100 %   05/04/25 0332 (!) 147/100 99.1 °F (37.3 °C) Oral 96 18 100 %   05/04/25 0033 -- -- -- 95 -- --   05/03/25 2352 -- -- -- -- 16 --   05/03/25 2206 -- -- -- (!) 102 16 99 %   05/03/25 2159 -- -- -- (!) 104 16 98 %   05/03/25 2051 (!) 136/96 98.4 °F (36.9 °C) Oral 72 18 90 %   05/03/25 2016 (!) 134/98 100 °F (37.8 °C) -- 89 14 98 %   05/03/25 1636 -- 98.6 °F (37 °C) -- -- -- --   05/03/25 1632 -- -- -- -- 18 --   05/03/25 1523 (!) 141/95 98.6 °F (37 °C) Oral -- 16 --   05/03/25 1505 -- -- -- 95 -- --   05/03/25 0858 (!) 140/99 98.4 °F (36.9 °C) Oral 100 18 --   05/03/25 0829 -- -- -- 92 16 100 %         Intake/Output Summary (Last 24 hours) at

## 2025-05-04 NOTE — PLAN OF CARE
Problem: Skin/Tissue Integrity  Goal: Skin integrity remains intact  Description: 1.  Monitor for areas of redness and/or skin breakdown2.  Assess vascular access sites hourly3.  Every 4-6 hours minimum:  Change oxygen saturation probe site4.  Every 4-6 hours:  If on nasal continuous positive airway pressure, respiratory therapy assess nares and determine need for appliance change or resting period  5/4/2025 0849 by Claudette Mcdaniels RN  Outcome: Progressing  5/4/2025 0103 by Marcelle Stokes RN  Outcome: Progressing     Problem: Safety - Adult  Goal: Free from fall injury  5/4/2025 0849 by Claudette Mcdaniels RN  Outcome: Progressing  5/4/2025 0103 by Marcelle Stokes RN  Outcome: Progressing     Problem: Pain  Goal: Verbalizes/displays adequate comfort level or baseline comfort level  5/4/2025 0849 by Claudette Mcdaniels RN  Outcome: Progressing  5/4/2025 0103 by Marcelle Stokes RN  Outcome: Progressing     Problem: Neurosensory - Adult  Goal: Achieves stable or improved neurological status  5/4/2025 0849 by Claudette Mcdaniels RN  Outcome: Progressing  5/4/2025 0103 by Marcelle Stokes RN  Outcome: Progressing     Problem: Neurosensory - Adult  Goal: Achieves maximal functionality and self care  5/4/2025 0849 by Claudette Mcdaniels RN  Outcome: Progressing  5/4/2025 0103 by Marcelle Stokes RN  Outcome: Progressing     Problem: Respiratory - Adult  Goal: Achieves optimal ventilation and oxygenation  5/4/2025 0849 by Claudette Mcdaniels RN  Outcome: Progressing  5/4/2025 0103 by Marcelle Stokes RN  Outcome: Progressing     Problem: Skin/Tissue Integrity - Adult  Goal: Skin integrity remains intact  Description: 1.  Monitor for areas of redness and/or skin breakdown2.  Assess vascular access sites hourly3.  Every 4-6 hours minimum:  Change oxygen saturation probe site4.  Every 4-6 hours:  If on nasal continuous positive airway pressure, respiratory therapy assess nares and determine need for appliance change or resting

## 2025-05-05 LAB
ALBUMIN SERPL-MCNC: 1.4 G/DL (ref 3.5–5)
ALBUMIN/GLOB SERPL: 0.3 (ref 1.1–2.2)
ALP SERPL-CCNC: 124 U/L (ref 45–117)
ALT SERPL-CCNC: 6 U/L (ref 12–78)
ANION GAP SERPL CALC-SCNC: 7 MMOL/L (ref 2–12)
APPEARANCE UR: CLEAR
AST SERPL W P-5'-P-CCNC: 12 U/L (ref 15–37)
BACTERIA URNS QL MICRO: NEGATIVE /HPF
BASOPHILS # BLD: 0.02 K/UL (ref 0–0.1)
BASOPHILS NFR BLD: 0.2 % (ref 0–1)
BILIRUB SERPL-MCNC: 0.4 MG/DL (ref 0.2–1)
BILIRUB UR QL: NEGATIVE
BUN SERPL-MCNC: 7 MG/DL (ref 6–20)
BUN/CREAT SERPL: 10 (ref 12–20)
CA-I BLD-MCNC: 7.7 MG/DL (ref 8.5–10.1)
CHLORIDE SERPL-SCNC: 100 MMOL/L (ref 97–108)
CO2 SERPL-SCNC: 26 MMOL/L (ref 21–32)
COLOR UR: NORMAL
CREAT SERPL-MCNC: 0.72 MG/DL (ref 0.7–1.3)
DIFFERENTIAL METHOD BLD: ABNORMAL
ECHO BSA: 2.35 M2
EOSINOPHIL # BLD: 0.11 K/UL (ref 0–0.4)
EOSINOPHIL NFR BLD: 0.9 % (ref 0–7)
EPITH CASTS URNS QL MICRO: NORMAL /LPF
ERYTHROCYTE [DISTWIDTH] IN BLOOD BY AUTOMATED COUNT: 17.2 % (ref 11.5–14.5)
GLOBULIN SER CALC-MCNC: 5.3 G/DL (ref 2–4)
GLUCOSE BLD STRIP.AUTO-MCNC: 168 MG/DL (ref 65–100)
GLUCOSE BLD STRIP.AUTO-MCNC: 223 MG/DL (ref 65–100)
GLUCOSE BLD STRIP.AUTO-MCNC: 229 MG/DL (ref 65–100)
GLUCOSE BLD STRIP.AUTO-MCNC: 252 MG/DL (ref 65–100)
GLUCOSE SERPL-MCNC: 198 MG/DL (ref 65–100)
GLUCOSE UR STRIP.AUTO-MCNC: NEGATIVE MG/DL
HCT VFR BLD AUTO: 22 % (ref 36.6–50.3)
HGB BLD-MCNC: 6.9 G/DL (ref 12.1–17)
HGB UR QL STRIP: NEGATIVE
IMM GRANULOCYTES # BLD AUTO: 0.05 K/UL (ref 0–0.04)
IMM GRANULOCYTES NFR BLD AUTO: 0.4 % (ref 0–0.5)
KETONES UR QL STRIP.AUTO: NEGATIVE MG/DL
LEUKOCYTE ESTERASE UR QL STRIP.AUTO: NEGATIVE
LYMPHOCYTES # BLD: 1.33 K/UL (ref 0.8–3.5)
LYMPHOCYTES NFR BLD: 11.4 % (ref 12–49)
MAGNESIUM SERPL-MCNC: 1.1 MG/DL (ref 1.6–2.4)
MCH RBC QN AUTO: 28 PG (ref 26–34)
MCHC RBC AUTO-ENTMCNC: 31.4 G/DL (ref 30–36.5)
MCV RBC AUTO: 89.4 FL (ref 80–99)
MONOCYTES # BLD: 1.17 K/UL (ref 0–1)
MONOCYTES NFR BLD: 10 % (ref 5–13)
MUCOUS THREADS URNS QL MICRO: NEGATIVE /LPF
NEUTS SEG # BLD: 9.02 K/UL (ref 1.8–8)
NEUTS SEG NFR BLD: 77.1 % (ref 32–75)
NITRITE UR QL STRIP.AUTO: NEGATIVE
NRBC # BLD: 0 K/UL (ref 0–0.01)
NRBC BLD-RTO: 0 PER 100 WBC
PERFORMED BY:: ABNORMAL
PH UR STRIP: 6 (ref 5–8)
PLATELET # BLD AUTO: 268 K/UL (ref 150–400)
PMV BLD AUTO: 10 FL (ref 8.9–12.9)
POTASSIUM SERPL-SCNC: 3.6 MMOL/L (ref 3.5–5.1)
PROT SERPL-MCNC: 6.7 G/DL (ref 6.4–8.2)
PROT UR STRIP-MCNC: NEGATIVE MG/DL
RBC # BLD AUTO: 2.46 M/UL (ref 4.1–5.7)
RBC #/AREA URNS HPF: NORMAL /HPF (ref 0–5)
RBC MORPH BLD: ABNORMAL
SODIUM SERPL-SCNC: 133 MMOL/L (ref 136–145)
SP GR UR REFRACTOMETRY: 1.01 (ref 1–1.03)
URINE CULTURE IF INDICATED: NORMAL
UROBILINOGEN UR QL STRIP.AUTO: 0.1 EU/DL (ref 0.1–1)
WBC # BLD AUTO: 11.7 K/UL (ref 4.1–11.1)
WBC URNS QL MICRO: NORMAL /HPF (ref 0–4)

## 2025-05-05 PROCEDURE — 85025 COMPLETE CBC W/AUTO DIFF WBC: CPT

## 2025-05-05 PROCEDURE — 80053 COMPREHEN METABOLIC PANEL: CPT

## 2025-05-05 PROCEDURE — 2500000003 HC RX 250 WO HCPCS: Performed by: HOSPITALIST

## 2025-05-05 PROCEDURE — 1100000000 HC RM PRIVATE

## 2025-05-05 PROCEDURE — 83735 ASSAY OF MAGNESIUM: CPT

## 2025-05-05 PROCEDURE — 6370000000 HC RX 637 (ALT 250 FOR IP): Performed by: HOSPITALIST

## 2025-05-05 PROCEDURE — 86900 BLOOD TYPING SEROLOGIC ABO: CPT

## 2025-05-05 PROCEDURE — P9016 RBC LEUKOCYTES REDUCED: HCPCS

## 2025-05-05 PROCEDURE — 86923 COMPATIBILITY TEST ELECTRIC: CPT

## 2025-05-05 PROCEDURE — 82962 GLUCOSE BLOOD TEST: CPT

## 2025-05-05 PROCEDURE — 81001 URINALYSIS AUTO W/SCOPE: CPT

## 2025-05-05 PROCEDURE — 6370000000 HC RX 637 (ALT 250 FOR IP): Performed by: PHYSICIAN ASSISTANT

## 2025-05-05 PROCEDURE — 36415 COLL VENOUS BLD VENIPUNCTURE: CPT

## 2025-05-05 PROCEDURE — 94640 AIRWAY INHALATION TREATMENT: CPT

## 2025-05-05 PROCEDURE — 86901 BLOOD TYPING SEROLOGIC RH(D): CPT

## 2025-05-05 PROCEDURE — 93970 EXTREMITY STUDY: CPT | Performed by: SURGERY

## 2025-05-05 PROCEDURE — 36430 TRANSFUSION BLD/BLD COMPNT: CPT

## 2025-05-05 PROCEDURE — 6370000000 HC RX 637 (ALT 250 FOR IP): Performed by: INTERNAL MEDICINE

## 2025-05-05 PROCEDURE — 86850 RBC ANTIBODY SCREEN: CPT

## 2025-05-05 PROCEDURE — 6370000000 HC RX 637 (ALT 250 FOR IP): Performed by: STUDENT IN AN ORGANIZED HEALTH CARE EDUCATION/TRAINING PROGRAM

## 2025-05-05 PROCEDURE — 94761 N-INVAS EAR/PLS OXIMETRY MLT: CPT

## 2025-05-05 RX ORDER — FERROUS SULFATE 325(65) MG
325 TABLET ORAL 2 TIMES DAILY WITH MEALS
Status: DISCONTINUED | OUTPATIENT
Start: 2025-05-05 | End: 2025-05-10 | Stop reason: HOSPADM

## 2025-05-05 RX ORDER — LANOLIN ALCOHOL/MO/W.PET/CERES
400 CREAM (GRAM) TOPICAL 2 TIMES DAILY
Status: DISCONTINUED | OUTPATIENT
Start: 2025-05-05 | End: 2025-05-10 | Stop reason: HOSPADM

## 2025-05-05 RX ORDER — METOPROLOL SUCCINATE 50 MG/1
50 TABLET, EXTENDED RELEASE ORAL 2 TIMES DAILY
Status: DISCONTINUED | OUTPATIENT
Start: 2025-05-05 | End: 2025-05-10 | Stop reason: HOSPADM

## 2025-05-05 RX ORDER — SODIUM CHLORIDE 9 MG/ML
INJECTION, SOLUTION INTRAVENOUS PRN
Status: DISCONTINUED | OUTPATIENT
Start: 2025-05-05 | End: 2025-05-10 | Stop reason: HOSPADM

## 2025-05-05 RX ORDER — MAGNESIUM HYDROXIDE/ALUMINUM HYDROXICE/SIMETHICONE 120; 1200; 1200 MG/30ML; MG/30ML; MG/30ML
30 SUSPENSION ORAL EVERY 6 HOURS PRN
Status: DISCONTINUED | OUTPATIENT
Start: 2025-05-05 | End: 2025-05-10 | Stop reason: HOSPADM

## 2025-05-05 RX ADMIN — INSULIN LISPRO 2 UNITS: 100 INJECTION, SOLUTION INTRAVENOUS; SUBCUTANEOUS at 20:10

## 2025-05-05 RX ADMIN — SODIUM CHLORIDE, PRESERVATIVE FREE 10 ML: 5 INJECTION INTRAVENOUS at 20:12

## 2025-05-05 RX ADMIN — OXYCODONE HYDROCHLORIDE 10 MG: 10 TABLET ORAL at 09:17

## 2025-05-05 RX ADMIN — TAMSULOSIN HYDROCHLORIDE 0.4 MG: 0.4 CAPSULE ORAL at 09:17

## 2025-05-05 RX ADMIN — PANCRELIPASE LIPASE, PANCRELIPASE PROTEASE, PANCRELIPASE AMYLASE 5000 UNITS: 5000; 17000; 24000 CAPSULE, DELAYED RELEASE ORAL at 11:58

## 2025-05-05 RX ADMIN — METOPROLOL SUCCINATE 50 MG: 50 TABLET, EXTENDED RELEASE ORAL at 09:17

## 2025-05-05 RX ADMIN — PREGABALIN 75 MG: 75 CAPSULE ORAL at 09:17

## 2025-05-05 RX ADMIN — SPIRONOLACTONE 50 MG: 25 TABLET ORAL at 09:17

## 2025-05-05 RX ADMIN — Medication 2 PUFF: at 07:56

## 2025-05-05 RX ADMIN — Medication 3 MG: at 20:10

## 2025-05-05 RX ADMIN — ALUMINUM HYDROXIDE, MAGNESIUM HYDROXIDE, AND SIMETHICONE 30 ML: 200; 200; 20 SUSPENSION ORAL at 17:26

## 2025-05-05 RX ADMIN — PREGABALIN 75 MG: 75 CAPSULE ORAL at 20:10

## 2025-05-05 RX ADMIN — PANCRELIPASE LIPASE, PANCRELIPASE PROTEASE, PANCRELIPASE AMYLASE 5000 UNITS: 5000; 17000; 24000 CAPSULE, DELAYED RELEASE ORAL at 09:17

## 2025-05-05 RX ADMIN — METOPROLOL SUCCINATE 50 MG: 50 TABLET, EXTENDED RELEASE ORAL at 20:10

## 2025-05-05 RX ADMIN — Medication 400 MG: at 20:10

## 2025-05-05 RX ADMIN — FERROUS SULFATE TAB 325 MG (65 MG ELEMENTAL FE) 325 MG: 325 (65 FE) TAB at 09:17

## 2025-05-05 RX ADMIN — OXYCODONE HYDROCHLORIDE 10 MG: 10 TABLET ORAL at 15:19

## 2025-05-05 RX ADMIN — PANTOPRAZOLE SODIUM 40 MG: 40 TABLET, DELAYED RELEASE ORAL at 07:03

## 2025-05-05 RX ADMIN — DIPHENHYDRAMINE HYDROCHLORIDE 25 MG: 25 CAPSULE ORAL at 23:24

## 2025-05-05 RX ADMIN — INSULIN LISPRO 4 UNITS: 100 INJECTION, SOLUTION INTRAVENOUS; SUBCUTANEOUS at 11:57

## 2025-05-05 RX ADMIN — PANCRELIPASE LIPASE, PANCRELIPASE PROTEASE, PANCRELIPASE AMYLASE 5000 UNITS: 5000; 17000; 24000 CAPSULE, DELAYED RELEASE ORAL at 15:49

## 2025-05-05 RX ADMIN — INSULIN LISPRO 2 UNITS: 100 INJECTION, SOLUTION INTRAVENOUS; SUBCUTANEOUS at 15:50

## 2025-05-05 RX ADMIN — SODIUM CHLORIDE, PRESERVATIVE FREE 10 ML: 5 INJECTION INTRAVENOUS at 08:57

## 2025-05-05 RX ADMIN — FERROUS SULFATE TAB 325 MG (65 MG ELEMENTAL FE) 325 MG: 325 (65 FE) TAB at 15:19

## 2025-05-05 RX ADMIN — Medication 2 PUFF: at 20:44

## 2025-05-05 RX ADMIN — ACETAMINOPHEN 650 MG: 325 TABLET ORAL at 15:48

## 2025-05-05 RX ADMIN — FUROSEMIDE 40 MG: 40 TABLET ORAL at 09:17

## 2025-05-05 RX ADMIN — ACETAMINOPHEN 650 MG: 325 TABLET ORAL at 23:25

## 2025-05-05 RX ADMIN — DIPHENHYDRAMINE HYDROCHLORIDE 25 MG: 25 CAPSULE ORAL at 09:17

## 2025-05-05 ASSESSMENT — PAIN SCALES - GENERAL
PAINLEVEL_OUTOF10: 8
PAINLEVEL_OUTOF10: 6
PAINLEVEL_OUTOF10: 0
PAINLEVEL_OUTOF10: 10
PAINLEVEL_OUTOF10: 0
PAINLEVEL_OUTOF10: 9

## 2025-05-05 ASSESSMENT — ENCOUNTER SYMPTOMS
ABDOMINAL PAIN: 1
NAUSEA: 0
DIARRHEA: 0
BACK PAIN: 0
SHORTNESS OF BREATH: 0
VOMITING: 0
COUGH: 0

## 2025-05-05 ASSESSMENT — PAIN DESCRIPTION - LOCATION
LOCATION: BACK
LOCATION: BACK
LOCATION: FLANK
LOCATION: ABDOMEN

## 2025-05-05 ASSESSMENT — PAIN DESCRIPTION - DESCRIPTORS
DESCRIPTORS: SHARP;SHOOTING
DESCRIPTORS: ACHING
DESCRIPTORS: ACHING

## 2025-05-05 ASSESSMENT — PAIN DESCRIPTION - ORIENTATION
ORIENTATION: LEFT;UPPER
ORIENTATION: LEFT
ORIENTATION: LOWER;MID

## 2025-05-05 ASSESSMENT — PAIN SCALES - WONG BAKER: WONGBAKER_NUMERICALRESPONSE: NO HURT

## 2025-05-05 NOTE — PLAN OF CARE
Problem: Chronic Conditions and Co-morbidities  Goal: Patient's chronic conditions and co-morbidity symptoms are monitored and maintained or improved  Outcome: Progressing     Problem: Discharge Planning  Goal: Discharge to home or other facility with appropriate resources  Outcome: Progressing     Problem: Skin/Tissue Integrity  Goal: Skin integrity remains intact  Description: 1.  Monitor for areas of redness and/or skin breakdown2.  Assess vascular access sites hourly3.  Every 4-6 hours minimum:  Change oxygen saturation probe site4.  Every 4-6 hours:  If on nasal continuous positive airway pressure, respiratory therapy assess nares and determine need for appliance change or resting period  5/5/2025 0839 by Claudette Mcdaniels RN  Outcome: Progressing  5/5/2025 0115 by Pratik Nunez RN  Outcome: Progressing  Flowsheets (Taken 5/3/2025 1128 by Margarita Brown RN)  Skin Integrity Remains Intact: Monitor for areas of redness and/or skin breakdown     Problem: Safety - Adult  Goal: Free from fall injury  5/5/2025 0839 by Claudette Mcdaniels RN  Outcome: Progressing  5/5/2025 0115 by Pratik Nunez RN  Outcome: Progressing  Flowsheets (Taken 5/3/2025 1128 by Margarita Brown RN)  Free From Fall Injury: Instruct family/caregiver on patient safety     Problem: Pain  Goal: Verbalizes/displays adequate comfort level or baseline comfort level  Outcome: Progressing     Problem: Neurosensory - Adult  Goal: Achieves stable or improved neurological status  Outcome: Progressing  Goal: Absence of seizures  Outcome: Progressing  Goal: Remains free of injury related to seizures activity  Outcome: Progressing  Goal: Achieves maximal functionality and self care  Outcome: Progressing     Problem: Respiratory - Adult  Goal: Achieves optimal ventilation and oxygenation  Outcome: Progressing     Problem: Cardiovascular - Adult  Goal: Maintains optimal cardiac output and hemodynamic

## 2025-05-05 NOTE — PROGRESS NOTES
Hospitalist Progress Note    NAME:   Santos Laughlin   : 1955   MRN: [id number]     Date/Time: 2025 7:13 AM  Patient PCP: Nitish Dela Cruz DO    Estimated discharge date:24-48hrs  Barriers: stable Hgb, transfusion, PT/OT    Hospital course:  Mr. Laughlin is a 70 year old male with hx of chronic pancreatitis, cirrhosis, recently discharged from here with 2 weeks of IV merrem on 2025 for sepsis suspected due to complex hepatic cyst who presented to the emergency department for generalized weakness.  Patient resides at [facility] and was found to have hemoglobin 6.3 subsequently transferred here for further workup and evaluation.    On initial presentation patient hypertensive, tachycardic.  Repeat CBC with hemoglobin 6.3.  1 unit PRBC ordered.  Stool occult negative.  INR 1.6.  CMP with potassium 3.3, renal function with normal limits.  BNP 4285.  Troponin negative. Of note EKG with Afib with RVR. We were asked to admit for further workup and evaluation of acute on chronic anemia. Hgb improved 7.4. Cardiology consulted given new onset Afib. Repeat TTE ordered with preserved EF 55 to 60%.  Metoprolol adjusted to 50 mg twice daily.      Patient hypoglycemic morning , asymptomatic.  Lantus held. Blood sugars stable. Patient tolerating diet.  Iron studies suggestive of 1 anemia of chronic disease with iron deficiency, oral iron supplementation initiated and hemoglobin has remained stable.    Patient with pitting edema bilaterally.  Bilateral lower extremity duplex negative.  Increase spironolactone once patient started on Lasix.    Hgb downtrended to 6.9. 1U PRBC ordered. Oral iron increased to BID. PT/OT ordered for evaluation as patient has been unable to ambulate on his own.    Assessment / Plan:  Acute on chronic anemia  Hgb initially 6.3-7.4 status post 1 unit PRBC 5/-7.4-7.3-6.9  Additional 1 unit PRBC ordered  Hemoglobin ranging 7.8-6.8 during last admission 1 month ago  Stool

## 2025-05-05 NOTE — WOUND CARE
Wound Care Note:    Wound Care into see patient because of wound to buttocks    Skin Care & Pressure Relief Recommendations:  Minimize layers of linen  Pads under patient to optimize support surface and microclimate  Turn/reposition approximately every 2 hours.  Pillow Wedges  Manage incontinence  Promote continence; Skin Protective lotion to buttocks and sacrum daily and as needed with incontinence care    Offload heels with pillows or offloading boots.    Support surface: Foam    MASD noted to inner buttocks and perianal.  Skin is slightly erythematous and denuded.  Provided incontinent care for medium soft brown/green BM.  Picture obtained with patient's verbal consent.  -For perianal and gluteal cleft: Apply Remedy Z-Guard Protectant Paste BID and prn for soiling.  If soiled, remove top soiled layer only and reapply.  Use Remedy Phytoplex Barrier Cream wipes for gentle cleansing.  To completely remove, use Remedy Foaming Cleanser or soap and water.      Prevention:  Apply Optifoam Border Sacral foam dressing to sacrum and Border Heel foams to both heels every three days to redistribute pressure from bony prominence and prevent pressure and friction injury to skin.  Rn is to gently peel back foam dressing for shift integumentary assessment and re-secure.  Apply an external  incontinence management system to manage  incontinence, if needed.  Use foam wedge to turn q2h at 30 degree angle to offload sacrum.  Float heels with 1-2 pillows lengthwise while in bed for offloading of the heels.  Maintain HOB at 30 degrees or less, if not contraindicated, to reduce pressure to buttocks and sacrum.  Raise foot of bed to help prevent friction and shear injury from sliding down in the bed. Re-consult [facility] for other skin/wound care concerns.

## 2025-05-05 NOTE — PROGRESS NOTES
Spiritual Health History and Assessment/Progress Note  [facility]    Initial Encounter,  ,  ,      Name: Santos Laughlin MRN: [id number]    Age: 70 y.o.     Sex: male   Language: English   Episcopal: None   Anemia, iron deficiency     Date: 5/5/2025            Total Time Calculated: 25 min              Spiritual Assessment began in [facility] [place]        Referral/Consult From: Rounding   Encounter Overview/Reason: Initial Encounter  Service Provided For: Patient    Shannan, Belief, Meaning:   Patient identifies as spiritual and has beliefs or practices that help with coping during difficult times  Family/Friends No family/friends present      Importance and Influence:  Patient has spiritual/personal beliefs that influence decisions regarding their health  Family/Friends No family/friends present    Community:  Patient feels well-supported. Support system includes: Children and Extended family  Family/Friends No family/friends present    Assessment and Plan of Care:     Patient Interventions include: Facilitated expression of thoughts and feelings, Explored spiritual coping/struggle/distress, Engaged in theological reflection, Affirmed coping skills/support systems, and Facilitated life review and/ or legacy  Family/Friends Interventions include: No family/friends present    Patient Plan of Care: Spiritual Care available upon further referral  Family/Friends Plan of Care: Spiritual Care available upon further referral     is visiting for an initial spiritual assessment with the patient in 224. Santos shared about his medical concerns and his present experience of pain. He is supported by his daughter, grand children, and extended family. He engaged in brief life review. He notes he is non-Adventist related to Adventist. He gives thanks to God for getting him through his experiences.  provided an active listening presence, spiritual assessment and prayer.     Electronically signed

## 2025-05-05 NOTE — CARE COORDINATION
Patient verbalized will be moving to [facility] at time of discharge  DME order recv'd for (manual wheelchair).  DME referral sent to DME supplier ([employer]).  Daughter leaving tonight heading to [facility].       TEDDY Hines

## 2025-05-05 NOTE — PROGRESS NOTES
Progress Note      5/5/2025 8:13 AM  NAME: Santos Laughlin   MRN:  [id number]   Admit Diagnosis: Anemia, iron deficiency [D50.9]  New onset atrial fibrillation (HCC) [I48.91]  Anemia, unspecified type [D64.9]      Problem List:   - Admitted to the hospital with abdominal pain  - Cardiology consult was invited secondary to atrial fibrillation.  - Diabetes mellitus  - Hypertension  - Bilateral leg swelling and 3+ pedal edema  - COPD  - Chronic pancreatitis  - Chronic back  - Former smoker  - History of alcohol abuse  -Multiple cysts in the pancreas and a pseudocyst imposing effect on the left hepatic lobe.  -Right middle lobe pneumonia     Normal ejection fraction of 62% as of 3/3/2025       Assessment/Plan:   Note dictated May 5, 2025  -Follow-up visit from cardiology over the weekend.  Patient is lying comfortably in the bed.  -Monitor shows atrial fibrillation with heart rate in 90s telemetry reviewed personally which shows heart rate between 68 to 128 bpm  -I will take the liberty to increase metoprolol XL to 50 mg p.o. twice daily based on hemodynamics.  -I will also recommend compression stockings for his leg swelling as it is of noncardiogenic origin.  -Will continue to follow and make further cardiovascular management decision as clinically warranted while he is in the hospital along with the team.  ==============================================================  - 70-year-old -American male with no known established coronary artery disease admitted to the hospital with being feeling fatigue and tired and found to have low hemoglobin and required transfusion.  -When I saw the patient he was lying comfortably in the bed and is still feeling fatigue and tired and stated that he do not have any heart issues..  - Cardiology consult was invited secondary to atrial fibrillation with heart rate in 90s and 100 with no other acute changes on the EKG or on telemetry.-Echocardiogram done dated May 2, 2025

## 2025-05-05 NOTE — PLAN OF CARE
Problem: Safety - Adult  Goal: Free from fall injury  Outcome: Progressing  Flowsheets (Taken 5/3/2025 1128 by Margarita Brown, RN)  Free From Fall Injury: Instruct family/caregiver on patient safety     Problem: Skin/Tissue Integrity - Adult  Goal: Skin integrity remains intact  Description: 1.  Monitor for areas of redness and/or skin breakdown2.  Assess vascular access sites hourly3.  Every 4-6 hours minimum:  Change oxygen saturation probe site4.  Every 4-6 hours:  If on nasal continuous positive airway pressure, respiratory therapy assess nares and determine need for appliance change or resting period  Outcome: Progressing  Flowsheets (Taken 5/3/2025 1128 by Margarita Brown, RN)  Skin Integrity Remains Intact: Monitor for areas of redness and/or skin breakdown

## 2025-05-06 ENCOUNTER — APPOINTMENT (OUTPATIENT)
Facility: HOSPITAL | Age: 70
DRG: 812 | End: 2025-05-06
Payer: MEDICARE

## 2025-05-06 LAB
ABO + RH BLD: NORMAL
ALBUMIN SERPL-MCNC: 1.4 G/DL (ref 3.5–5)
ALBUMIN/GLOB SERPL: 0.3 (ref 1.1–2.2)
ALP SERPL-CCNC: 120 U/L (ref 45–117)
ALT SERPL-CCNC: 6 U/L (ref 12–78)
ANION GAP SERPL CALC-SCNC: 3 MMOL/L (ref 2–12)
AST SERPL W P-5'-P-CCNC: 12 U/L (ref 15–37)
BASOPHILS # BLD: 0.04 K/UL (ref 0–0.1)
BASOPHILS NFR BLD: 0.3 % (ref 0–1)
BILIRUB SERPL-MCNC: 0.5 MG/DL (ref 0.2–1)
BLD PROD TYP BPU: NORMAL
BLOOD BANK BLOOD PRODUCT EXPIRATION DATE: NORMAL
BLOOD BANK DISPENSE STATUS: NORMAL
BLOOD BANK ISBT PRODUCT BLOOD TYPE: 6200
BLOOD BANK UNIT TYPE AND RH: NORMAL
BLOOD GROUP ANTIBODIES SERPL: NEGATIVE
BPU ID: NORMAL
BUN SERPL-MCNC: 9 MG/DL (ref 6–20)
BUN/CREAT SERPL: 13 (ref 12–20)
CA-I BLD-MCNC: 7.6 MG/DL (ref 8.5–10.1)
CHLORIDE SERPL-SCNC: 101 MMOL/L (ref 97–108)
CO2 SERPL-SCNC: 29 MMOL/L (ref 21–32)
CREAT SERPL-MCNC: 0.67 MG/DL (ref 0.7–1.3)
CROSSMATCH RESULT: NORMAL
DIFFERENTIAL METHOD BLD: ABNORMAL
EOSINOPHIL # BLD: 0.26 K/UL (ref 0–0.4)
EOSINOPHIL NFR BLD: 2.1 % (ref 0–7)
ERYTHROCYTE [DISTWIDTH] IN BLOOD BY AUTOMATED COUNT: 17.2 % (ref 11.5–14.5)
GLOBULIN SER CALC-MCNC: 5.3 G/DL (ref 2–4)
GLUCOSE BLD STRIP.AUTO-MCNC: 117 MG/DL (ref 65–100)
GLUCOSE BLD STRIP.AUTO-MCNC: 194 MG/DL (ref 65–100)
GLUCOSE BLD STRIP.AUTO-MCNC: 232 MG/DL (ref 65–100)
GLUCOSE BLD STRIP.AUTO-MCNC: 232 MG/DL (ref 65–100)
GLUCOSE BLD STRIP.AUTO-MCNC: 97 MG/DL (ref 65–100)
GLUCOSE SERPL-MCNC: 109 MG/DL (ref 65–100)
HCT VFR BLD AUTO: 25.5 % (ref 36.6–50.3)
HGB BLD-MCNC: 7.8 G/DL (ref 12.1–17)
IMM GRANULOCYTES # BLD AUTO: 0.06 K/UL (ref 0–0.04)
IMM GRANULOCYTES NFR BLD AUTO: 0.5 % (ref 0–0.5)
LIPASE SERPL-CCNC: 7 U/L (ref 13–75)
LYMPHOCYTES # BLD: 1.49 K/UL (ref 0.8–3.5)
LYMPHOCYTES NFR BLD: 12.3 % (ref 12–49)
MAGNESIUM SERPL-MCNC: 1.2 MG/DL (ref 1.6–2.4)
MCH RBC QN AUTO: 28.3 PG (ref 26–34)
MCHC RBC AUTO-ENTMCNC: 30.6 G/DL (ref 30–36.5)
MCV RBC AUTO: 92.4 FL (ref 80–99)
MONOCYTES # BLD: 1.4 K/UL (ref 0–1)
MONOCYTES NFR BLD: 11.5 % (ref 5–13)
NEUTS SEG # BLD: 8.91 K/UL (ref 1.8–8)
NEUTS SEG NFR BLD: 73.3 % (ref 32–75)
NRBC # BLD: 0 K/UL (ref 0–0.01)
NRBC BLD-RTO: 0 PER 100 WBC
PERFORMED BY:: ABNORMAL
PERFORMED BY:: NORMAL
PLATELET # BLD AUTO: 246 K/UL (ref 150–400)
PMV BLD AUTO: 10.1 FL (ref 8.9–12.9)
POTASSIUM SERPL-SCNC: 3.8 MMOL/L (ref 3.5–5.1)
PROCALCITONIN SERPL-MCNC: 0.06 NG/ML
PROT SERPL-MCNC: 6.7 G/DL (ref 6.4–8.2)
RBC # BLD AUTO: 2.76 M/UL (ref 4.1–5.7)
SODIUM SERPL-SCNC: 133 MMOL/L (ref 136–145)
SPECIMEN EXP DATE BLD: NORMAL
TRANSFUSION STATUS PATIENT QL: NORMAL
TROPONIN I SERPL HS-MCNC: 13 NG/L (ref 0–76)
UNIT DIVISION: 0
UNIT ISSUE DATE/TIME: NORMAL
WBC # BLD AUTO: 12.2 K/UL (ref 4.1–11.1)

## 2025-05-06 PROCEDURE — 6370000000 HC RX 637 (ALT 250 FOR IP): Performed by: INTERNAL MEDICINE

## 2025-05-06 PROCEDURE — 85025 COMPLETE CBC W/AUTO DIFF WBC: CPT

## 2025-05-06 PROCEDURE — 2500000003 HC RX 250 WO HCPCS: Performed by: HOSPITALIST

## 2025-05-06 PROCEDURE — 71045 X-RAY EXAM CHEST 1 VIEW: CPT

## 2025-05-06 PROCEDURE — 82962 GLUCOSE BLOOD TEST: CPT

## 2025-05-06 PROCEDURE — 84145 PROCALCITONIN (PCT): CPT

## 2025-05-06 PROCEDURE — 6360000002 HC RX W HCPCS: Performed by: STUDENT IN AN ORGANIZED HEALTH CARE EDUCATION/TRAINING PROGRAM

## 2025-05-06 PROCEDURE — 2500000003 HC RX 250 WO HCPCS: Performed by: STUDENT IN AN ORGANIZED HEALTH CARE EDUCATION/TRAINING PROGRAM

## 2025-05-06 PROCEDURE — 94010 BREATHING CAPACITY TEST: CPT

## 2025-05-06 PROCEDURE — 6370000000 HC RX 637 (ALT 250 FOR IP): Performed by: HOSPITALIST

## 2025-05-06 PROCEDURE — 6370000000 HC RX 637 (ALT 250 FOR IP): Performed by: STUDENT IN AN ORGANIZED HEALTH CARE EDUCATION/TRAINING PROGRAM

## 2025-05-06 PROCEDURE — 97530 THERAPEUTIC ACTIVITIES: CPT

## 2025-05-06 PROCEDURE — 97165 OT EVAL LOW COMPLEX 30 MIN: CPT

## 2025-05-06 PROCEDURE — 83735 ASSAY OF MAGNESIUM: CPT

## 2025-05-06 PROCEDURE — 94640 AIRWAY INHALATION TREATMENT: CPT

## 2025-05-06 PROCEDURE — P9047 ALBUMIN (HUMAN), 25%, 50ML: HCPCS | Performed by: STUDENT IN AN ORGANIZED HEALTH CARE EDUCATION/TRAINING PROGRAM

## 2025-05-06 PROCEDURE — 36415 COLL VENOUS BLD VENIPUNCTURE: CPT

## 2025-05-06 PROCEDURE — 83690 ASSAY OF LIPASE: CPT

## 2025-05-06 PROCEDURE — 97161 PT EVAL LOW COMPLEX 20 MIN: CPT

## 2025-05-06 PROCEDURE — 94761 N-INVAS EAR/PLS OXIMETRY MLT: CPT

## 2025-05-06 PROCEDURE — 1100000000 HC RM PRIVATE

## 2025-05-06 PROCEDURE — 84484 ASSAY OF TROPONIN QUANT: CPT

## 2025-05-06 PROCEDURE — 80053 COMPREHEN METABOLIC PANEL: CPT

## 2025-05-06 RX ORDER — ALBUMIN (HUMAN) 12.5 G/50ML
25 SOLUTION INTRAVENOUS DAILY
Status: COMPLETED | OUTPATIENT
Start: 2025-05-06 | End: 2025-05-08

## 2025-05-06 RX ORDER — MAGNESIUM SULFATE IN WATER 40 MG/ML
2000 INJECTION, SOLUTION INTRAVENOUS ONCE
Status: COMPLETED | OUTPATIENT
Start: 2025-05-06 | End: 2025-05-06

## 2025-05-06 RX ORDER — ALBUMIN (HUMAN) 12.5 G/50ML
25 SOLUTION INTRAVENOUS DAILY PRN
Status: DISCONTINUED | OUTPATIENT
Start: 2025-05-06 | End: 2025-05-06

## 2025-05-06 RX ORDER — FUROSEMIDE 10 MG/ML
40 INJECTION INTRAMUSCULAR; INTRAVENOUS DAILY
Status: COMPLETED | OUTPATIENT
Start: 2025-05-06 | End: 2025-05-08

## 2025-05-06 RX ORDER — LIDOCAINE 4 G/G
1 PATCH TOPICAL EVERY 24 HOURS
Status: DISCONTINUED | OUTPATIENT
Start: 2025-05-06 | End: 2025-05-10 | Stop reason: HOSPADM

## 2025-05-06 RX ADMIN — Medication 2 PUFF: at 09:06

## 2025-05-06 RX ADMIN — FERROUS SULFATE TAB 325 MG (65 MG ELEMENTAL FE) 325 MG: 325 (65 FE) TAB at 08:59

## 2025-05-06 RX ADMIN — FERROUS SULFATE TAB 325 MG (65 MG ELEMENTAL FE) 325 MG: 325 (65 FE) TAB at 15:53

## 2025-05-06 RX ADMIN — METOPROLOL SUCCINATE 50 MG: 50 TABLET, EXTENDED RELEASE ORAL at 08:59

## 2025-05-06 RX ADMIN — SPIRONOLACTONE 50 MG: 25 TABLET ORAL at 08:59

## 2025-05-06 RX ADMIN — OXYCODONE HYDROCHLORIDE 10 MG: 10 TABLET ORAL at 06:50

## 2025-05-06 RX ADMIN — Medication 400 MG: at 21:18

## 2025-05-06 RX ADMIN — FUROSEMIDE 40 MG: 10 INJECTION, SOLUTION INTRAMUSCULAR; INTRAVENOUS at 17:13

## 2025-05-06 RX ADMIN — INSULIN LISPRO 2 UNITS: 100 INJECTION, SOLUTION INTRAVENOUS; SUBCUTANEOUS at 15:53

## 2025-05-06 RX ADMIN — PREGABALIN 75 MG: 75 CAPSULE ORAL at 08:59

## 2025-05-06 RX ADMIN — METOPROLOL SUCCINATE 50 MG: 50 TABLET, EXTENDED RELEASE ORAL at 21:18

## 2025-05-06 RX ADMIN — SODIUM CHLORIDE, PRESERVATIVE FREE 10 ML: 5 INJECTION INTRAVENOUS at 21:20

## 2025-05-06 RX ADMIN — PANTOPRAZOLE SODIUM 40 MG: 40 TABLET, DELAYED RELEASE ORAL at 06:39

## 2025-05-06 RX ADMIN — OXYCODONE HYDROCHLORIDE 10 MG: 10 TABLET ORAL at 15:56

## 2025-05-06 RX ADMIN — ALBUMIN (HUMAN) 25 G: 0.25 INJECTION, SOLUTION INTRAVENOUS at 15:52

## 2025-05-06 RX ADMIN — PANCRELIPASE LIPASE, PANCRELIPASE PROTEASE, PANCRELIPASE AMYLASE 5000 UNITS: 5000; 17000; 24000 CAPSULE, DELAYED RELEASE ORAL at 10:20

## 2025-05-06 RX ADMIN — FUROSEMIDE 40 MG: 40 TABLET ORAL at 08:59

## 2025-05-06 RX ADMIN — Medication 2 PUFF: at 19:34

## 2025-05-06 RX ADMIN — PREGABALIN 75 MG: 75 CAPSULE ORAL at 21:18

## 2025-05-06 RX ADMIN — INSULIN LISPRO 2 UNITS: 100 INJECTION, SOLUTION INTRAVENOUS; SUBCUTANEOUS at 12:25

## 2025-05-06 RX ADMIN — PANCRELIPASE LIPASE, PANCRELIPASE PROTEASE, PANCRELIPASE AMYLASE 5000 UNITS: 5000; 17000; 24000 CAPSULE, DELAYED RELEASE ORAL at 15:53

## 2025-05-06 RX ADMIN — CEFTRIAXONE SODIUM 1000 MG: 1 INJECTION, POWDER, FOR SOLUTION INTRAMUSCULAR; INTRAVENOUS at 15:54

## 2025-05-06 RX ADMIN — INSULIN LISPRO 2 UNITS: 100 INJECTION, SOLUTION INTRAVENOUS; SUBCUTANEOUS at 21:18

## 2025-05-06 RX ADMIN — MAGNESIUM SULFATE HEPTAHYDRATE 2000 MG: 40 INJECTION, SOLUTION INTRAVENOUS at 08:59

## 2025-05-06 RX ADMIN — TAMSULOSIN HYDROCHLORIDE 0.4 MG: 0.4 CAPSULE ORAL at 08:59

## 2025-05-06 RX ADMIN — SODIUM CHLORIDE, PRESERVATIVE FREE 10 ML: 5 INJECTION INTRAVENOUS at 08:59

## 2025-05-06 RX ADMIN — Medication 400 MG: at 08:59

## 2025-05-06 RX ADMIN — OXYCODONE HYDROCHLORIDE 10 MG: 10 TABLET ORAL at 21:18

## 2025-05-06 RX ADMIN — Medication 3 MG: at 21:18

## 2025-05-06 ASSESSMENT — ENCOUNTER SYMPTOMS
ABDOMINAL PAIN: 1
BACK PAIN: 0
SHORTNESS OF BREATH: 0
DIARRHEA: 0
NAUSEA: 0
COUGH: 0
VOMITING: 0

## 2025-05-06 ASSESSMENT — PAIN DESCRIPTION - LOCATION
LOCATION: CHEST;BACK;STERNUM
LOCATION: STERNUM;ABDOMEN;BACK

## 2025-05-06 ASSESSMENT — COPD QUESTIONNAIRES
QUESTION7_SLEEPQUALITY: 2
QUESTION3_CHESTTIGHTNESS: 2
QUESTION6_LEAVINGHOUSE: 2
QUESTION1_COUGHFREQUENCY: 1
QUESTION2_CHESTPHLEGM: 1
CAT_TOTALSCORE: 16
QUESTION4_WALKINCLINE: 3
QUESTION8_ENERGYLEVEL: 3
QUESTION5_HOMEACTIVITIES: 2
TOTAL_EXACERBATIONS_PASTYEAR: 0

## 2025-05-06 ASSESSMENT — PAIN SCALES - GENERAL
PAINLEVEL_OUTOF10: 6
PAINLEVEL_OUTOF10: 9
PAINLEVEL_OUTOF10: 10
PAINLEVEL_OUTOF10: 3
PAINLEVEL_OUTOF10: 7

## 2025-05-06 ASSESSMENT — PAIN - FUNCTIONAL ASSESSMENT: PAIN_FUNCTIONAL_ASSESSMENT: ACTIVITIES ARE NOT PREVENTED

## 2025-05-06 ASSESSMENT — PAIN DESCRIPTION - ORIENTATION
ORIENTATION: RIGHT
ORIENTATION: POSTERIOR;ANTERIOR
ORIENTATION: RIGHT

## 2025-05-06 ASSESSMENT — PAIN DESCRIPTION - DESCRIPTORS
DESCRIPTORS: ACHING
DESCRIPTORS: ACHING;DISCOMFORT
DESCRIPTORS: ACHING;BURNING

## 2025-05-06 ASSESSMENT — PAIN SCALES - WONG BAKER: WONGBAKER_NUMERICALRESPONSE: NO HURT

## 2025-05-06 NOTE — PROGRESS NOTES
Pulmonary Disease Navigator Note  [facility]    Current GOLD classification for Santos Laughlin    Patient's chart was reviewed by Pulmonary Disease Navigator for compliance with prescribed treatment with Global Initiative For Chronic Obstructive Lung Disease (GOLD).    Please, review beneath recommendations for pharmacological treatment for patient with obstructive lung disease.     Current Pharmacological Treatment:      Mr Laughlin is currently receiving Symbicort -4.5 mcg BID  Current eosinophil count: 0.26  Recorded domestic exacerbations past 12 months: 0      The CAT provides a reliable measure of the impact of COPD on a patient's health status. Range of CAT scores from 0-40. Higher scores denote a more severe impact of COPD on a patient’s life. Scores <10 have a low impact, 10-20 medium, 21-30 high and >30 very high impact, requiring gradually more interventions.     Current recorded COPD Assessment Tool (CAT) score of  Cough Assessment: 1  Phlegm Assessment: 1  Chest tightness: 2  Walking on an incline: 3  Home Activities: 2  Confident Leaving The Home: 0  Sleeping Soundly: 2  Have Energy: 3  Assessment Score: 14       Comments:     Mr Laughlin presents with history of pancreatitis, COPD, DM, esophageal varices, HTN, a-fib, liver gallbladder and bile-duct cancer.  Mr Laughlin resides at [facility] and is not a candidate for COPD educator program.  Current facility respiratory orders mirror his inpatient orders.      Time spent in review and at bedside with patient: 30 minutes  Electronically signed by Rosa Petty RRT on 5/6/25 at 10:07 AM EDT

## 2025-05-06 NOTE — PROGRESS NOTES
Progress Note      5/6/2025 10:04 AM  NAME: Santos Laughlin   MRN:  [id number]   Admit Diagnosis: Anemia, iron deficiency [D50.9]  New onset atrial fibrillation (HCC) [I48.91]  Anemia, unspecified type [D64.9]      Problem List:   - Admitted to the hospital with abdominal pain  - Cardiology consult was invited secondary to atrial fibrillation.  - Diabetes mellitus  - Hypertension  - Bilateral leg swelling and 3+ pedal edema  - COPD  - Chronic pancreatitis  - Chronic back  - Former smoker  - History of alcohol abuse  -Multiple cysts in the pancreas and a pseudocyst imposing effect on the left hepatic lobe.  -Right middle lobe pneumonia     Normal ejection fraction of 62% as of 3/3/2025       Assessment/Plan:   Note dictated May 6, 2025  -Follow-up visit from cardiology.  Patient is lying comfortably in the bed.  -Monitor shows atrial fibrillation with controlled heart rate in 70s to 90s..  - Will continue current conservative medical management as patient has normal ejection fraction of 60 to 65% and getting treatment for pneumonia and heart rhythm could be secondary to underlying infection.  -Based on my current clinical assessment no further cardiovascular testing is warranted and will continue medical management at this time.  ===============================================================  Note dictated May 5, 2025  -Follow-up visit from cardiology over the weekend.  Patient is lying comfortably in the bed.  -Monitor shows atrial fibrillation with heart rate in 90s telemetry reviewed personally which shows heart rate between 68 to 128 bpm  -I will take the liberty to increase metoprolol XL to 50 mg p.o. twice daily based on hemodynamics.  -I will also recommend compression stockings for his leg swelling as it is of noncardiogenic origin.  -Will continue to follow and make further cardiovascular management decision as clinically warranted while he is in the hospital along with the

## 2025-05-06 NOTE — CARE COORDINATION
Patient not sable for discharge d/t elevated WBC.  Stat troponin ordered.  CXR ordered.      Awaiting delivery of DME (wheelchair).      Discharge disposition is home w/daughter; relocating to [place], [place]. Daughter will transport back to [place], [place].       TEDDY Hines

## 2025-05-06 NOTE — PLAN OF CARE
OCCUPATIONAL THERAPY EVALUATION  Patient: Santos Laughlin (70 y.o. male)  Date: 5/6/2025  Primary Diagnosis: Anemia, iron deficiency [D50.9]  New onset atrial fibrillation (HCC) [I48.91]  Anemia, unspecified type [D64.9]       Precautions: Fall Risk                Recommendations for nursing mobility: Out of bed to chair for meals, Encourage HEP in prep for ADLs/mobility; see handout for details, Frequent repositioning to prevent skin breakdown, Use of bed/chair alarm for safety, AD and gt belt for bed to chair , Amb to bathroom with AD and gait belt, and Assist x2    In place during session:EKG/telemetry   ASSESSMENT  Pt is a 70 y.o. male presenting to [facility] with c/o HGB 6.3 with chronic anemia, admitted 5/1/2025 and currently being treated for anemia, DM 2, chronic pancreatitis, liver cirrhosis. Pt received semi-supine in bed upon arrival, AXO x 4, and agreeable to OT evaluation.     Based on current observations, pt presents with decreased  functional mobility, independence in ADLs, high-level IADLs, ROM, strength, body mechanics, activity tolerance, safety awareness, command following, balance (see below for objective details and assist levels).     Overall, pt tolerates session fair with c/o 9/10 chest pain with activity (RN aware). Pt req'd mod A for bed mobility and mod Ax2 for OOB functional mobility with RW in prep for toileting. Toilet transfer completed with min-mod A with VC to use grab bar and supervision for seated urination. VC provided for hand placement during all transfers. Pt SOB and req'd additional time for functional mobility. SpO2 WFL, see below. Pt will benefit from continued skilled OT services to address current impairments and improve IND and safety with self cares and functional transfers/mobility. Current OT d/c recommendation Moderate intensity short-term skilled occupational therapy up to 5x/week once medically appropriate.     Start of Session End of Session   SPO2 (%) 98 98   Heart       Based on the above components, the patient evaluation is determined to be of the following complexity level: Low    Pain Ratin/10 chest pain  Pain Intervention(s):   nursing notified    Activity Tolerance:   Fair     After treatment patient left in no apparent distress:    Patient left in no apparent distress in bed, Call bell within reach, Bed/ chair alarm activated, Caregiver / family present, and Side rails x3, bed locked and in lowest position    COMMUNICATION/EDUCATION:   The patient’s plan of care was discussed with: Physical therapist and Registered nurse    Patient Education  Education Given To: Patient  Education Provided: Role of Therapy;Plan of Care;ADL Adaptive Strategies;Transfer Training;Mobility Training;Fall Prevention Strategies  Education Method: Verbal  Barriers to Learning: Cognition  Education Outcome: Continued education needed;Verbalized understanding;Demonstrated understanding    The supervising occupational therapist and treating occupational therapist assistant have met to review this patient’s progress and plan of care.    This patient’s plan of care is appropriate for delegation to AYAH.     PT/OT sessions occurred together for increased safety of pt and clinician.     Thank you for this referral,  Jennifer Vences OT  Minutes: 23

## 2025-05-06 NOTE — PLAN OF CARE
Problem: Chronic Conditions and Co-morbidities  Goal: Patient's chronic conditions and co-morbidity symptoms are monitored and maintained or improved  5/6/2025 0952 by Cheryle Luna RN  Outcome: Progressing  Flowsheets (Taken 5/6/2025 0930)  Care Plan - Patient's Chronic Conditions and Co-Morbidity Symptoms are Monitored and Maintained or Improved: Monitor and assess patient's chronic conditions and comorbid symptoms for stability, deterioration, or improvement  5/6/2025 0057 by Dee Dee Dick RN  Outcome: Progressing  Flowsheets (Taken 5/5/2025 2007)  Care Plan - Patient's Chronic Conditions and Co-Morbidity Symptoms are Monitored and Maintained or Improved: Monitor and assess patient's chronic conditions and comorbid symptoms for stability, deterioration, or improvement     Problem: Discharge Planning  Goal: Discharge to home or other facility with appropriate resources  5/6/2025 0952 by Cheryle Luna RN  Outcome: Progressing  Flowsheets (Taken 5/6/2025 0930)  Discharge to home or other facility with appropriate resources: Identify barriers to discharge with patient and caregiver  5/6/2025 0057 by Dee Dee Dick RN  Outcome: Progressing  Flowsheets (Taken 5/5/2025 2007)  Discharge to home or other facility with appropriate resources:   Identify barriers to discharge with patient and caregiver   Identify discharge learning needs (meds, wound care, etc)     Problem: Skin/Tissue Integrity  Goal: Skin integrity remains intact  Description: 1.  Monitor for areas of redness and/or skin breakdown2.  Assess vascular access sites hourly3.  Every 4-6 hours minimum:  Change oxygen saturation probe site4.  Every 4-6 hours:  If on nasal continuous positive airway pressure, respiratory therapy assess nares and determine need for appliance change or resting period  5/6/2025 0952 by Cheryle Luna, RN  Outcome: Progressing  Flowsheets (Taken 5/6/2025 0930)  Skin Integrity Remains Intact:   Monitor for areas of redness

## 2025-05-06 NOTE — PROGRESS NOTES
Hospitalist Progress Note    NAME:   Santos Laughlin   : 1955   MRN: [id number]     Date/Time: 2025 10:34 AM  Patient PCP: Nitish Dela Cruz DO    Estimated discharge date:24-48hrs  Barriers: stable Hgb, IV abx, PT/OT    Hospital course:  Mr. Laughlin is a 70 year old male with hx of chronic pancreatitis, cirrhosis, recently discharged from here with 2 weeks of IV merrem on 2025 for sepsis suspected due to complex hepatic cyst who presented to the emergency department for generalized weakness.  Patient resides at [facility] and was found to have hemoglobin 6.3 subsequently transferred here for further workup and evaluation.    On initial presentation patient hypertensive, tachycardic.  Repeat CBC with hemoglobin 6.3.  1 unit PRBC ordered.  Stool occult negative.  INR 1.6.  CMP with potassium 3.3, renal function with normal limits.  BNP 4285.  Troponin negative. Of note EKG with Afib with RVR. We were asked to admit for further workup and evaluation of acute on chronic anemia. Hgb improved 7.4. Cardiology consulted given new onset Afib. Repeat TTE ordered with preserved EF 55 to 60%.  Metoprolol adjusted to 50 mg twice daily.      Patient hypoglycemic morning , asymptomatic.  Lantus held. Blood sugars stable. Patient tolerating diet.  Iron studies suggestive of anemia of chronic disease with iron deficiency, oral iron supplementation initiated and hemoglobin has remained stable.    Patient with pitting edema bilaterally.  Bilateral lower extremity duplex negative.  Increase spironolactone once patient started on Lasix. Patient still with persistent lower extremity pitting edema. Start IV lasix with albumin to maximize diuresis.     Hgb downtrended to 6.9 5/5. 1U PRBC ordered. Oral iron increased to BID. PT/OT ordered for evaluation as patient has been unable to ambulate on his own. PT/OT recommending SNF. Hemoglobin improved to 7.8. Patient noted to have increasing leukocytosis,

## 2025-05-06 NOTE — PLAN OF CARE
PHYSICAL THERAPY EVALUATION  Patient: Santos Laughlin (70 y.o. male)  Date: 5/6/2025  Primary Diagnosis: Anemia, iron deficiency [D50.9]  New onset atrial fibrillation (HCC) [I48.91]  Anemia, unspecified type [D64.9]       Precautions: Fall Risk                      Recommendations for nursing mobility: Encourage HEP in prep for ADLs/mobility; see handout for details, Frequent repositioning to prevent skin breakdown, LE elevation for management of edema, AD and gt belt for bed to chair , Amb to bathroom with AD and gait belt, and Assist x2    In place during session: EKG/telemetry     ASSESSMENT  Pt is a 70 y.o. male admitted on 5/1/2025 for generalized weakness and Hgb at 6.3 at [facility]; pt currently being treated for acute on chronic anemia, Afib, chronic pancreatitis. Pt semi-supine upon PT arrival, agreeable to evaluation. Pt A&O x 4.  Pt reports no falls in the past 3 months.    Based on the objective data described below, the patient currently presents with impaired functional mobility, decreased ROM, impaired strength, decreased activity tolerance, impaired balance, and increased pain levels. (See below for objective details and assist levels).     Overall pt tolerated session fair today with 9/10 pain in chest with movement. (RN aware) Pt required mod A for bed mobility and mod A x 2 for transfers. Pt amb 25 feet (12ft+13ft+) with RW, gt belt and CGA; demonstrates slow liya, decreased step length and wide LEE.  Pt had a sitting rest break on the toilet before heading back to the bed. Pt requiring cues to keep walker in close proximity to him and to increase step length. Pt denies any dizziness or lightheadedness with sitting or standing today.  Pt moves slowly with gait and is unsteady requiring close guarding for safety.  Spoke with pt's daughter who states she is a nurse and not at home during the day due to work.  She is concerned about pt climbing the 3 steps into the house and needs pt

## 2025-05-06 NOTE — PLAN OF CARE
Problem: Neurosensory - Adult  Goal: Achieves maximal functionality and self care  Outcome: Not Progressing  Flowsheets  Taken 5/6/2025 0057  Achieves maximal functionality and self care:   Monitor swallowing and airway patency with patient fatigue and changes in neurological status   Encourage and assist patient to increase activity and self care with guidance from physical therapy/occupational therapy  Taken 5/5/2025 2007  Achieves maximal functionality and self care:   Monitor swallowing and airway patency with patient fatigue and changes in neurological status   Encourage and assist patient to increase activity and self care with guidance from physical therapy/occupational therapy     Problem: Chronic Conditions and Co-morbidities  Goal: Patient's chronic conditions and co-morbidity symptoms are monitored and maintained or improved  Outcome: Progressing  Flowsheets (Taken 5/5/2025 2007)  Care Plan - Patient's Chronic Conditions and Co-Morbidity Symptoms are Monitored and Maintained or Improved: Monitor and assess patient's chronic conditions and comorbid symptoms for stability, deterioration, or improvement     Problem: Discharge Planning  Goal: Discharge to home or other facility with appropriate resources  Outcome: Progressing  Flowsheets (Taken 5/5/2025 2007)  Discharge to home or other facility with appropriate resources:   Identify barriers to discharge with patient and caregiver   Identify discharge learning needs (meds, wound care, etc)     Problem: Skin/Tissue Integrity  Goal: Skin integrity remains intact  Description: 1.  Monitor for areas of redness and/or skin breakdown2.  Assess vascular access sites hourly3.  Every 4-6 hours minimum:  Change oxygen saturation probe site4.  Every 4-6 hours:  If on nasal continuous positive airway pressure, respiratory therapy assess nares and determine need for appliance change or resting period  Outcome: Progressing  Flowsheets (Taken 5/5/2025 2007)  Skin

## 2025-05-06 NOTE — CARE COORDINATION
NICKO spoke with Daughter Jyoti Laughlin at nurses station.  She would like her father placed at a SNF in [place].  Choice letter given to Ms. Laughlin.  Freedom of choice given for [place], [place], [facility].  Patient does not need insurance authorization.  Jyoti Laughlin states she will be back on Friday morning if we could arrange transport for that day.

## 2025-05-07 ENCOUNTER — APPOINTMENT (OUTPATIENT)
Facility: HOSPITAL | Age: 70
DRG: 812 | End: 2025-05-07
Payer: MEDICARE

## 2025-05-07 LAB
ALBUMIN SERPL-MCNC: 1.6 G/DL (ref 3.5–5)
ALBUMIN/GLOB SERPL: 0.3 (ref 1.1–2.2)
ALP SERPL-CCNC: 114 U/L (ref 45–117)
ALT SERPL-CCNC: 7 U/L (ref 12–78)
ANION GAP SERPL CALC-SCNC: 4 MMOL/L (ref 2–12)
AST SERPL W P-5'-P-CCNC: 9 U/L (ref 15–37)
BASOPHILS # BLD: 0.03 K/UL (ref 0–0.1)
BASOPHILS NFR BLD: 0.3 % (ref 0–1)
BILIRUB SERPL-MCNC: 0.4 MG/DL (ref 0.2–1)
BUN SERPL-MCNC: 8 MG/DL (ref 6–20)
BUN/CREAT SERPL: 14 (ref 12–20)
CA-I BLD-MCNC: 7.8 MG/DL (ref 8.5–10.1)
CHLORIDE SERPL-SCNC: 100 MMOL/L (ref 97–108)
CO2 SERPL-SCNC: 29 MMOL/L (ref 21–32)
CREAT SERPL-MCNC: 0.57 MG/DL (ref 0.7–1.3)
DIFFERENTIAL METHOD BLD: ABNORMAL
EOSINOPHIL # BLD: 0.2 K/UL (ref 0–0.4)
EOSINOPHIL NFR BLD: 1.8 % (ref 0–7)
ERYTHROCYTE [DISTWIDTH] IN BLOOD BY AUTOMATED COUNT: 16.9 % (ref 11.5–14.5)
GLOBULIN SER CALC-MCNC: 4.9 G/DL (ref 2–4)
GLUCOSE BLD STRIP.AUTO-MCNC: 146 MG/DL (ref 65–100)
GLUCOSE BLD STRIP.AUTO-MCNC: 197 MG/DL (ref 65–100)
GLUCOSE BLD STRIP.AUTO-MCNC: 282 MG/DL (ref 65–100)
GLUCOSE BLD STRIP.AUTO-MCNC: 70 MG/DL (ref 65–100)
GLUCOSE SERPL-MCNC: 62 MG/DL (ref 65–100)
HCT VFR BLD AUTO: 24.8 % (ref 36.6–50.3)
HGB BLD-MCNC: 7.6 G/DL (ref 12.1–17)
IMM GRANULOCYTES # BLD AUTO: 0.04 K/UL (ref 0–0.04)
IMM GRANULOCYTES NFR BLD AUTO: 0.4 % (ref 0–0.5)
LYMPHOCYTES # BLD: 1.51 K/UL (ref 0.8–3.5)
LYMPHOCYTES NFR BLD: 13.9 % (ref 12–49)
MAGNESIUM SERPL-MCNC: 1.2 MG/DL (ref 1.6–2.4)
MCH RBC QN AUTO: 27.5 PG (ref 26–34)
MCHC RBC AUTO-ENTMCNC: 30.6 G/DL (ref 30–36.5)
MCV RBC AUTO: 89.9 FL (ref 80–99)
MONOCYTES # BLD: 1.26 K/UL (ref 0–1)
MONOCYTES NFR BLD: 11.6 % (ref 5–13)
NEUTS SEG # BLD: 7.81 K/UL (ref 1.8–8)
NEUTS SEG NFR BLD: 72 % (ref 32–75)
NRBC # BLD: 0 K/UL (ref 0–0.01)
NRBC BLD-RTO: 0 PER 100 WBC
PERFORMED BY:: ABNORMAL
PERFORMED BY:: NORMAL
PLATELET # BLD AUTO: 247 K/UL (ref 150–400)
PMV BLD AUTO: 10.2 FL (ref 8.9–12.9)
POTASSIUM SERPL-SCNC: 3.2 MMOL/L (ref 3.5–5.1)
PROCALCITONIN SERPL-MCNC: 0.08 NG/ML
PROT SERPL-MCNC: 6.5 G/DL (ref 6.4–8.2)
RBC # BLD AUTO: 2.76 M/UL (ref 4.1–5.7)
SODIUM SERPL-SCNC: 133 MMOL/L (ref 136–145)
WBC # BLD AUTO: 10.9 K/UL (ref 4.1–11.1)

## 2025-05-07 PROCEDURE — 6360000004 HC RX CONTRAST MEDICATION: Performed by: STUDENT IN AN ORGANIZED HEALTH CARE EDUCATION/TRAINING PROGRAM

## 2025-05-07 PROCEDURE — 6370000000 HC RX 637 (ALT 250 FOR IP): Performed by: STUDENT IN AN ORGANIZED HEALTH CARE EDUCATION/TRAINING PROGRAM

## 2025-05-07 PROCEDURE — 84145 PROCALCITONIN (PCT): CPT

## 2025-05-07 PROCEDURE — 6360000002 HC RX W HCPCS: Performed by: STUDENT IN AN ORGANIZED HEALTH CARE EDUCATION/TRAINING PROGRAM

## 2025-05-07 PROCEDURE — 2500000003 HC RX 250 WO HCPCS: Performed by: HOSPITALIST

## 2025-05-07 PROCEDURE — 6370000000 HC RX 637 (ALT 250 FOR IP): Performed by: HOSPITALIST

## 2025-05-07 PROCEDURE — 97530 THERAPEUTIC ACTIVITIES: CPT

## 2025-05-07 PROCEDURE — 80053 COMPREHEN METABOLIC PANEL: CPT

## 2025-05-07 PROCEDURE — 83735 ASSAY OF MAGNESIUM: CPT

## 2025-05-07 PROCEDURE — 94640 AIRWAY INHALATION TREATMENT: CPT

## 2025-05-07 PROCEDURE — 1100000000 HC RM PRIVATE

## 2025-05-07 PROCEDURE — 2500000003 HC RX 250 WO HCPCS: Performed by: STUDENT IN AN ORGANIZED HEALTH CARE EDUCATION/TRAINING PROGRAM

## 2025-05-07 PROCEDURE — 74178 CT ABD&PLV WO CNTR FLWD CNTR: CPT

## 2025-05-07 PROCEDURE — 85025 COMPLETE CBC W/AUTO DIFF WBC: CPT

## 2025-05-07 PROCEDURE — 6370000000 HC RX 637 (ALT 250 FOR IP): Performed by: INTERNAL MEDICINE

## 2025-05-07 PROCEDURE — 82962 GLUCOSE BLOOD TEST: CPT

## 2025-05-07 PROCEDURE — P9047 ALBUMIN (HUMAN), 25%, 50ML: HCPCS | Performed by: STUDENT IN AN ORGANIZED HEALTH CARE EDUCATION/TRAINING PROGRAM

## 2025-05-07 PROCEDURE — 36415 COLL VENOUS BLD VENIPUNCTURE: CPT

## 2025-05-07 PROCEDURE — 94761 N-INVAS EAR/PLS OXIMETRY MLT: CPT

## 2025-05-07 RX ORDER — LACTOBACILLUS RHAMNOSUS GG 10B CELL
1 CAPSULE ORAL
Status: DISCONTINUED | OUTPATIENT
Start: 2025-05-07 | End: 2025-05-10 | Stop reason: HOSPADM

## 2025-05-07 RX ORDER — LOPERAMIDE HYDROCHLORIDE 2 MG/1
2 CAPSULE ORAL 4 TIMES DAILY PRN
Status: DISCONTINUED | OUTPATIENT
Start: 2025-05-07 | End: 2025-05-10 | Stop reason: HOSPADM

## 2025-05-07 RX ORDER — IOPAMIDOL 755 MG/ML
100 INJECTION, SOLUTION INTRAVASCULAR
Status: COMPLETED | OUTPATIENT
Start: 2025-05-07 | End: 2025-05-07

## 2025-05-07 RX ORDER — POTASSIUM CHLORIDE 1500 MG/1
40 TABLET, EXTENDED RELEASE ORAL 2 TIMES DAILY
Status: DISCONTINUED | OUTPATIENT
Start: 2025-05-07 | End: 2025-05-10 | Stop reason: HOSPADM

## 2025-05-07 RX ORDER — MAGNESIUM SULFATE IN WATER 40 MG/ML
4000 INJECTION, SOLUTION INTRAVENOUS ONCE
Status: COMPLETED | OUTPATIENT
Start: 2025-05-07 | End: 2025-05-07

## 2025-05-07 RX ADMIN — INSULIN LISPRO 4 UNITS: 100 INJECTION, SOLUTION INTRAVENOUS; SUBCUTANEOUS at 17:20

## 2025-05-07 RX ADMIN — FUROSEMIDE 40 MG: 10 INJECTION, SOLUTION INTRAMUSCULAR; INTRAVENOUS at 09:38

## 2025-05-07 RX ADMIN — MAGNESIUM SULFATE HEPTAHYDRATE 4000 MG: 40 INJECTION, SOLUTION INTRAVENOUS at 11:41

## 2025-05-07 RX ADMIN — LOPERAMIDE HYDROCHLORIDE 2 MG: 2 CAPSULE ORAL at 21:04

## 2025-05-07 RX ADMIN — INSULIN LISPRO 2 UNITS: 100 INJECTION, SOLUTION INTRAVENOUS; SUBCUTANEOUS at 21:05

## 2025-05-07 RX ADMIN — Medication 3 MG: at 21:04

## 2025-05-07 RX ADMIN — SODIUM CHLORIDE, PRESERVATIVE FREE 10 ML: 5 INJECTION INTRAVENOUS at 21:05

## 2025-05-07 RX ADMIN — OXYCODONE HYDROCHLORIDE 10 MG: 10 TABLET ORAL at 21:04

## 2025-05-07 RX ADMIN — OXYCODONE HYDROCHLORIDE 10 MG: 10 TABLET ORAL at 09:38

## 2025-05-07 RX ADMIN — Medication 2 PUFF: at 09:01

## 2025-05-07 RX ADMIN — Medication 2 PUFF: at 19:32

## 2025-05-07 RX ADMIN — PANTOPRAZOLE SODIUM 40 MG: 40 TABLET, DELAYED RELEASE ORAL at 06:24

## 2025-05-07 RX ADMIN — PREGABALIN 75 MG: 75 CAPSULE ORAL at 09:38

## 2025-05-07 RX ADMIN — POTASSIUM CHLORIDE 40 MEQ: 1500 TABLET, EXTENDED RELEASE ORAL at 21:04

## 2025-05-07 RX ADMIN — PANCRELIPASE LIPASE, PANCRELIPASE PROTEASE, PANCRELIPASE AMYLASE 5000 UNITS: 5000; 17000; 24000 CAPSULE, DELAYED RELEASE ORAL at 09:38

## 2025-05-07 RX ADMIN — LOPERAMIDE HYDROCHLORIDE 2 MG: 2 CAPSULE ORAL at 11:37

## 2025-05-07 RX ADMIN — PANCRELIPASE LIPASE, PANCRELIPASE PROTEASE, PANCRELIPASE AMYLASE 5000 UNITS: 5000; 17000; 24000 CAPSULE, DELAYED RELEASE ORAL at 17:22

## 2025-05-07 RX ADMIN — ALBUMIN (HUMAN) 25 G: 0.25 INJECTION, SOLUTION INTRAVENOUS at 09:49

## 2025-05-07 RX ADMIN — METOPROLOL SUCCINATE 50 MG: 50 TABLET, EXTENDED RELEASE ORAL at 09:38

## 2025-05-07 RX ADMIN — IOPAMIDOL 100 ML: 755 INJECTION, SOLUTION INTRAVENOUS at 22:54

## 2025-05-07 RX ADMIN — SPIRONOLACTONE 50 MG: 25 TABLET ORAL at 09:38

## 2025-05-07 RX ADMIN — FERROUS SULFATE TAB 325 MG (65 MG ELEMENTAL FE) 325 MG: 325 (65 FE) TAB at 09:38

## 2025-05-07 RX ADMIN — Medication 1 CAPSULE: at 09:43

## 2025-05-07 RX ADMIN — PREGABALIN 75 MG: 75 CAPSULE ORAL at 21:04

## 2025-05-07 RX ADMIN — METOPROLOL SUCCINATE 50 MG: 50 TABLET, EXTENDED RELEASE ORAL at 21:04

## 2025-05-07 RX ADMIN — PANCRELIPASE LIPASE, PANCRELIPASE PROTEASE, PANCRELIPASE AMYLASE 5000 UNITS: 5000; 17000; 24000 CAPSULE, DELAYED RELEASE ORAL at 12:00

## 2025-05-07 RX ADMIN — CEFTRIAXONE SODIUM 1000 MG: 1 INJECTION, POWDER, FOR SOLUTION INTRAMUSCULAR; INTRAVENOUS at 15:25

## 2025-05-07 RX ADMIN — POTASSIUM CHLORIDE 40 MEQ: 1500 TABLET, EXTENDED RELEASE ORAL at 09:38

## 2025-05-07 RX ADMIN — TAMSULOSIN HYDROCHLORIDE 0.4 MG: 0.4 CAPSULE ORAL at 09:38

## 2025-05-07 RX ADMIN — FERROUS SULFATE TAB 325 MG (65 MG ELEMENTAL FE) 325 MG: 325 (65 FE) TAB at 17:20

## 2025-05-07 RX ADMIN — SODIUM CHLORIDE, PRESERVATIVE FREE 10 ML: 5 INJECTION INTRAVENOUS at 09:43

## 2025-05-07 ASSESSMENT — PAIN - FUNCTIONAL ASSESSMENT: PAIN_FUNCTIONAL_ASSESSMENT: ACTIVITIES ARE NOT PREVENTED

## 2025-05-07 ASSESSMENT — ENCOUNTER SYMPTOMS
ABDOMINAL PAIN: 1
VOMITING: 0
SHORTNESS OF BREATH: 0
NAUSEA: 0
COUGH: 0
BACK PAIN: 0
DIARRHEA: 0

## 2025-05-07 ASSESSMENT — PAIN SCALES - WONG BAKER: WONGBAKER_NUMERICALRESPONSE: NO HURT

## 2025-05-07 ASSESSMENT — PAIN DESCRIPTION - DESCRIPTORS
DESCRIPTORS: DULL;ACHING
DESCRIPTORS: ACHING;DISCOMFORT

## 2025-05-07 ASSESSMENT — PAIN DESCRIPTION - LOCATION
LOCATION: CHEST
LOCATION: BACK

## 2025-05-07 ASSESSMENT — PAIN DESCRIPTION - ORIENTATION
ORIENTATION: MID
ORIENTATION: LOWER

## 2025-05-07 ASSESSMENT — PAIN SCALES - GENERAL
PAINLEVEL_OUTOF10: 3
PAINLEVEL_OUTOF10: 3
PAINLEVEL_OUTOF10: 7
PAINLEVEL_OUTOF10: 8

## 2025-05-07 NOTE — PLAN OF CARE
OCCUPATIONAL THERAPY TREATMENT  Patient: Santos Laughlin (70 y.o. male)  Date: 5/7/2025  Primary Diagnosis: Anemia, iron deficiency [D50.9]  New onset atrial fibrillation (HCC) [I48.91]  Anemia, unspecified type [D64.9]       Precautions: Fall Risk                Recommendations for nursing mobility: Out of bed to chair for meals, Encourage HEP in prep for ADLs/mobility; see handout for details, Frequent repositioning to prevent skin breakdown, AD and gt belt for bed to chair , Amb to bathroom with AD and gait belt, and Assist x1    In place during session: Peripheral IV and EKG/telemetry   Chart, occupational therapy assessment, plan of care, and goals were reviewed.  ASSESSMENT  Patient continues with skilled OT services and is progressing towards goals. Pt semi supine in bed upon ELLIOTT arrival, agreeable to session. Pt A&O x 4. Pt initially apprehensive about working with therapy and required much encouragement and education on POC and benefits of working with therapy.  Pt seen as a partial co-tx d/t pervious levels of assistance. Pt no longer required Ax 2. Pt did required extra time for all mobility. Pt able to complete bed mobility w/ use of side rail and hob elevated w/ sba and completed sts w/ cga and use of rw.   Pt required vc's for proper hand placement to increase safety.  Energy conservation and HEP hand outs given and reviewed w/ pt. Pt encouraged to completed HEP 3 times per day on own. Pt instructed to call for nursing staff when he is ready to go back to bed.  Pt verbalizing good understanding of all education. Pt left seated in recliner with all known needs met.    (See below for objective details and assist levels).     Overall pt tolerated session well today with frequent rest breaks and encouragement. Current OT recommendations for discharge Moderate intensity short-term skilled occupational therapy up to 5x/week. Will continue to benefit from skilled OT services, and will continue to progress as

## 2025-05-07 NOTE — PROGRESS NOTES
Hospitalist Progress Note    NAME:   Santos Laughlin   : 1955   MRN: [id number]     Date/Time: 2025 10:55 AM  Patient PCP: Nitish Dela Cruz DO    Estimated discharge date:24-48hrs  Barriers: IV abx, placement, clinical improvement    Hospital course:  Mr. Laughlin is a 70 year old male with hx of chronic pancreatitis, cirrhosis, recently discharged from here with 2 weeks of IV merrem on 2025 for sepsis suspected due to complex hepatic cyst who presented to the emergency department for generalized weakness.  Patient resides at [facility] [place] and was found to have hemoglobin 6.3 subsequently transferred here for further workup and evaluation.    On initial presentation patient hypertensive, tachycardic.  Repeat CBC with hemoglobin 6.3.  1 unit PRBC ordered.  Stool occult negative.  INR 1.6.  CMP with potassium 3.3, renal function with normal limits.  BNP 4285.  Troponin negative. Of note EKG with Afib with RVR. We were asked to admit for further workup and evaluation of acute on chronic anemia. Hgb improved 7.4. Cardiology consulted given new onset Afib. Repeat TTE ordered with preserved EF 55 to 60%.  Metoprolol adjusted to 50 mg twice daily.      Patient hypoglycemic morning , asymptomatic.  Lantus held. Blood sugars stable. Patient tolerating diet.  Iron studies suggestive of anemia of chronic disease with iron deficiency, oral iron supplementation initiated and hemoglobin has remained stable.    Patient with pitting edema bilaterally.  Bilateral lower extremity duplex negative.  Increase spironolactone once patient started on Lasix. Patient still with persistent lower extremity pitting edema. Start IV lasix with albumin to maximize diuresis.     Hgb downtrended to 6.9 5/5. 1U PRBC ordered. Oral iron increased to BID. PT/OT ordered for evaluation as patient has been unable to ambulate on his own. PT/OT recommending SNF. Hemoglobin improved to 7.8. Patient noted to have increasing  26  --  29 29   BUN 7  --  9 8   MG  --  1.1* 1.2* 1.2*   ALT 6*  --  6* 7*       Signed: Mera Viera PA-C

## 2025-05-07 NOTE — PLAN OF CARE
Problem: Chronic Conditions and Co-morbidities  Goal: Patient's chronic conditions and co-morbidity symptoms are monitored and maintained or improved  5/7/2025 1250 by Tonja Quiñonez RN  Outcome: Progressing  Flowsheets (Taken 5/7/2025 0949)  Care Plan - Patient's Chronic Conditions and Co-Morbidity Symptoms are Monitored and Maintained or Improved: Monitor and assess patient's chronic conditions and comorbid symptoms for stability, deterioration, or improvement  5/7/2025 0143 by Alondra Yoon RN  Outcome: Progressing     Problem: Discharge Planning  Goal: Discharge to home or other facility with appropriate resources  5/7/2025 1250 by Tonja Quiñonez RN  Outcome: Progressing  Flowsheets (Taken 5/7/2025 0949)  Discharge to home or other facility with appropriate resources: Identify barriers to discharge with patient and caregiver  5/7/2025 0143 by Alondra Yoon RN  Outcome: Progressing  Flowsheets (Taken 5/6/2025 2017)  Discharge to home or other facility with appropriate resources:   Identify barriers to discharge with patient and caregiver   Identify discharge learning needs (meds, wound care, etc)   Arrange for needed discharge resources and transportation as appropriate     Problem: Skin/Tissue Integrity  Goal: Skin integrity remains intact  Description: 1.  Monitor for areas of redness and/or skin breakdown2.  Assess vascular access sites hourly3.  Every 4-6 hours minimum:  Change oxygen saturation probe site4.  Every 4-6 hours:  If on nasal continuous positive airway pressure, respiratory therapy assess nares and determine need for appliance change or resting period  5/7/2025 1250 by Tonja Quiñonez RN  Outcome: Progressing  Flowsheets (Taken 5/7/2025 0949)  Skin Integrity Remains Intact: Monitor for areas of redness and/or skin breakdown  5/7/2025 0143 by Alondra Yoon RN  Outcome: Progressing  Flowsheets (Taken 5/6/2025 2017)  Skin Integrity Remains Intact: Monitor for areas of redness

## 2025-05-07 NOTE — PLAN OF CARE
Problem: Chronic Conditions and Co-morbidities  Goal: Patient's chronic conditions and co-morbidity symptoms are monitored and maintained or improved  Outcome: Progressing     Problem: Discharge Planning  Goal: Discharge to home or other facility with appropriate resources  Outcome: Progressing     Problem: Skin/Tissue Integrity  Goal: Skin integrity remains intact  Description: 1.  Monitor for areas of redness and/or skin breakdown2.  Assess vascular access sites hourly3.  Every 4-6 hours minimum:  Change oxygen saturation probe site4.  Every 4-6 hours:  If on nasal continuous positive airway pressure, respiratory therapy assess nares and determine need for appliance change or resting period  Outcome: Progressing     Problem: Safety - Adult  Goal: Free from fall injury  Outcome: Progressing     Problem: Pain  Goal: Verbalizes/displays adequate comfort level or baseline comfort level  Outcome: Progressing     Problem: Neurosensory - Adult  Goal: Achieves stable or improved neurological status  Outcome: Progressing  Goal: Achieves maximal functionality and self care  Outcome: Progressing     Problem: Respiratory - Adult  Goal: Achieves optimal ventilation and oxygenation  Outcome: Progressing     Problem: Cardiovascular - Adult  Goal: Maintains optimal cardiac output and hemodynamic stability  Outcome: Progressing  Goal: Absence of cardiac dysrhythmias or at baseline  Outcome: Progressing     Problem: Skin/Tissue Integrity - Adult  Goal: Skin integrity remains intact  Description: 1.  Monitor for areas of redness and/or skin breakdown2.  Assess vascular access sites hourly3.  Every 4-6 hours minimum:  Change oxygen saturation probe site4.  Every 4-6 hours:  If on nasal continuous positive airway pressure, respiratory therapy assess nares and determine need for appliance change or resting period  Outcome: Progressing  Goal: Incisions, wounds, or drain sites healing without S/S of infection  Outcome:

## 2025-05-07 NOTE — PLAN OF CARE
PHYSICAL THERAPY TREATMENT     Patient: Santos Laughlin (70 y.o. male)  Date: 5/7/2025  Diagnosis: Anemia, iron deficiency [D50.9]  New onset atrial fibrillation (HCC) [I48.91]  Anemia, unspecified type [D64.9] Anemia, iron deficiency      Precautions: Fall Risk                      Recommendations for nursing mobility: Out of bed to chair for meals, Encourage HEP in prep for ADLs/mobility; see handout for details, Frequent repositioning to prevent skin breakdown, AD and gt belt for bed to chair , Amb to bathroom with AD and gait belt, and Assist x1    In place during session: Peripheral IV and EKG/telemetry   Chart, physical therapy assessment, plan of care and goals were reviewed.  ASSESSMENT  Patient continues with skilled PT services and is progressing towards goals. Pt supine in bed upon PT arrival, agreeable to session. (See below for objective details and assist levels).     Overall pt tolerated session fair today. Pt transferred to eob with no hands on assistance. Pt sat eob and demo'd good sitting balance. Before standing from the bed, pt asked to try it himself. Pt stood cga and RW. Pt stood demo'ing fait standing balance once stood for pericare to be completed due to being incontinent of bowel. Pt ambulated ~24 ft with RW and cga. Gait was slow and fairly steady with no lob or knee buckling noted. Pt was seen as a x2 due to assistance required last tx session, pt can now be seen as a x1 due to progression. Pt left sitting in recliner chair and was left with all needs met. Pt given handout on LE therex. Will continue to benefit from skilled PT services, and will continue to progress as tolerated. Current PT DC recommendation Moderate intensity short-term skilled physical therapy up to 5x/week once medically appropriate.    GOALS:  Problem: Physical Therapy - Adult  Goal: By Discharge: Performs mobility at highest level of function for planned discharge setting.  See evaluation for individualized

## 2025-05-07 NOTE — PROGRESS NOTES
4 Eyes Skin Assessment     NAME:  Santos Laughlin  YOB: 1955  MEDICAL RECORD NUMBER:  [id number]    The patient is being assessed for  Other weekly    I agree that at least one RN has performed a thorough Head to Toe Skin Assessment on the patient. ALL assessment sites listed below have been assessed.      Areas assessed by both nurses:    Head, Face, Ears, Shoulders, Back, Chest, Arms, Elbows, Hands, Sacrum. Buttock, Coccyx, Ischium, Legs. Feet and Heels, and Under Medical Devices         Does the Patient have a Wound? Yes wound(s) were present on assessment. LDA wound assessment was Initiated and completed by RN       Jhonatan Prevention initiated by RN: No  Wound Care Orders initiated by RN: Yes    Pressure Injury (Stage 3,4, Unstageable, DTI, NWPT, and Complex wounds) if present, place Wound referral order by RN under : No    New Ostomies, if present place, Ostomy referral order under : No     Nurse 1 eSignature: Electronically signed by Tonja Quiñonez RN on 5/7/25 at 12:51 PM EDT    **SHARE this note so that the co-signing nurse can place an eSignature**    Nurse 2 eSignature: Electronically signed by Guzman Larsen RN on 5/7/25 at 12:56 PM EDT

## 2025-05-07 NOTE — CARE COORDINATION
15:06PM Accepted at SNF ([facility]).  Bed available on Saturday (May 10th, 2025.    Discharge disposition is home w/daughter; relocating to [place], [place]. Daughter will transport back to [place], [place].     TEDDY Hines      10:35AM Manual wheelchair (DME) delivered at the bedside.      Discharge disposition is home w/daughter; relocating to [place], [place]. Daughter will transport back to [place], [place].     TEDDY Hines

## 2025-05-08 LAB
ALBUMIN SERPL-MCNC: 1.8 G/DL (ref 3.5–5)
ALBUMIN/GLOB SERPL: 0.4 (ref 1.1–2.2)
ALP SERPL-CCNC: 129 U/L (ref 45–117)
ALT SERPL-CCNC: 8 U/L (ref 12–78)
ANION GAP SERPL CALC-SCNC: 2 MMOL/L (ref 2–12)
AST SERPL W P-5'-P-CCNC: 11 U/L (ref 15–37)
BASOPHILS # BLD: 0.02 K/UL (ref 0–0.1)
BASOPHILS NFR BLD: 0.2 % (ref 0–1)
BILIRUB SERPL-MCNC: 0.3 MG/DL (ref 0.2–1)
BUN SERPL-MCNC: 8 MG/DL (ref 6–20)
BUN/CREAT SERPL: 13 (ref 12–20)
CA-I BLD-MCNC: 8 MG/DL (ref 8.5–10.1)
CHLORIDE SERPL-SCNC: 100 MMOL/L (ref 97–108)
CO2 SERPL-SCNC: 31 MMOL/L (ref 21–32)
CREAT SERPL-MCNC: 0.64 MG/DL (ref 0.7–1.3)
DIFFERENTIAL METHOD BLD: ABNORMAL
EOSINOPHIL # BLD: 0.26 K/UL (ref 0–0.4)
EOSINOPHIL NFR BLD: 3 % (ref 0–7)
ERYTHROCYTE [DISTWIDTH] IN BLOOD BY AUTOMATED COUNT: 17.2 % (ref 11.5–14.5)
GLOBULIN SER CALC-MCNC: 5.1 G/DL (ref 2–4)
GLUCOSE BLD STRIP.AUTO-MCNC: 126 MG/DL (ref 65–100)
GLUCOSE BLD STRIP.AUTO-MCNC: 131 MG/DL (ref 65–100)
GLUCOSE BLD STRIP.AUTO-MCNC: 148 MG/DL (ref 65–100)
GLUCOSE BLD STRIP.AUTO-MCNC: 226 MG/DL (ref 65–100)
GLUCOSE SERPL-MCNC: 124 MG/DL (ref 65–100)
HCT VFR BLD AUTO: 25.2 % (ref 36.6–50.3)
HGB BLD-MCNC: 7.7 G/DL (ref 12.1–17)
IMM GRANULOCYTES # BLD AUTO: 0.05 K/UL (ref 0–0.04)
IMM GRANULOCYTES NFR BLD AUTO: 0.6 % (ref 0–0.5)
LYMPHOCYTES # BLD: 1.31 K/UL (ref 0.8–3.5)
LYMPHOCYTES NFR BLD: 15.1 % (ref 12–49)
MAGNESIUM SERPL-MCNC: 1.5 MG/DL (ref 1.6–2.4)
MCH RBC QN AUTO: 28.2 PG (ref 26–34)
MCHC RBC AUTO-ENTMCNC: 30.6 G/DL (ref 30–36.5)
MCV RBC AUTO: 92.3 FL (ref 80–99)
MONOCYTES # BLD: 0.89 K/UL (ref 0–1)
MONOCYTES NFR BLD: 10.2 % (ref 5–13)
NEUTS SEG # BLD: 6.16 K/UL (ref 1.8–8)
NEUTS SEG NFR BLD: 70.9 % (ref 32–75)
NRBC # BLD: 0 K/UL (ref 0–0.01)
NRBC BLD-RTO: 0 PER 100 WBC
PERFORMED BY:: ABNORMAL
PLATELET # BLD AUTO: 239 K/UL (ref 150–400)
PMV BLD AUTO: 10 FL (ref 8.9–12.9)
POTASSIUM SERPL-SCNC: 4.3 MMOL/L (ref 3.5–5.1)
PROCALCITONIN SERPL-MCNC: 0.05 NG/ML
PROT SERPL-MCNC: 6.9 G/DL (ref 6.4–8.2)
RBC # BLD AUTO: 2.73 M/UL (ref 4.1–5.7)
SODIUM SERPL-SCNC: 133 MMOL/L (ref 136–145)
TROPONIN I SERPL HS-MCNC: 13 NG/L (ref 0–76)
WBC # BLD AUTO: 8.7 K/UL (ref 4.1–11.1)

## 2025-05-08 PROCEDURE — 82962 GLUCOSE BLOOD TEST: CPT

## 2025-05-08 PROCEDURE — P9047 ALBUMIN (HUMAN), 25%, 50ML: HCPCS | Performed by: STUDENT IN AN ORGANIZED HEALTH CARE EDUCATION/TRAINING PROGRAM

## 2025-05-08 PROCEDURE — 94761 N-INVAS EAR/PLS OXIMETRY MLT: CPT

## 2025-05-08 PROCEDURE — 6360000002 HC RX W HCPCS: Performed by: STUDENT IN AN ORGANIZED HEALTH CARE EDUCATION/TRAINING PROGRAM

## 2025-05-08 PROCEDURE — 2500000003 HC RX 250 WO HCPCS: Performed by: HOSPITALIST

## 2025-05-08 PROCEDURE — 6370000000 HC RX 637 (ALT 250 FOR IP): Performed by: INTERNAL MEDICINE

## 2025-05-08 PROCEDURE — 84484 ASSAY OF TROPONIN QUANT: CPT

## 2025-05-08 PROCEDURE — 93005 ELECTROCARDIOGRAM TRACING: CPT | Performed by: STUDENT IN AN ORGANIZED HEALTH CARE EDUCATION/TRAINING PROGRAM

## 2025-05-08 PROCEDURE — 85025 COMPLETE CBC W/AUTO DIFF WBC: CPT

## 2025-05-08 PROCEDURE — 84145 PROCALCITONIN (PCT): CPT

## 2025-05-08 PROCEDURE — 6370000000 HC RX 637 (ALT 250 FOR IP): Performed by: STUDENT IN AN ORGANIZED HEALTH CARE EDUCATION/TRAINING PROGRAM

## 2025-05-08 PROCEDURE — 94640 AIRWAY INHALATION TREATMENT: CPT

## 2025-05-08 PROCEDURE — 6370000000 HC RX 637 (ALT 250 FOR IP): Performed by: HOSPITALIST

## 2025-05-08 PROCEDURE — 80053 COMPREHEN METABOLIC PANEL: CPT

## 2025-05-08 PROCEDURE — 83735 ASSAY OF MAGNESIUM: CPT

## 2025-05-08 PROCEDURE — 36415 COLL VENOUS BLD VENIPUNCTURE: CPT

## 2025-05-08 PROCEDURE — 1100000000 HC RM PRIVATE

## 2025-05-08 PROCEDURE — 6370000000 HC RX 637 (ALT 250 FOR IP): Performed by: PHYSICIAN ASSISTANT

## 2025-05-08 RX ORDER — CYCLOBENZAPRINE HCL 10 MG
10 TABLET ORAL 3 TIMES DAILY PRN
Status: DISCONTINUED | OUTPATIENT
Start: 2025-05-08 | End: 2025-05-10 | Stop reason: HOSPADM

## 2025-05-08 RX ORDER — QUETIAPINE FUMARATE 25 MG/1
25 TABLET, FILM COATED ORAL NIGHTLY PRN
Status: DISCONTINUED | OUTPATIENT
Start: 2025-05-08 | End: 2025-05-10 | Stop reason: HOSPADM

## 2025-05-08 RX ORDER — HYDROXYZINE PAMOATE 25 MG/1
25 CAPSULE ORAL 3 TIMES DAILY PRN
Status: DISCONTINUED | OUTPATIENT
Start: 2025-05-08 | End: 2025-05-10 | Stop reason: HOSPADM

## 2025-05-08 RX ORDER — MAGNESIUM SULFATE IN WATER 40 MG/ML
2000 INJECTION, SOLUTION INTRAVENOUS ONCE
Status: COMPLETED | OUTPATIENT
Start: 2025-05-08 | End: 2025-05-08

## 2025-05-08 RX ORDER — FUROSEMIDE 40 MG/1
40 TABLET ORAL DAILY
Status: DISCONTINUED | OUTPATIENT
Start: 2025-05-09 | End: 2025-05-10 | Stop reason: HOSPADM

## 2025-05-08 RX ADMIN — Medication 1 CAPSULE: at 08:35

## 2025-05-08 RX ADMIN — OXYCODONE HYDROCHLORIDE 10 MG: 10 TABLET ORAL at 06:54

## 2025-05-08 RX ADMIN — TAMSULOSIN HYDROCHLORIDE 0.4 MG: 0.4 CAPSULE ORAL at 08:35

## 2025-05-08 RX ADMIN — PANCRELIPASE LIPASE, PANCRELIPASE PROTEASE, PANCRELIPASE AMYLASE 5000 UNITS: 5000; 17000; 24000 CAPSULE, DELAYED RELEASE ORAL at 16:19

## 2025-05-08 RX ADMIN — OXYCODONE HYDROCHLORIDE 10 MG: 10 TABLET ORAL at 22:20

## 2025-05-08 RX ADMIN — PREGABALIN 75 MG: 75 CAPSULE ORAL at 08:35

## 2025-05-08 RX ADMIN — PANCRELIPASE LIPASE, PANCRELIPASE PROTEASE, PANCRELIPASE AMYLASE 5000 UNITS: 5000; 17000; 24000 CAPSULE, DELAYED RELEASE ORAL at 12:04

## 2025-05-08 RX ADMIN — POTASSIUM CHLORIDE 40 MEQ: 1500 TABLET, EXTENDED RELEASE ORAL at 08:35

## 2025-05-08 RX ADMIN — INSULIN LISPRO 2 UNITS: 100 INJECTION, SOLUTION INTRAVENOUS; SUBCUTANEOUS at 16:14

## 2025-05-08 RX ADMIN — FUROSEMIDE 40 MG: 10 INJECTION, SOLUTION INTRAMUSCULAR; INTRAVENOUS at 08:35

## 2025-05-08 RX ADMIN — SODIUM CHLORIDE, PRESERVATIVE FREE 10 ML: 5 INJECTION INTRAVENOUS at 08:36

## 2025-05-08 RX ADMIN — POTASSIUM CHLORIDE 40 MEQ: 1500 TABLET, EXTENDED RELEASE ORAL at 20:56

## 2025-05-08 RX ADMIN — Medication 2 PUFF: at 19:21

## 2025-05-08 RX ADMIN — METOPROLOL SUCCINATE 50 MG: 50 TABLET, EXTENDED RELEASE ORAL at 20:56

## 2025-05-08 RX ADMIN — ALBUMIN (HUMAN) 25 G: 0.25 INJECTION, SOLUTION INTRAVENOUS at 08:57

## 2025-05-08 RX ADMIN — PANCRELIPASE LIPASE, PANCRELIPASE PROTEASE, PANCRELIPASE AMYLASE 5000 UNITS: 5000; 17000; 24000 CAPSULE, DELAYED RELEASE ORAL at 08:35

## 2025-05-08 RX ADMIN — LOPERAMIDE HYDROCHLORIDE 2 MG: 2 CAPSULE ORAL at 06:54

## 2025-05-08 RX ADMIN — FERROUS SULFATE TAB 325 MG (65 MG ELEMENTAL FE) 325 MG: 325 (65 FE) TAB at 08:35

## 2025-05-08 RX ADMIN — Medication 3 MG: at 20:56

## 2025-05-08 RX ADMIN — Medication 2 PUFF: at 07:48

## 2025-05-08 RX ADMIN — FERROUS SULFATE TAB 325 MG (65 MG ELEMENTAL FE) 325 MG: 325 (65 FE) TAB at 16:14

## 2025-05-08 RX ADMIN — SPIRONOLACTONE 50 MG: 25 TABLET ORAL at 08:35

## 2025-05-08 RX ADMIN — MAGNESIUM SULFATE HEPTAHYDRATE 2000 MG: 40 INJECTION, SOLUTION INTRAVENOUS at 11:08

## 2025-05-08 RX ADMIN — METOPROLOL SUCCINATE 50 MG: 50 TABLET, EXTENDED RELEASE ORAL at 08:35

## 2025-05-08 RX ADMIN — OXYCODONE HYDROCHLORIDE 10 MG: 10 TABLET ORAL at 16:17

## 2025-05-08 RX ADMIN — PREGABALIN 75 MG: 75 CAPSULE ORAL at 20:56

## 2025-05-08 RX ADMIN — SODIUM CHLORIDE, PRESERVATIVE FREE 10 ML: 5 INJECTION INTRAVENOUS at 21:01

## 2025-05-08 RX ADMIN — DIPHENHYDRAMINE HYDROCHLORIDE 25 MG: 25 CAPSULE ORAL at 21:00

## 2025-05-08 RX ADMIN — PANTOPRAZOLE SODIUM 40 MG: 40 TABLET, DELAYED RELEASE ORAL at 06:54

## 2025-05-08 ASSESSMENT — PAIN DESCRIPTION - LOCATION
LOCATION: BACK;SHOULDER
LOCATION: CHEST;BACK

## 2025-05-08 ASSESSMENT — PAIN DESCRIPTION - ORIENTATION
ORIENTATION: RIGHT;LEFT;MID;LOWER;UPPER
ORIENTATION: LOWER

## 2025-05-08 ASSESSMENT — ENCOUNTER SYMPTOMS
VOMITING: 0
SHORTNESS OF BREATH: 0
COUGH: 0
NAUSEA: 0
ABDOMINAL PAIN: 1
DIARRHEA: 0
BACK PAIN: 0

## 2025-05-08 ASSESSMENT — PAIN DESCRIPTION - DESCRIPTORS
DESCRIPTORS: ACHING;DISCOMFORT
DESCRIPTORS: ACHING;DULL
DESCRIPTORS: ACHING;DISCOMFORT

## 2025-05-08 ASSESSMENT — PAIN SCALES - GENERAL
PAINLEVEL_OUTOF10: 5
PAINLEVEL_OUTOF10: 8
PAINLEVEL_OUTOF10: 7
PAINLEVEL_OUTOF10: 7
PAINLEVEL_OUTOF10: 5
PAINLEVEL_OUTOF10: 4

## 2025-05-08 ASSESSMENT — PAIN - FUNCTIONAL ASSESSMENT
PAIN_FUNCTIONAL_ASSESSMENT: ACTIVITIES ARE NOT PREVENTED
PAIN_FUNCTIONAL_ASSESSMENT: ACTIVITIES ARE NOT PREVENTED

## 2025-05-08 ASSESSMENT — PAIN SCALES - WONG BAKER: WONGBAKER_NUMERICALRESPONSE: NO HURT

## 2025-05-08 NOTE — WOUND CARE
Wound Care Note:    Wound Care into see patient because of wound to left upper thigh with bleeding    Skin Care & Pressure Relief Recommendations:  Minimize layers of linen  Pads under patient to optimize support surface and microclimate  Turn/reposition approximately every 2 hours.  Pillow Wedges  Manage incontinence  Promote continence; Skin Protective lotion to buttocks and sacrum daily and as needed with incontinence care    Offload heels with pillows or offloading boots.    Support Surface: Foam    Patient seated in recliner, leaning forward and bathing.  No wound noted to left anterior, medial, or lateral thigh.  Patient reports that he has no knowledge of a wound or bleeding.  Absorbent pad on bed did not have any blood on it.  Patient did not feel that he could stand with the walker at the moment due to fatigue.  He denied pain or discomfort to his thighs.  Patient experiencing diarrhea.  Continue to apply zinc paste to perianal and gluteal cleft BID and prn for soiling. If soiled, remove top soiled layer only and reapply.  Use Remedy Phytoplex Barrier Cream wipes for gentle cleansing.  To completely remove, use Remedy Foaming Cleanser or soap and water.     Prevention:     Apply Optifoam Border Sacral foam dressing to sacrum and Border Heel foams to both heels every three days to redistribute pressure from bony prominence and prevent pressure and friction injury to skin.  Rn is to gently peel back foam dressing for shift integumentary assessment and re-secure.  Apply an external  incontinence management system to manage  incontinence, if needed.  Use foam wedge to turn q2h at 30 degree angle to offload sacrum.  Float heels with 1-2 pillows lengthwise while in bed for offloading of the heels.  Maintain HOB at 30 degrees or less, if not contraindicated, to reduce pressure to buttocks and sacrum.  Raise foot of bed to help prevent friction and shear injury from sliding down in the bed. Re-consult [facility] for  other skin/wound care concerns.

## 2025-05-08 NOTE — PROGRESS NOTES
Hospitalist Progress Note    NAME:   Santos Laughlin   : 1955   MRN: [id number]     Date/Time: 2025 9:09 AM  Patient PCP: Nitish Dela Cruz DO    Estimated discharge date:24hrs  Barriers: IV abx, placement, IV diuresis    Hospital course:  Mr. Laughlin is a 70 year old male with hx of chronic pancreatitis, cirrhosis, recently discharged from here with 2 weeks of IV merrem on 2025 for sepsis suspected due to complex hepatic cyst who presented to the emergency department for generalized weakness.  Patient resides at [facility] and was found to have hemoglobin 6.3 subsequently transferred here for further workup and evaluation.    On initial presentation patient hypertensive, tachycardic.  Repeat CBC with hemoglobin 6.3.  1 unit PRBC ordered.  Stool occult negative.  INR 1.6.  CMP with potassium 3.3, renal function with normal limits.  BNP 4285.  Troponin negative. Of note EKG with Afib with RVR. We were asked to admit for further workup and evaluation of acute on chronic anemia. Hgb improved 7.4. Cardiology consulted given new onset Afib. Repeat TTE ordered with preserved EF 55 to 60%.  Metoprolol adjusted to 50 mg twice daily.      Patient hypoglycemic morning , asymptomatic.  Lantus held. Blood sugars stable. Patient tolerating diet.  Iron studies suggestive of anemia of chronic disease with iron deficiency, oral iron supplementation initiated and hemoglobin has remained stable.    Patient with pitting edema bilaterally.  Bilateral lower extremity duplex negative.  Increase spironolactone once patient started on Lasix. Patient still with persistent lower extremity pitting edema. Start IV lasix with albumin to maximize diuresis.     Hgb downtrended to 6.9 5/5. 1U PRBC ordered. Oral iron increased to BID. PT/OT ordered for evaluation as patient has been unable to ambulate on his own. PT/OT recommending SNF. Hemoglobin improved to 7.8. Patient noted to have increasing leukocytosis,  11.7-12.2.  Temperature 5/5 max of 100 °F.  Patient without infectious complaints.  UA negative for UTI.  Chest x-ray obtained no evidence of pneumonia.  Pro-Erick 0.06. Patient started on empiric IV Rocephin. Patient developed significant diarrhea overnight but has been on lactulose and possibly related to antibiotics. Leukocytosis resolved. Procal uptrending 0.08, given increase obtained CT abdpelvis eval infectious process: Demonstrated overall decrease in cystic lesions in liver and pancreas, pancreatic stent in place, chronic pancreatitis, no acute intra-abdominal pathology.    Assessment / Plan:  Acute on chronic anemia  Hgb initially 6.3-7.4 status post 1 unit PRBC 5/1-7.4-7.3-6.9-7.8 status post second 1 unit PRBC-7.6-7.7  Hemoglobin ranging 7.8-6.8 during last admission 1 month ago  Stool occult negative  Iron studies with iron 21, TIBC 74  B12 and folate within normal limits  Suspect iron deficiency anemia versus anemia of chronic disease  Continue oral iron supplementation, increase to twice daily  Continue to monitor H&H  Transfuse for hemoglobin less than 7    Atrial fibrillation, ?new onset  EKG 5/1 A-fib, rate 1 5, no acute ischemic changes  No known history, previous EKGs 3/2025 normal sinus rhythm  Continue metoprolol  TSH/T4 within normal limits  [id number] of 3  Hold initiating anticoagulation at this time given acute anemia  Last echo 3/2025 with a EF 55 to 60%  Cardiology consulted, adjusted metoprolol 50 mg twice daily for better rate control    Leukocytosis  Leukocytosis normalized  Pro-Erick 0.06-0.08-0.5  Patient with no significant infectious complaints  Tmax temperature of 100 °F 5/5  UA without evidence of UTI  Chest x-ray negative for pneumonia  CT abdomen pelvis with decreased cystic lesions in liver and pancreas, chronic pancreatitis no acute intra-abdominal process  Discontinue IV Rocephin given no identified source, WBC normalized    Diarrhea  Multiple bowel movements overnight and

## 2025-05-08 NOTE — PLAN OF CARE
Problem: Chronic Conditions and Co-morbidities  Goal: Patient's chronic conditions and co-morbidity symptoms are monitored and maintained or improved  5/8/2025 0957 by Tonja Quiñonez RN  Outcome: Progressing  Flowsheets (Taken 5/8/2025 0857)  Care Plan - Patient's Chronic Conditions and Co-Morbidity Symptoms are Monitored and Maintained or Improved: Monitor and assess patient's chronic conditions and comorbid symptoms for stability, deterioration, or improvement  5/8/2025 0517 by Alondra Yoon RN  Outcome: Progressing  Flowsheets (Taken 5/7/2025 2104)  Care Plan - Patient's Chronic Conditions and Co-Morbidity Symptoms are Monitored and Maintained or Improved:   Monitor and assess patient's chronic conditions and comorbid symptoms for stability, deterioration, or improvement   Collaborate with multidisciplinary team to address chronic and comorbid conditions and prevent exacerbation or deterioration   Update acute care plan with appropriate goals if chronic or comorbid symptoms are exacerbated and prevent overall improvement and discharge     Problem: Discharge Planning  Goal: Discharge to home or other facility with appropriate resources  5/8/2025 0957 by Tonja Quiñonez RN  Outcome: Progressing  Flowsheets (Taken 5/8/2025 0857)  Discharge to home or other facility with appropriate resources: Identify barriers to discharge with patient and caregiver  5/8/2025 0517 by Alondra Yoon RN  Outcome: Progressing  Flowsheets (Taken 5/7/2025 2104)  Discharge to home or other facility with appropriate resources:   Identify barriers to discharge with patient and caregiver   Arrange for needed discharge resources and transportation as appropriate   Identify discharge learning needs (meds, wound care, etc)     Problem: Skin/Tissue Integrity  Goal: Skin integrity remains intact  Description: 1.  Monitor for areas of redness and/or skin breakdown2.  Assess vascular access sites hourly3.  Every 4-6 hours minimum:

## 2025-05-08 NOTE — CARE COORDINATION
Accepted at SNF ([facility]), @ [address] [place] [address].  Bed available on Saturday (May 10th, 2025).    RN to call report @ [phone].  Bed/room assignment has not been provided yet.      Discharge disposition is home w/daughter; relocating to [place], [place]. Daughter will transport back to [place], [place].     Manual wheelchair delivered yesterday, and is in the bathroom.  Patient to discharge with wheelchair.      TEDDY Hines

## 2025-05-09 LAB
ALBUMIN SERPL-MCNC: 2 G/DL (ref 3.5–5)
ALBUMIN/GLOB SERPL: 0.4 (ref 1.1–2.2)
ALP SERPL-CCNC: 123 U/L (ref 45–117)
ALT SERPL-CCNC: 9 U/L (ref 12–78)
ANION GAP SERPL CALC-SCNC: 3 MMOL/L (ref 2–12)
AST SERPL W P-5'-P-CCNC: 14 U/L (ref 15–37)
BASOPHILS # BLD: 0.02 K/UL (ref 0–0.1)
BASOPHILS NFR BLD: 0.2 % (ref 0–1)
BILIRUB SERPL-MCNC: 0.5 MG/DL (ref 0.2–1)
BUN SERPL-MCNC: 7 MG/DL (ref 6–20)
BUN/CREAT SERPL: 12 (ref 12–20)
CA-I BLD-MCNC: 8.3 MG/DL (ref 8.5–10.1)
CHLORIDE SERPL-SCNC: 101 MMOL/L (ref 97–108)
CO2 SERPL-SCNC: 28 MMOL/L (ref 21–32)
CREAT SERPL-MCNC: 0.59 MG/DL (ref 0.7–1.3)
DIFFERENTIAL METHOD BLD: ABNORMAL
EOSINOPHIL # BLD: 0.31 K/UL (ref 0–0.4)
EOSINOPHIL NFR BLD: 3.3 % (ref 0–7)
ERYTHROCYTE [DISTWIDTH] IN BLOOD BY AUTOMATED COUNT: 16.5 % (ref 11.5–14.5)
GLOBULIN SER CALC-MCNC: 5.4 G/DL (ref 2–4)
GLUCOSE BLD STRIP.AUTO-MCNC: 114 MG/DL (ref 65–100)
GLUCOSE BLD STRIP.AUTO-MCNC: 167 MG/DL (ref 65–100)
GLUCOSE BLD STRIP.AUTO-MCNC: 209 MG/DL (ref 65–100)
GLUCOSE BLD STRIP.AUTO-MCNC: 75 MG/DL (ref 65–100)
GLUCOSE SERPL-MCNC: 90 MG/DL (ref 65–100)
HCT VFR BLD AUTO: 26.3 % (ref 36.6–50.3)
HGB BLD-MCNC: 7.9 G/DL (ref 12.1–17)
IMM GRANULOCYTES # BLD AUTO: 0.03 K/UL (ref 0–0.04)
IMM GRANULOCYTES NFR BLD AUTO: 0.3 % (ref 0–0.5)
LYMPHOCYTES # BLD: 1.19 K/UL (ref 0.8–3.5)
LYMPHOCYTES NFR BLD: 12.6 % (ref 12–49)
MAGNESIUM SERPL-MCNC: 1.5 MG/DL (ref 1.6–2.4)
MCH RBC QN AUTO: 27.1 PG (ref 26–34)
MCHC RBC AUTO-ENTMCNC: 30 G/DL (ref 30–36.5)
MCV RBC AUTO: 90.4 FL (ref 80–99)
MONOCYTES # BLD: 0.88 K/UL (ref 0–1)
MONOCYTES NFR BLD: 9.3 % (ref 5–13)
NEUTS SEG # BLD: 7.01 K/UL (ref 1.8–8)
NEUTS SEG NFR BLD: 74.3 % (ref 32–75)
NRBC # BLD: 0 K/UL (ref 0–0.01)
NRBC BLD-RTO: 0 PER 100 WBC
PERFORMED BY:: ABNORMAL
PERFORMED BY:: NORMAL
PLATELET # BLD AUTO: 262 K/UL (ref 150–400)
PMV BLD AUTO: 10 FL (ref 8.9–12.9)
POTASSIUM SERPL-SCNC: 4.3 MMOL/L (ref 3.5–5.1)
PROT SERPL-MCNC: 7.4 G/DL (ref 6.4–8.2)
RBC # BLD AUTO: 2.91 M/UL (ref 4.1–5.7)
SODIUM SERPL-SCNC: 132 MMOL/L (ref 136–145)
WBC # BLD AUTO: 9.4 K/UL (ref 4.1–11.1)

## 2025-05-09 PROCEDURE — 36415 COLL VENOUS BLD VENIPUNCTURE: CPT

## 2025-05-09 PROCEDURE — 6370000000 HC RX 637 (ALT 250 FOR IP): Performed by: STUDENT IN AN ORGANIZED HEALTH CARE EDUCATION/TRAINING PROGRAM

## 2025-05-09 PROCEDURE — 80053 COMPREHEN METABOLIC PANEL: CPT

## 2025-05-09 PROCEDURE — 6370000000 HC RX 637 (ALT 250 FOR IP): Performed by: HOSPITALIST

## 2025-05-09 PROCEDURE — 83735 ASSAY OF MAGNESIUM: CPT

## 2025-05-09 PROCEDURE — 6370000000 HC RX 637 (ALT 250 FOR IP): Performed by: INTERNAL MEDICINE

## 2025-05-09 PROCEDURE — 97530 THERAPEUTIC ACTIVITIES: CPT

## 2025-05-09 PROCEDURE — 94640 AIRWAY INHALATION TREATMENT: CPT

## 2025-05-09 PROCEDURE — 94761 N-INVAS EAR/PLS OXIMETRY MLT: CPT

## 2025-05-09 PROCEDURE — 2500000003 HC RX 250 WO HCPCS: Performed by: HOSPITALIST

## 2025-05-09 PROCEDURE — 1100000000 HC RM PRIVATE

## 2025-05-09 PROCEDURE — 6360000002 HC RX W HCPCS: Performed by: STUDENT IN AN ORGANIZED HEALTH CARE EDUCATION/TRAINING PROGRAM

## 2025-05-09 PROCEDURE — 6370000000 HC RX 637 (ALT 250 FOR IP): Performed by: PHYSICIAN ASSISTANT

## 2025-05-09 PROCEDURE — 82962 GLUCOSE BLOOD TEST: CPT

## 2025-05-09 PROCEDURE — 85025 COMPLETE CBC W/AUTO DIFF WBC: CPT

## 2025-05-09 RX ORDER — MAGNESIUM SULFATE IN WATER 40 MG/ML
2000 INJECTION, SOLUTION INTRAVENOUS ONCE
Status: COMPLETED | OUTPATIENT
Start: 2025-05-09 | End: 2025-05-09

## 2025-05-09 RX ORDER — LANOLIN ALCOHOL/MO/W.PET/CERES
400 CREAM (GRAM) TOPICAL 2 TIMES DAILY
Qty: 30 TABLET | Refills: 0 | Status: SHIPPED | OUTPATIENT
Start: 2025-05-09

## 2025-05-09 RX ORDER — METOPROLOL SUCCINATE 50 MG/1
50 TABLET, EXTENDED RELEASE ORAL 2 TIMES DAILY
Qty: 30 TABLET | Refills: 3 | Status: SHIPPED | OUTPATIENT
Start: 2025-05-09

## 2025-05-09 RX ORDER — POTASSIUM CHLORIDE 1500 MG/1
20 TABLET, EXTENDED RELEASE ORAL DAILY
Qty: 60 TABLET | Refills: 0 | Status: SHIPPED | OUTPATIENT
Start: 2025-05-09

## 2025-05-09 RX ORDER — INSULIN ASPART 100 [IU]/ML
INJECTION, SOLUTION INTRAVENOUS; SUBCUTANEOUS
Qty: 15 ADJUSTABLE DOSE PRE-FILLED PEN SYRINGE | Refills: 0 | Status: SHIPPED | OUTPATIENT
Start: 2025-05-09 | End: 2025-05-09 | Stop reason: HOSPADM

## 2025-05-09 RX ORDER — HYDRALAZINE HYDROCHLORIDE 20 MG/ML
10 INJECTION INTRAMUSCULAR; INTRAVENOUS EVERY 6 HOURS PRN
Status: DISCONTINUED | OUTPATIENT
Start: 2025-05-09 | End: 2025-05-10 | Stop reason: HOSPADM

## 2025-05-09 RX ORDER — FERROUS SULFATE 325(65) MG
325 TABLET ORAL 2 TIMES DAILY WITH MEALS
Qty: 30 TABLET | Refills: 3 | Status: SHIPPED | OUTPATIENT
Start: 2025-05-10

## 2025-05-09 RX ORDER — FUROSEMIDE 40 MG/1
40 TABLET ORAL DAILY
Qty: 60 TABLET | Refills: 3 | Status: SHIPPED | OUTPATIENT
Start: 2025-05-10

## 2025-05-09 RX ORDER — GLIPIZIDE 5 MG/1
5 TABLET ORAL 2 TIMES DAILY
Qty: 60 TABLET | Refills: 1 | Status: SHIPPED | OUTPATIENT
Start: 2025-05-09

## 2025-05-09 RX ORDER — SPIRONOLACTONE 50 MG/1
50 TABLET, FILM COATED ORAL DAILY
Qty: 30 TABLET | Refills: 3 | Status: SHIPPED | OUTPATIENT
Start: 2025-05-10

## 2025-05-09 RX ADMIN — OXYCODONE HYDROCHLORIDE 10 MG: 10 TABLET ORAL at 16:39

## 2025-05-09 RX ADMIN — Medication 2 PUFF: at 20:48

## 2025-05-09 RX ADMIN — FERROUS SULFATE TAB 325 MG (65 MG ELEMENTAL FE) 325 MG: 325 (65 FE) TAB at 16:12

## 2025-05-09 RX ADMIN — OXYCODONE HYDROCHLORIDE 10 MG: 10 TABLET ORAL at 09:10

## 2025-05-09 RX ADMIN — SODIUM CHLORIDE, PRESERVATIVE FREE 10 ML: 5 INJECTION INTRAVENOUS at 20:47

## 2025-05-09 RX ADMIN — SODIUM CHLORIDE, PRESERVATIVE FREE 10 ML: 5 INJECTION INTRAVENOUS at 09:11

## 2025-05-09 RX ADMIN — POTASSIUM CHLORIDE 40 MEQ: 1500 TABLET, EXTENDED RELEASE ORAL at 20:45

## 2025-05-09 RX ADMIN — PANTOPRAZOLE SODIUM 40 MG: 40 TABLET, DELAYED RELEASE ORAL at 08:59

## 2025-05-09 RX ADMIN — PREGABALIN 75 MG: 75 CAPSULE ORAL at 20:45

## 2025-05-09 RX ADMIN — FUROSEMIDE 40 MG: 40 TABLET ORAL at 08:59

## 2025-05-09 RX ADMIN — Medication 3 MG: at 20:45

## 2025-05-09 RX ADMIN — METOPROLOL SUCCINATE 50 MG: 50 TABLET, EXTENDED RELEASE ORAL at 20:45

## 2025-05-09 RX ADMIN — HYDROXYZINE PAMOATE 25 MG: 25 CAPSULE ORAL at 09:10

## 2025-05-09 RX ADMIN — DIPHENHYDRAMINE HYDROCHLORIDE 25 MG: 25 CAPSULE ORAL at 22:13

## 2025-05-09 RX ADMIN — CYCLOBENZAPRINE HYDROCHLORIDE 10 MG: 10 TABLET, FILM COATED ORAL at 09:10

## 2025-05-09 RX ADMIN — INSULIN LISPRO 2 UNITS: 100 INJECTION, SOLUTION INTRAVENOUS; SUBCUTANEOUS at 13:06

## 2025-05-09 RX ADMIN — OXYCODONE HYDROCHLORIDE 10 MG: 10 TABLET ORAL at 22:47

## 2025-05-09 RX ADMIN — PANCRELIPASE LIPASE, PANCRELIPASE PROTEASE, PANCRELIPASE AMYLASE 5000 UNITS: 5000; 17000; 24000 CAPSULE, DELAYED RELEASE ORAL at 10:32

## 2025-05-09 RX ADMIN — TAMSULOSIN HYDROCHLORIDE 0.4 MG: 0.4 CAPSULE ORAL at 08:59

## 2025-05-09 RX ADMIN — Medication 1 CAPSULE: at 08:59

## 2025-05-09 RX ADMIN — DIPHENHYDRAMINE HYDROCHLORIDE 25 MG: 25 CAPSULE ORAL at 16:12

## 2025-05-09 RX ADMIN — PANCRELIPASE LIPASE, PANCRELIPASE PROTEASE, PANCRELIPASE AMYLASE 5000 UNITS: 5000; 17000; 24000 CAPSULE, DELAYED RELEASE ORAL at 16:12

## 2025-05-09 RX ADMIN — MAGNESIUM SULFATE HEPTAHYDRATE 2000 MG: 40 INJECTION, SOLUTION INTRAVENOUS at 19:50

## 2025-05-09 RX ADMIN — PREGABALIN 75 MG: 75 CAPSULE ORAL at 08:58

## 2025-05-09 RX ADMIN — FERROUS SULFATE TAB 325 MG (65 MG ELEMENTAL FE) 325 MG: 325 (65 FE) TAB at 08:57

## 2025-05-09 RX ADMIN — PANCRELIPASE LIPASE, PANCRELIPASE PROTEASE, PANCRELIPASE AMYLASE 5000 UNITS: 5000; 17000; 24000 CAPSULE, DELAYED RELEASE ORAL at 13:06

## 2025-05-09 RX ADMIN — POTASSIUM CHLORIDE 40 MEQ: 1500 TABLET, EXTENDED RELEASE ORAL at 08:57

## 2025-05-09 RX ADMIN — SPIRONOLACTONE 50 MG: 25 TABLET ORAL at 08:59

## 2025-05-09 RX ADMIN — CYCLOBENZAPRINE HYDROCHLORIDE 10 MG: 10 TABLET, FILM COATED ORAL at 22:13

## 2025-05-09 RX ADMIN — METOPROLOL SUCCINATE 50 MG: 50 TABLET, EXTENDED RELEASE ORAL at 08:57

## 2025-05-09 ASSESSMENT — PAIN - FUNCTIONAL ASSESSMENT: PAIN_FUNCTIONAL_ASSESSMENT: ACTIVITIES ARE NOT PREVENTED

## 2025-05-09 ASSESSMENT — PAIN SCALES - GENERAL
PAINLEVEL_OUTOF10: 3
PAINLEVEL_OUTOF10: 9
PAINLEVEL_OUTOF10: 4
PAINLEVEL_OUTOF10: 6
PAINLEVEL_OUTOF10: 4
PAINLEVEL_OUTOF10: 6

## 2025-05-09 ASSESSMENT — PAIN SCALES - WONG BAKER
WONGBAKER_NUMERICALRESPONSE: NO HURT
WONGBAKER_NUMERICALRESPONSE: HURTS LITTLE MORE
WONGBAKER_NUMERICALRESPONSE: NO HURT
WONGBAKER_NUMERICALRESPONSE: NO HURT

## 2025-05-09 ASSESSMENT — PAIN DESCRIPTION - DESCRIPTORS: DESCRIPTORS: ACHING

## 2025-05-09 ASSESSMENT — PAIN DESCRIPTION - LOCATION
LOCATION: CHEST;BACK
LOCATION: CHEST;GENERALIZED

## 2025-05-09 ASSESSMENT — PAIN DESCRIPTION - ORIENTATION: ORIENTATION: RIGHT;LEFT

## 2025-05-09 NOTE — PROGRESS NOTES
OT tx attempted at 1429 however pt on BSC and requesting to try back later. Will continue to follow and re-attempt OT at a later time. Thank you.

## 2025-05-09 NOTE — DISCHARGE SUMMARY
Discharge Summary    Name: Santos Laughlin  [id number]  YOB: 1955 (Age: 70 y.o.)   Date of Admission: 5/1/2025  Date of Discharge: 5/9/2025  Attending Physician: Marcin Hernández MD    Discharge Diagnosis:   Principal Problem:    Anemia, iron deficiency  Active Problems:    Anemia  Chronic pancreatitis status post stent  Hepatic cysts recent infection status post IV meropenem  Iron deficiency anemia  New onset atrial fibrillation  Chronic lower extremity edema  Hypertension  DM2  Resolved Problems:    * No resolved hospital problems. *       Consultations:  IP CONSULT TO CARDIOLOGY      Brief Admission History/Reason for Admission Per Caitlyn Isidro MD:   Brief Hospital Course by Main Problems:   Mr. Laughlin is a 70 year old male with hx of chronic pancreatitis, cirrhosis, recently discharged from here with 2 weeks of IV merrem on 4/4/2025 for sepsis suspected due to complex hepatic cyst who presented to the emergency department for generalized weakness.  Patient resides at [facility] and was found to have hemoglobin 6.3 subsequently transferred here for further workup and evaluation.     On initial presentation patient hypertensive, tachycardic.  Repeat CBC with hemoglobin 6.3.  1 unit PRBC ordered.  Stool occult negative.  INR 1.6.  CMP with potassium 3.3, renal function with normal limits.  BNP 4285.  Troponin negative. Of note EKG with Afib with RVR. We were asked to admit for further workup and evaluation of acute on chronic anemia.     Cardiology consulted given new onset Afib. Repeat TTE ordered with preserved EF 55 to 60%.  Metoprolol adjusted to 50 mg twice daily.  Outpatient follow-up with cardiology for continued outpatient management.     Patient hypoglycemic morning 5/2, asymptomatic.  Lantus held.  A1c 6.0.  Blood sugars stable. Patient tolerating diet.  Will discontinue Lantus upon discharge given labile blood sugars and A1c of 6.  Will start glipizide on discharge and outpatient follow-up with PCP.     Patient with pitting edema bilaterally.  Bilateral lower extremity duplex negative. While inpatient patient received IV Lasix with albumin for maximum diuresis.  Plan to discharge on oral Lasix and spironolactone. Patient with hypomagnesemia and hypokalemia.  Patient to continue daily oral supplementation on discharge.     Iron studies suggestive of anemia of chronic disease with iron deficiency, oral iron supplementation initiated and hemoglobin has remained stable.Hgb downtrended to 6.9 5/5. 1U PRBC ordered. Oral iron increased to BID.  Hemoglobin has been stable since.      Patient noted to have increasing leukocytosis, 11.7-12.2.  Temperature 5/5 max of 100 °F.  Patient without infectious complaints.  UA negative for UTI.  Chest x-ray obtained no evidence of pneumonia.  Pro-Erick 0.06. Patient started on empiric IV Rocephin. Patient developed significant diarrhea but has been on lactulose and possibly related to antibiotics. Leukocytosis resolved.  Diarrhea improved with probiotic and holding of lactulose.  Procal uptrending 0.08, given increase obtained CT abdpelvis eval infectious process: Demonstrated overall decrease in cystic lesions in liver and pancreas, pancreatic stent in place, chronic pancreatitis, no acute intra-abdominal pathology.  Antibiotics subsequently discontinued.  Patient has remained afebrile with no source of infection and leukocytosis normalized.    PT/OT ordered for evaluation as patient has been unable to ambulate on his own. PT/OT recommending SNF.  Case management assisting with placement.  Patient has been accepted to SNF in [place].  Currently patient feeling improved, hemodynamically stable.  Patient medically stable for discharge pending bed availability at SNF.    Discharge Exam:  Patient seen and examined by me on discharge day.  Pertinent Findings:  Patient Vitals for the past 24 hrs:   BP Temp Temp

## 2025-05-09 NOTE — PLAN OF CARE
Problem: Chronic Conditions and Co-morbidities  Goal: Patient's chronic conditions and co-morbidity symptoms are monitored and maintained or improved  5/8/2025 2355 by Alondra Yoon RN  Outcome: Progressing  5/8/2025 0957 by Tonja Quiñonez RN  Outcome: Progressing  Flowsheets (Taken 5/8/2025 0857)  Care Plan - Patient's Chronic Conditions and Co-Morbidity Symptoms are Monitored and Maintained or Improved: Monitor and assess patient's chronic conditions and comorbid symptoms for stability, deterioration, or improvement     Problem: Discharge Planning  Goal: Discharge to home or other facility with appropriate resources  5/8/2025 2355 by Alondra Yoon RN  Outcome: Progressing  5/8/2025 0957 by Tonja Quiñonez RN  Outcome: Progressing  Flowsheets (Taken 5/8/2025 0857)  Discharge to home or other facility with appropriate resources: Identify barriers to discharge with patient and caregiver     Problem: Skin/Tissue Integrity  Goal: Skin integrity remains intact  Description: 1.  Monitor for areas of redness and/or skin breakdown2.  Assess vascular access sites hourly3.  Every 4-6 hours minimum:  Change oxygen saturation probe site4.  Every 4-6 hours:  If on nasal continuous positive airway pressure, respiratory therapy assess nares and determine need for appliance change or resting period  5/8/2025 2355 by Alondra Yoon RN  Outcome: Progressing  5/8/2025 0957 by Tonja Quiñonez RN  Outcome: Progressing  Flowsheets (Taken 5/8/2025 0857)  Skin Integrity Remains Intact: Monitor for areas of redness and/or skin breakdown     Problem: Safety - Adult  Goal: Free from fall injury  5/8/2025 2355 by Alondra Yoon RN  Outcome: Progressing  5/8/2025 0957 by Tonja Quiñonez RN  Outcome: Progressing     Problem: Pain  Goal: Verbalizes/displays adequate comfort level or baseline comfort level  5/8/2025 2355 by Alondra Yoon RN  Outcome: Progressing  5/8/2025 0957 by Tonja Quiñonez RN  Outcome:  Progressing  5/8/2025 0957 by Tonja Quiñonez RN  Outcome: Progressing  Flowsheets (Taken 5/8/2025 0857)  Minimal or absence of nausea and vomiting: Administer IV fluids as ordered to ensure adequate hydration  Goal: Maintains or returns to baseline bowel function  5/8/2025 2355 by Alondra Yoon RN  Outcome: Progressing  5/8/2025 0957 by Tonja Quiñonez RN  Outcome: Progressing  Flowsheets (Taken 5/8/2025 0857)  Maintains or returns to baseline bowel function: Assess bowel function  Goal: Maintains adequate nutritional intake  5/8/2025 2355 by Alondra Yoon RN  Outcome: Progressing  5/8/2025 0957 by Tonja Quiñonez RN  Outcome: Progressing  Flowsheets (Taken 5/8/2025 0857)  Maintains adequate nutritional intake: Monitor percentage of each meal consumed  Goal: Establish and maintain optimal ostomy function  5/8/2025 2355 by Alondra Yoon RN  Outcome: Progressing  5/8/2025 0957 by Tonja Quiñonez RN  Outcome: Progressing  Flowsheets (Taken 5/8/2025 0857)  Establish and maintain optimal ostomy function: Administer IV fluids and TPN as ordered     Problem: Genitourinary - Adult  Goal: Absence of urinary retention  5/8/2025 2355 by Alondra Yoon RN  Outcome: Progressing  5/8/2025 0957 by Tonja Quiñonez RN  Outcome: Progressing  Flowsheets (Taken 5/8/2025 0857)  Absence of urinary retention: Assess patient’s ability to void and empty bladder  Goal: Urinary catheter remains patent  5/8/2025 2355 by Alondra Yoon RN  Outcome: Progressing  5/8/2025 0957 by Tonja Quiñonez RN  Outcome: Progressing  Flowsheets (Taken 5/8/2025 0857)  Urinary catheter remains patent: Assess patency of urinary catheter     Problem: Infection - Adult  Goal: Absence of infection at discharge  5/8/2025 2355 by Alondra Yoon RN  Outcome: Progressing  5/8/2025 0957 by Tonja Quiñonez RN  Outcome: Progressing  Flowsheets (Taken 5/8/2025 0857)  Absence of infection at discharge: Assess and monitor for signs and

## 2025-05-09 NOTE — CARE COORDINATION
Patient has been accepted at [facility] located at [address] [place] [address], [id number].  They will have a bed available for patient tomorrow.  CM has asked for a bed assignment,  Liaison at SNF stated they will have a bed assignment this afternoon. Nurse will be able to call report to [phone].  CM called daughter Jyoti Laughlin [phone] to discuss, no answer to telephone, CM left voicemail requesting return telephone call. Per previous documentation, patients daughter will be transporting to SNF.  Patient has his wheelchair in his room as well.

## 2025-05-09 NOTE — PLAN OF CARE
OCCUPATIONAL THERAPY TREATMENT  Patient: Santos Laughlin (70 y.o. male)  Date: 5/9/2025  Primary Diagnosis: Anemia, iron deficiency [D50.9]  New onset atrial fibrillation (HCC) [I48.91]  Anemia, unspecified type [D64.9]       Precautions: Fall Risk                Recommendations for nursing mobility: Out of bed to chair for meals, Encourage HEP in prep for ADLs/mobility; see handout for details, Use of BSC for toileting , AD and gt belt for bed to chair , Amb to bathroom with AD and gait belt, and Assist x1    In place during session: EKG/telemetry   Chart, occupational therapy assessment, plan of care, and goals were reviewed.  ASSESSMENT  Patient continues with skilled OT services and is progressing towards goals. Pt seated on toilet upon ELLIOTT arrival, agreeable to session. Pt A&O x 4. Pt required min A for sts from bsc and t/f to bed.  Pt also required min A bringing leg up to bed surface.  Pt mod I w/ rolling and scooting to hob. Education reviewed on energy conservation, safety awareness and HEP w/ pt verbalizing good understanding.  (See below for objective details and assist levels).     Overall pt tolerated session fair today with rest breaks. Pt required encouragement to participate in tx session Current OT recommendations for discharge Moderate intensity short-term skilled occupational therapy up to 5x/week. Will continue to benefit from skilled OT services, and will continue to progress as tolerated.       GOALS:    Problem: Occupational Therapy - Adult  Goal: By Discharge: Performs self-care activities at highest level of function for planned discharge setting.  See evaluation for individualized goals.  Description: FUNCTIONAL STATUS PRIOR TO ADMISSION: Patient was modified independent using a rolling walker for functional mobility. The patient was independent for basic and instrumental ADLs.    HOME SUPPORT PRIOR TO ADMISSION: The patient was at [facility] for about 2 weeks following previous

## 2025-05-09 NOTE — PROGRESS NOTES
Comprehensive Nutrition Assessment    Type and Reason for Visit:  Initial, LOS    Nutrition Recommendations/Plan:   Continue regular diet as tolerated  If PO intake declines recommend LC/HP ONS daily      Malnutrition Assessment:  Malnutrition Status:  At risk for malnutrition (05/09/25 1815)    Context:  Chronic Illness     Findings of the 6 clinical characteristics of malnutrition:  Energy Intake:  No decrease in energy intake  Weight Loss:  No weight loss (Comp weight hx endorses weight gain within ~2 months; not sign for malnutrition)     Body Fat Loss:  Unable to assess     Muscle Mass Loss:  Unable to assess    Fluid Accumulation:  Unable to assess     Strength:  Not Performed    Nutrition Assessment:    Pt admitted for iron deficiency anemia; tolerating regular diet; RD screened for LOS; Intake >75% at meals  No acute dx at this time    Nutrition Related Findings:    AAOx4; GCS 15; active bowel sounds; soft rounded abdomen; LBM 5/9; +3 pitting edema; wound to thigh; Labs/Meds reviewed Wound Type: Surgical Incision       Current Nutrition Intake & Therapies:    Average Meal Intake: %  Average Supplements Intake: None Ordered  ADULT DIET; Regular    Anthropometric Measures:  Height: 175.3 cm (5' 9.02\")  Ideal Body Weight (IBW): 160 lbs (73 kg)    Admission Body Weight: 113.4 kg (250 lb)  Current Body Weight: 113.4 kg (250 lb), 156.3 % IBW. Weight Source: Bed scale  Current BMI (kg/m2): 36.9  Usual Body Weight: 106.1 kg (234 lb)  % Weight Change (Calculated): 6.8  Weight Adjustment For: No Adjustment  BMI Categories: Obese Class 2 (BMI 35.0 -39.9)    Estimated Daily Nutrient Needs:  Energy Requirements Based On: Formula  Weight Used for Energy Requirements: Current  Energy (kcal/day): MSJ x 1.2 x 1.2 = 8361-7692  Weight Used for Protein Requirements: Current  Protein (g/day): 0.8-1.0 g pro/kg CBW =   Method Used for Fluid Requirements: 1 ml/kcal  Fluid (ml/day): or per MD recs    Nutrition  Diagnosis:   No nutrition diagnosis at this time     Nutrition Interventions:   Food and/or Nutrient Delivery: Continue Current Diet  Nutrition Education/Counseling: Education/Counseling not indicated  Coordination of Nutrition Care: Continue to monitor while inpatient       Goals:  Goals: Meet at least 75% of estimated needs, within 7 days, by next RD assessment  Type of Goal: New goal  Previous Goal Met: New Goal    Nutrition Monitoring and Evaluation:   Behavioral-Environmental Outcomes: None Identified  Food/Nutrient Intake Outcomes: Food and Nutrient Intake  Physical Signs/Symptoms Outcomes: Biochemical Data, Nutrition Focused Physical Findings, GI Status, Skin, Weight, Fluid Status or Edema    Discharge Planning:    No discharge needs at this time     Vannessa Gray RD  Contact: [phone]

## 2025-05-10 VITALS
HEIGHT: 69 IN | SYSTOLIC BLOOD PRESSURE: 142 MMHG | DIASTOLIC BLOOD PRESSURE: 90 MMHG | WEIGHT: 250 LBS | RESPIRATION RATE: 18 BRPM | BODY MASS INDEX: 37.03 KG/M2 | OXYGEN SATURATION: 95 % | HEART RATE: 85 BPM | TEMPERATURE: 98.6 F

## 2025-05-10 LAB
ALBUMIN SERPL-MCNC: 1.9 G/DL (ref 3.5–5)
ALBUMIN/GLOB SERPL: 0.4 (ref 1.1–2.2)
ALP SERPL-CCNC: 120 U/L (ref 45–117)
ALT SERPL-CCNC: 10 U/L (ref 12–78)
ANION GAP SERPL CALC-SCNC: 6 MMOL/L (ref 2–12)
AST SERPL W P-5'-P-CCNC: 12 U/L (ref 15–37)
BASOPHILS # BLD: 0.02 K/UL (ref 0–0.1)
BASOPHILS NFR BLD: 0.2 % (ref 0–1)
BILIRUB SERPL-MCNC: 0.4 MG/DL (ref 0.2–1)
BUN SERPL-MCNC: 8 MG/DL (ref 6–20)
BUN/CREAT SERPL: 12 (ref 12–20)
CA-I BLD-MCNC: 7.8 MG/DL (ref 8.5–10.1)
CHLORIDE SERPL-SCNC: 99 MMOL/L (ref 97–108)
CO2 SERPL-SCNC: 27 MMOL/L (ref 21–32)
CREAT SERPL-MCNC: 0.67 MG/DL (ref 0.7–1.3)
DIFFERENTIAL METHOD BLD: ABNORMAL
EOSINOPHIL # BLD: 0.29 K/UL (ref 0–0.4)
EOSINOPHIL NFR BLD: 3.1 % (ref 0–7)
ERYTHROCYTE [DISTWIDTH] IN BLOOD BY AUTOMATED COUNT: 16.5 % (ref 11.5–14.5)
GLOBULIN SER CALC-MCNC: 5.2 G/DL (ref 2–4)
GLUCOSE BLD STRIP.AUTO-MCNC: 135 MG/DL (ref 65–100)
GLUCOSE BLD STRIP.AUTO-MCNC: 210 MG/DL (ref 65–100)
GLUCOSE SERPL-MCNC: 193 MG/DL (ref 65–100)
HCT VFR BLD AUTO: 24.9 % (ref 36.6–50.3)
HGB BLD-MCNC: 7.5 G/DL (ref 12.1–17)
IMM GRANULOCYTES # BLD AUTO: 0.02 K/UL (ref 0–0.04)
IMM GRANULOCYTES NFR BLD AUTO: 0.2 % (ref 0–0.5)
LYMPHOCYTES # BLD: 1 K/UL (ref 0.8–3.5)
LYMPHOCYTES NFR BLD: 10.8 % (ref 12–49)
MAGNESIUM SERPL-MCNC: 1.5 MG/DL (ref 1.6–2.4)
MCH RBC QN AUTO: 27.2 PG (ref 26–34)
MCHC RBC AUTO-ENTMCNC: 30.1 G/DL (ref 30–36.5)
MCV RBC AUTO: 90.2 FL (ref 80–99)
MONOCYTES # BLD: 0.88 K/UL (ref 0–1)
MONOCYTES NFR BLD: 9.5 % (ref 5–13)
NEUTS SEG # BLD: 7.08 K/UL (ref 1.8–8)
NEUTS SEG NFR BLD: 76.2 % (ref 32–75)
NRBC # BLD: 0 K/UL (ref 0–0.01)
NRBC BLD-RTO: 0 PER 100 WBC
PERFORMED BY:: ABNORMAL
PERFORMED BY:: ABNORMAL
PLATELET # BLD AUTO: 258 K/UL (ref 150–400)
PMV BLD AUTO: 9.7 FL (ref 8.9–12.9)
POTASSIUM SERPL-SCNC: 4.4 MMOL/L (ref 3.5–5.1)
PROT SERPL-MCNC: 7.1 G/DL (ref 6.4–8.2)
RBC # BLD AUTO: 2.76 M/UL (ref 4.1–5.7)
SODIUM SERPL-SCNC: 132 MMOL/L (ref 136–145)
WBC # BLD AUTO: 9.3 K/UL (ref 4.1–11.1)

## 2025-05-10 PROCEDURE — 6370000000 HC RX 637 (ALT 250 FOR IP): Performed by: STUDENT IN AN ORGANIZED HEALTH CARE EDUCATION/TRAINING PROGRAM

## 2025-05-10 PROCEDURE — 6370000000 HC RX 637 (ALT 250 FOR IP): Performed by: INTERNAL MEDICINE

## 2025-05-10 PROCEDURE — 6370000000 HC RX 637 (ALT 250 FOR IP): Performed by: HOSPITALIST

## 2025-05-10 PROCEDURE — 94761 N-INVAS EAR/PLS OXIMETRY MLT: CPT

## 2025-05-10 PROCEDURE — 80053 COMPREHEN METABOLIC PANEL: CPT

## 2025-05-10 PROCEDURE — 85025 COMPLETE CBC W/AUTO DIFF WBC: CPT

## 2025-05-10 PROCEDURE — 94640 AIRWAY INHALATION TREATMENT: CPT

## 2025-05-10 PROCEDURE — 83735 ASSAY OF MAGNESIUM: CPT

## 2025-05-10 PROCEDURE — 82962 GLUCOSE BLOOD TEST: CPT

## 2025-05-10 PROCEDURE — 36415 COLL VENOUS BLD VENIPUNCTURE: CPT

## 2025-05-10 RX ORDER — OXYCODONE HYDROCHLORIDE 5 MG/1
10 TABLET ORAL EVERY 6 HOURS PRN
Qty: 12 TABLET | Refills: 0 | Status: SHIPPED | OUTPATIENT
Start: 2025-05-10 | End: 2025-05-13

## 2025-05-10 RX ADMIN — Medication 2 PUFF: at 08:24

## 2025-05-10 RX ADMIN — OXYCODONE HYDROCHLORIDE 10 MG: 10 TABLET ORAL at 06:43

## 2025-05-10 RX ADMIN — FERROUS SULFATE TAB 325 MG (65 MG ELEMENTAL FE) 325 MG: 325 (65 FE) TAB at 09:09

## 2025-05-10 RX ADMIN — Medication 400 MG: at 09:09

## 2025-05-10 RX ADMIN — PANCRELIPASE LIPASE, PANCRELIPASE PROTEASE, PANCRELIPASE AMYLASE 5000 UNITS: 5000; 17000; 24000 CAPSULE, DELAYED RELEASE ORAL at 09:08

## 2025-05-10 RX ADMIN — TAMSULOSIN HYDROCHLORIDE 0.4 MG: 0.4 CAPSULE ORAL at 09:09

## 2025-05-10 RX ADMIN — INSULIN LISPRO 2 UNITS: 100 INJECTION, SOLUTION INTRAVENOUS; SUBCUTANEOUS at 11:24

## 2025-05-10 RX ADMIN — METOPROLOL SUCCINATE 50 MG: 50 TABLET, EXTENDED RELEASE ORAL at 09:09

## 2025-05-10 RX ADMIN — POTASSIUM CHLORIDE 40 MEQ: 1500 TABLET, EXTENDED RELEASE ORAL at 09:08

## 2025-05-10 RX ADMIN — SPIRONOLACTONE 50 MG: 25 TABLET ORAL at 09:09

## 2025-05-10 RX ADMIN — PANTOPRAZOLE SODIUM 40 MG: 40 TABLET, DELAYED RELEASE ORAL at 06:43

## 2025-05-10 RX ADMIN — PREGABALIN 75 MG: 75 CAPSULE ORAL at 09:09

## 2025-05-10 RX ADMIN — PANCRELIPASE LIPASE, PANCRELIPASE PROTEASE, PANCRELIPASE AMYLASE 5000 UNITS: 5000; 17000; 24000 CAPSULE, DELAYED RELEASE ORAL at 11:24

## 2025-05-10 RX ADMIN — FUROSEMIDE 40 MG: 40 TABLET ORAL at 09:09

## 2025-05-10 RX ADMIN — Medication 1 CAPSULE: at 09:09

## 2025-05-10 ASSESSMENT — PAIN SCALES - GENERAL
PAINLEVEL_OUTOF10: 3
PAINLEVEL_OUTOF10: 3

## 2025-05-10 NOTE — DISCHARGE SUMMARY
Discharge Summary    Name: Santos Laughlin  [id number]  YOB: 1955 (Age: 70 y.o.)   Date of Admission: 5/1/2025  Date of Discharge: 5/10/2025  Attending Physician: Marcin Hernández MD    Discharge Diagnosis:   Principal Problem:  Anemia, iron deficiency  Active Problems:  Anemia  Chronic pancreatitis status post stent  Hepatic cysts recent infection status post IV meropenem  Iron deficiency anemia  New onset atrial fibrillation  Chronic lower extremity edema  Hypertension  DM2  Cirrhosis  History of alcohol dependency  Resolved Problems:    * No resolved hospital problems. *       Consultations:  IP CONSULT TO CARDIOLOGY      Brief Admission History/Reason for Admission Per Caitlyn Isidro MD:   Brief Hospital Course by Main Problems:   Mr. Laughlin is a 70 year old male with hx of chronic pancreatitis, cirrhosis, recently discharged from here with 2 weeks of IV merrem on 4/4/2025 for sepsis suspected due to complex hepatic cyst who presented to the emergency department for generalized weakness.  Patient resides at [facility] and was found to have hemoglobin 6.3 subsequently transferred here for further workup and evaluation.     On initial presentation patient hypertensive, tachycardic.  Repeat CBC with hemoglobin 6.3.  1 unit PRBC ordered.  Stool occult negative.  INR 1.6.  CMP with potassium 3.3, renal function with normal limits.  BNP 4285.  Troponin negative. Of note EKG with Afib with RVR. We were asked to admit for further workup and evaluation of acute on chronic anemia.     Cardiology consulted given new onset Afib. Repeat TTE ordered with preserved EF 55 to 60%.  Metoprolol adjusted to 50 mg twice daily.  Defer anticoagulation at this time given anemia requiring transfusions. CHADSVASC of 3. Outpatient follow-up with cardiology for continued outpatient management and consideration of Watchman.     Patient hypoglycemic morning 5/2,

## 2025-05-10 NOTE — PROGRESS NOTES
Patient refused to allow me to change his iv since he is slotted to discharge to a SNF in the morning.

## 2025-05-10 NOTE — PLAN OF CARE
Problem: Chronic Conditions and Co-morbidities  Goal: Patient's chronic conditions and co-morbidity symptoms are monitored and maintained or improved  Recent Flowsheet Documentation  Taken 5/9/2025 2034 by Chinyere Adames RN  Care Plan - Patient's Chronic Conditions and Co-Morbidity Symptoms are Monitored and Maintained or Improved: Monitor and assess patient's chronic conditions and comorbid symptoms for stability, deterioration, or improvement     Problem: Skin/Tissue Integrity  Goal: Skin integrity remains intact  Description: 1.  Monitor for areas of redness and/or skin breakdown2.  Assess vascular access sites hourly3.  Every 4-6 hours minimum:  Change oxygen saturation probe site4.  Every 4-6 hours:  If on nasal continuous positive airway pressure, respiratory therapy assess nares and determine need for appliance change or resting period  Outcome: Progressing  Flowsheets (Taken 5/9/2025 2034)  Skin Integrity Remains Intact: Monitor for areas of redness and/or skin breakdown     Problem: Neurosensory - Adult  Goal: Achieves stable or improved neurological status  Recent Flowsheet Documentation  Taken 5/9/2025 2034 by Chinyere Adames RN  Achieves stable or improved neurological status: Assess for and report changes in neurological status  Goal: Absence of seizures  Recent Flowsheet Documentation  Taken 5/9/2025 2034 by Chinyere Adames RN  Absence of seizures: Monitor for seizure activity.  If seizure occurs, document type and location of movements and any associated apnea  Goal: Remains free of injury related to seizures activity  Recent Flowsheet Documentation  Taken 5/9/2025 2034 by Chinyere Adames RN  Remains free of injury related to seizure activity: Maintain airway, patient safety  and administer oxygen as ordered

## 2025-05-10 NOTE — CARE COORDINATION
Patient discharging today going to [facility] in [place] today. Daughters coming to transport to [place] today. Facility aware.    Nurse to call report at [phone]    [facility]   [address] [place] [address]  [facility]    Transition of Care Plan:    RUR: 26%  Prior Level of Functioning: ind  Disposition: SNF  STANISLAW: today  If SNF or IPR: Date FOC offered: 05/06  Date FOC received: 05/06  Accepting facility: [facility]  Date authorization started with reference number: na  Date authorization received and expires: na  Follow up appointments:   DME needed: wheelchair/delivered  Transportation at discharge: daughter  IM/IMM Medicare/ letter given: 05/10  Is patient a  and connected with [facility]? na  If yes, was [employer] transfer form completed and [facility] notified? na  Caregiver Contact: daughter for transport, per patient they are here  Discharge Caregiver contacted prior to discharge? na  Care Conference needed? na  Barriers to discharge: na

## 2025-05-10 NOTE — PROGRESS NOTES
Pt has discharge orders to SNF for today. Spoke with attending provider and she stated the pt is able to discharge today. Pt is stable, alert, oriented and does not show any signs of distress. Pt is discharging without and IV, tele or burk.    Report was called to [facility] and spoke with Maury. SBAR given in report along with recent vitals and labs. Unit number given for possible future questions. Pt daughters are transporting him to the SNF.    Discharge plan of care/case management plan validated with provider discharge order.

## 2025-05-10 NOTE — PROGRESS NOTES
Reviewed discharge instructions with pt and pt family members. All verbalized understanding of instructions. Pt is being transported to the facility by his daughters.

## 2025-05-10 NOTE — DISCHARGE INSTR - COC
scale): Pain Level: 3  Last Weight:   Wt Readings from Last 1 Encounters:   05/01/25 113.4 kg (250 lb)     Mental Status:  oriented and alert    IV Access:  - None    Nursing Mobility/ADLs:  Walking   Assisted  Transfer  Assisted  Bathing  Assisted  Dressing  Assisted  Toileting  Assisted  Feeding  Independent  Med Admin  Independent  Med Delivery   whole    Wound Care Documentation and Therapy:  Wound 05/07/25 Thigh Left Open patch of skin bleeding (Active)   Wound Cleansed Other (Comment) 05/08/25 2100   Dressing/Treatment Open to air 05/09/25 2034   Number of days: 3        Elimination:  Continence:   Bowel: Yes  Bladder: Yes  Urinary Catheter: None   Colostomy/Ileostomy/Ileal Conduit: No       Date of Last BM: 5/10/2025    Intake/Output Summary (Last 24 hours) at 5/10/2025 1046  Last data filed at 5/10/2025 0538  Gross per 24 hour   Intake 351 ml   Output 400 ml   Net -49 ml     I/O last 3 completed shifts:  In: 351 [P.O.:300; IV Piggyback:51]  Out: 400 [Urine:400]    Safety Concerns:     At Risk for Falls    Impairments/Disabilities:      None    Nutrition Therapy:  Current Nutrition Therapy:   - Oral Diet:  General    Routes of Feeding: Oral  Liquids: Thin Liquids  Daily Fluid Restriction: no  Last Modified Barium Swallow with Video (Video Swallowing Test): not done    Treatments at the Time of Hospital Discharge:   Respiratory Treatments: n/a  Oxygen Therapy:  is not on home oxygen therapy.  Ventilator:    - No ventilator support    Rehab Therapies: Physical Therapy  Weight Bearing Status/Restrictions: No weight bearing restrictions  Other Medical Equipment (for information only, NOT a DME order):  wheelchair and walker  Other Treatments:     Patient's personal belongings (please select all that are sent with patient):  Wheelchair, clothes    RN SIGNATURE:  Electronically signed by Katie Cadena RN on 5/10/25 at 10:49 AM EDT

## 2025-05-11 LAB
EKG ATRIAL RATE: 100 BPM
EKG DIAGNOSIS: NORMAL
EKG Q-T INTERVAL: 360 MS
EKG QRS DURATION: 82 MS
EKG QTC CALCULATION (BAZETT): 430 MS
EKG R AXIS: -23 DEGREES
EKG T AXIS: 34 DEGREES
EKG VENTRICULAR RATE: 86 BPM

## (undated) DEVICE — ENDOSCOPIC KIT 1.1+ DE BOWL

## (undated) DEVICE — BITE BLOCK ENDOSCP AD 60 FR W/ ADJ STRP PLAS GRN BLOX

## (undated) DEVICE — LINE SAMP LNG AD ORAL NSL PT O2 TBNG SMRT CAPNOLN H +

## (undated) DEVICE — CANNULA NASAL ADULT 10FT ETCO2/CO2 VENTFLO